# Patient Record
Sex: FEMALE | NOT HISPANIC OR LATINO
[De-identification: names, ages, dates, MRNs, and addresses within clinical notes are randomized per-mention and may not be internally consistent; named-entity substitution may affect disease eponyms.]

---

## 2018-02-12 PROBLEM — Z00.00 ENCOUNTER FOR PREVENTIVE HEALTH EXAMINATION: Status: ACTIVE | Noted: 2018-02-12

## 2018-02-13 ENCOUNTER — APPOINTMENT (OUTPATIENT)
Dept: MAMMOGRAPHY | Facility: CLINIC | Age: 53
End: 2018-02-13
Payer: COMMERCIAL

## 2018-02-13 ENCOUNTER — OUTPATIENT (OUTPATIENT)
Dept: OUTPATIENT SERVICES | Facility: HOSPITAL | Age: 53
LOS: 1 days | End: 2018-02-13
Payer: COMMERCIAL

## 2018-02-13 DIAGNOSIS — Z00.00 ENCOUNTER FOR GENERAL ADULT MEDICAL EXAMINATION WITHOUT ABNORMAL FINDINGS: ICD-10-CM

## 2018-02-13 PROCEDURE — 77063 BREAST TOMOSYNTHESIS BI: CPT

## 2018-02-13 PROCEDURE — 77067 SCR MAMMO BI INCL CAD: CPT

## 2018-02-13 PROCEDURE — 77067 SCR MAMMO BI INCL CAD: CPT | Mod: 26

## 2018-02-13 PROCEDURE — 77063 BREAST TOMOSYNTHESIS BI: CPT | Mod: 26

## 2018-03-08 ENCOUNTER — APPOINTMENT (OUTPATIENT)
Dept: ULTRASOUND IMAGING | Facility: CLINIC | Age: 53
End: 2018-03-08
Payer: COMMERCIAL

## 2018-03-08 ENCOUNTER — OUTPATIENT (OUTPATIENT)
Dept: OUTPATIENT SERVICES | Facility: HOSPITAL | Age: 53
LOS: 1 days | End: 2018-03-08
Payer: COMMERCIAL

## 2018-03-08 DIAGNOSIS — R92.8 OTHER ABNORMAL AND INCONCLUSIVE FINDINGS ON DIAGNOSTIC IMAGING OF BREAST: ICD-10-CM

## 2018-03-08 PROCEDURE — 76641 ULTRASOUND BREAST COMPLETE: CPT

## 2018-03-08 PROCEDURE — 76641 ULTRASOUND BREAST COMPLETE: CPT | Mod: 26,LT

## 2018-03-08 PROCEDURE — 76642 ULTRASOUND BREAST LIMITED: CPT | Mod: 26,RT

## 2018-03-08 PROCEDURE — 76642 ULTRASOUND BREAST LIMITED: CPT

## 2021-02-24 ENCOUNTER — OUTPATIENT (OUTPATIENT)
Dept: OUTPATIENT SERVICES | Facility: HOSPITAL | Age: 56
LOS: 1 days | End: 2021-02-24
Payer: COMMERCIAL

## 2021-02-24 ENCOUNTER — APPOINTMENT (OUTPATIENT)
Dept: MAMMOGRAPHY | Facility: CLINIC | Age: 56
End: 2021-02-24
Payer: COMMERCIAL

## 2021-02-24 DIAGNOSIS — Z12.39 ENCOUNTER FOR OTHER SCREENING FOR MALIGNANT NEOPLASM OF BREAST: ICD-10-CM

## 2021-02-24 PROCEDURE — 77067 SCR MAMMO BI INCL CAD: CPT

## 2021-02-24 PROCEDURE — 77063 BREAST TOMOSYNTHESIS BI: CPT | Mod: 26

## 2021-02-24 PROCEDURE — 77063 BREAST TOMOSYNTHESIS BI: CPT

## 2021-02-24 PROCEDURE — 77067 SCR MAMMO BI INCL CAD: CPT | Mod: 26

## 2024-05-02 ENCOUNTER — OFFICE (OUTPATIENT)
Dept: URBAN - METROPOLITAN AREA CLINIC 115 | Facility: CLINIC | Age: 59
Setting detail: OPHTHALMOLOGY
End: 2024-05-02
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
## 2024-05-02 PROCEDURE — NO SHOW FE NO SHOW FEE: Performed by: OPTOMETRIST

## 2024-05-03 ENCOUNTER — OFFICE (OUTPATIENT)
Dept: URBAN - METROPOLITAN AREA CLINIC 115 | Facility: CLINIC | Age: 59
Setting detail: OPHTHALMOLOGY
End: 2024-05-03

## 2024-05-03 DIAGNOSIS — H25.13: ICD-10-CM

## 2024-05-03 DIAGNOSIS — H53.433: ICD-10-CM

## 2024-05-03 DIAGNOSIS — H46.2: ICD-10-CM

## 2024-05-03 PROCEDURE — 92004 COMPRE OPH EXAM NEW PT 1/>: CPT | Performed by: OPHTHALMOLOGY

## 2024-05-03 PROCEDURE — 92133 CPTRZD OPH DX IMG PST SGM ON: CPT | Performed by: OPHTHALMOLOGY

## 2024-05-03 PROCEDURE — 92250 FUNDUS PHOTOGRAPHY W/I&R: CPT | Performed by: OPHTHALMOLOGY

## 2024-05-03 ASSESSMENT — CONFRONTATIONAL VISUAL FIELD TEST (CVF)
OD_FINDINGS: FULL
OS_FINDINGS: FULL

## 2024-10-02 ENCOUNTER — OFFICE (OUTPATIENT)
Dept: URBAN - METROPOLITAN AREA CLINIC 115 | Facility: CLINIC | Age: 59
Setting detail: OPHTHALMOLOGY
End: 2024-10-02
Payer: COMMERCIAL

## 2024-10-02 DIAGNOSIS — H25.13: ICD-10-CM

## 2024-10-02 DIAGNOSIS — H53.433: ICD-10-CM

## 2024-10-02 DIAGNOSIS — H46.2: ICD-10-CM

## 2024-10-02 DIAGNOSIS — D35.2: ICD-10-CM

## 2024-10-02 PROCEDURE — 92133 CPTRZD OPH DX IMG PST SGM ON: CPT | Performed by: OPHTHALMOLOGY

## 2024-10-02 PROCEDURE — 92014 COMPRE OPH EXAM EST PT 1/>: CPT | Performed by: OPHTHALMOLOGY

## 2024-10-02 PROCEDURE — 92083 EXTENDED VISUAL FIELD XM: CPT | Performed by: OPHTHALMOLOGY

## 2024-10-02 ASSESSMENT — CONFRONTATIONAL VISUAL FIELD TEST (CVF)
OS_FINDINGS: FULL
OD_FINDINGS: FULL

## 2024-10-02 ASSESSMENT — TONOMETRY
OD_IOP_MMHG: 17
OS_IOP_MMHG: 18

## 2024-10-16 ASSESSMENT — REFRACTION_AUTOREFRACTION
OS_SPHERE: +2.75
OD_CYLINDER: -1.50
OD_SPHERE: +2.50
OS_AXIS: 080
OS_CYLINDER: -1.75
OD_AXIS: 093

## 2024-10-16 ASSESSMENT — REFRACTION_CURRENTRX
OD_AXIS: 85
OS_VPRISM_DIRECTION: SV
OD_SPHERE: -+2.50
OD_OVR_VA: 20/
OS_OVR_VA: 20/
OS_SPHERE: +2.50
OD_VPRISM_DIRECTION: SV
OS_AXIS: 73
OS_CYLINDER: -0.25
OD_CYLINDER: -0.25

## 2024-10-16 ASSESSMENT — VISUAL ACUITY
OS_BCVA: 20/25-
OD_BCVA: 20/150

## 2024-10-18 ENCOUNTER — INPATIENT (INPATIENT)
Facility: HOSPITAL | Age: 59
LOS: 9 days | Discharge: HOME | DRG: 645 | End: 2024-10-28
Attending: NEUROLOGICAL SURGERY | Admitting: NEUROLOGICAL SURGERY
Payer: COMMERCIAL

## 2024-10-18 VITALS
RESPIRATION RATE: 18 BRPM | HEART RATE: 97 BPM | OXYGEN SATURATION: 97 % | WEIGHT: 149.91 LBS | SYSTOLIC BLOOD PRESSURE: 127 MMHG | TEMPERATURE: 99 F | DIASTOLIC BLOOD PRESSURE: 87 MMHG | HEIGHT: 65 IN

## 2024-10-18 DIAGNOSIS — D49.7 NEOPLASM OF UNSPECIFIED BEHAVIOR OF ENDOCRINE GLANDS AND OTHER PARTS OF NERVOUS SYSTEM: ICD-10-CM

## 2024-10-18 DIAGNOSIS — Z87.898 PERSONAL HISTORY OF OTHER SPECIFIED CONDITIONS: Chronic | ICD-10-CM

## 2024-10-18 DIAGNOSIS — Z98.891 HISTORY OF UTERINE SCAR FROM PREVIOUS SURGERY: Chronic | ICD-10-CM

## 2024-10-18 LAB
ALBUMIN SERPL ELPH-MCNC: 4.4 G/DL — SIGNIFICANT CHANGE UP (ref 3.3–5.2)
ALP SERPL-CCNC: 94 U/L — SIGNIFICANT CHANGE UP (ref 40–120)
ALT FLD-CCNC: 24 U/L — SIGNIFICANT CHANGE UP
ANION GAP SERPL CALC-SCNC: 12 MMOL/L — SIGNIFICANT CHANGE UP (ref 5–17)
APTT BLD: 29.9 SEC — SIGNIFICANT CHANGE UP (ref 24.5–35.6)
AST SERPL-CCNC: 29 U/L — SIGNIFICANT CHANGE UP
BASOPHILS # BLD AUTO: 0.05 K/UL — SIGNIFICANT CHANGE UP (ref 0–0.2)
BASOPHILS NFR BLD AUTO: 0.5 % — SIGNIFICANT CHANGE UP (ref 0–2)
BILIRUB SERPL-MCNC: 0.2 MG/DL — LOW (ref 0.4–2)
BLD GP AB SCN SERPL QL: SIGNIFICANT CHANGE UP
BUN SERPL-MCNC: 11.2 MG/DL — SIGNIFICANT CHANGE UP (ref 8–20)
CALCIUM SERPL-MCNC: 10.2 MG/DL — SIGNIFICANT CHANGE UP (ref 8.4–10.5)
CHLORIDE SERPL-SCNC: 103 MMOL/L — SIGNIFICANT CHANGE UP (ref 96–108)
CO2 SERPL-SCNC: 23 MMOL/L — SIGNIFICANT CHANGE UP (ref 22–29)
CREAT SERPL-MCNC: 0.88 MG/DL — SIGNIFICANT CHANGE UP (ref 0.5–1.3)
EGFR: 75 ML/MIN/1.73M2 — SIGNIFICANT CHANGE UP
EOSINOPHIL # BLD AUTO: 0.07 K/UL — SIGNIFICANT CHANGE UP (ref 0–0.5)
EOSINOPHIL NFR BLD AUTO: 0.6 % — SIGNIFICANT CHANGE UP (ref 0–6)
GLUCOSE SERPL-MCNC: 69 MG/DL — LOW (ref 70–99)
HCT VFR BLD CALC: 45.5 % — HIGH (ref 34.5–45)
HGB BLD-MCNC: 15.6 G/DL — HIGH (ref 11.5–15.5)
IMM GRANULOCYTES NFR BLD AUTO: 0.2 % — SIGNIFICANT CHANGE UP (ref 0–0.9)
INR BLD: 1.08 RATIO — SIGNIFICANT CHANGE UP (ref 0.85–1.16)
LYMPHOCYTES # BLD AUTO: 3.28 K/UL — SIGNIFICANT CHANGE UP (ref 1–3.3)
LYMPHOCYTES # BLD AUTO: 30.1 % — SIGNIFICANT CHANGE UP (ref 13–44)
MCHC RBC-ENTMCNC: 31 PG — SIGNIFICANT CHANGE UP (ref 27–34)
MCHC RBC-ENTMCNC: 34.3 GM/DL — SIGNIFICANT CHANGE UP (ref 32–36)
MCV RBC AUTO: 90.5 FL — SIGNIFICANT CHANGE UP (ref 80–100)
MONOCYTES # BLD AUTO: 0.87 K/UL — SIGNIFICANT CHANGE UP (ref 0–0.9)
MONOCYTES NFR BLD AUTO: 8 % — SIGNIFICANT CHANGE UP (ref 2–14)
NEUTROPHILS # BLD AUTO: 6.6 K/UL — SIGNIFICANT CHANGE UP (ref 1.8–7.4)
NEUTROPHILS NFR BLD AUTO: 60.6 % — SIGNIFICANT CHANGE UP (ref 43–77)
PLATELET # BLD AUTO: 284 K/UL — SIGNIFICANT CHANGE UP (ref 150–400)
POTASSIUM SERPL-MCNC: 4.3 MMOL/L — SIGNIFICANT CHANGE UP (ref 3.5–5.3)
POTASSIUM SERPL-SCNC: 4.3 MMOL/L — SIGNIFICANT CHANGE UP (ref 3.5–5.3)
PROT SERPL-MCNC: 8.3 G/DL — SIGNIFICANT CHANGE UP (ref 6.6–8.7)
PROTHROM AB SERPL-ACNC: 12.2 SEC — SIGNIFICANT CHANGE UP (ref 9.9–13.4)
RBC # BLD: 5.03 M/UL — SIGNIFICANT CHANGE UP (ref 3.8–5.2)
RBC # FLD: 13.4 % — SIGNIFICANT CHANGE UP (ref 10.3–14.5)
SODIUM SERPL-SCNC: 138 MMOL/L — SIGNIFICANT CHANGE UP (ref 135–145)
WBC # BLD: 10.89 K/UL — HIGH (ref 3.8–10.5)
WBC # FLD AUTO: 10.89 K/UL — HIGH (ref 3.8–10.5)

## 2024-10-18 PROCEDURE — 99222 1ST HOSP IP/OBS MODERATE 55: CPT

## 2024-10-18 PROCEDURE — 70553 MRI BRAIN STEM W/O & W/DYE: CPT | Mod: 26,76

## 2024-10-18 PROCEDURE — 99285 EMERGENCY DEPT VISIT HI MDM: CPT

## 2024-10-18 PROCEDURE — 71045 X-RAY EXAM CHEST 1 VIEW: CPT | Mod: 26

## 2024-10-18 RX ORDER — B-COMPLEX WITH VITAMIN C
1 VIAL (ML) INJECTION DAILY
Refills: 0 | Status: DISCONTINUED | OUTPATIENT
Start: 2024-10-18 | End: 2024-10-23

## 2024-10-18 RX ORDER — SODIUM CHLORIDE 9 MG/ML
1000 INJECTION, SOLUTION INTRAMUSCULAR; INTRAVENOUS; SUBCUTANEOUS
Refills: 0 | Status: DISCONTINUED | OUTPATIENT
Start: 2024-10-18 | End: 2024-10-23

## 2024-10-18 RX ORDER — ENOXAPARIN SODIUM 40MG/0.4ML
40 SYRINGE (ML) SUBCUTANEOUS EVERY 24 HOURS
Refills: 0 | Status: DISCONTINUED | OUTPATIENT
Start: 2024-10-19 | End: 2024-10-22

## 2024-10-18 RX ORDER — ONDANSETRON HYDROCHLORIDE 2 MG/ML
4 INJECTION, SOLUTION INTRAMUSCULAR; INTRAVENOUS EVERY 6 HOURS
Refills: 0 | Status: DISCONTINUED | OUTPATIENT
Start: 2024-10-18 | End: 2024-10-23

## 2024-10-18 RX ORDER — SENNA 187 MG
2 TABLET ORAL AT BEDTIME
Refills: 0 | Status: DISCONTINUED | OUTPATIENT
Start: 2024-10-18 | End: 2024-10-23

## 2024-10-18 RX ORDER — POLYETHYLENE GLYCOL 3350 17 G/17G
17 POWDER, FOR SOLUTION ORAL DAILY
Refills: 0 | Status: DISCONTINUED | OUTPATIENT
Start: 2024-10-18 | End: 2024-10-23

## 2024-10-18 RX ORDER — ACETAMINOPHEN 500 MG
650 TABLET ORAL EVERY 6 HOURS
Refills: 0 | Status: DISCONTINUED | OUTPATIENT
Start: 2024-10-18 | End: 2024-10-23

## 2024-10-18 NOTE — ED PROVIDER NOTE - PRINCIPAL DIAGNOSIS
Pituitary tumor Topical Retinoid counseling:  Patient advised to apply a pea-sized amount only at bedtime and wait 30 minutes after washing their face before applying.  If too drying, patient may add a non-comedogenic moisturizer. The patient verbalized understanding of the proper use and possible adverse effects of retinoids.  All of the patient's questions and concerns were addressed.

## 2024-10-18 NOTE — ED PROVIDER NOTE - PHYSICAL EXAMINATION
Gen: Alert, NAD  Head: NC, AT, PERRL, EOMI, normal lids/conjunctiva\  Neck: +supple, no tenderness/meningismus/JVD, +Trachea midline  Pulm: Bilateral BS, normal resp effort, no wheeze/stridor/retractions  CV: RRR, no M/R/G, 2+dist pulses  Abd: soft, NT/ND, +BS, no hepatosplenomegaly  Mskel: ROM intact x4 extremities.  no edema/erythema/cyanosis  Skin: no rash, warm, dry  Neuro: AAOx3, left eye visual field deficit.  otherwise intact

## 2024-10-18 NOTE — ED PROVIDER NOTE - OBJECTIVE STATEMENT
59 yoF; pituitary mass (s/p resection 2019 by Dr. Tovar at [facility]); now presenting with headache, blurry vision and left upper extremity tingling and numbness over the past 6 months.  Patient states progressively worsening blurry vision.  Patient recently had an MRI that showed recurrence of the tumor.  Patient now transferred here from [facility] for evaluation by neurosurgery.

## 2024-10-18 NOTE — ED PROVIDER NOTE - CLINICAL SUMMARY MEDICAL DECISION MAKING FREE TEXT BOX
59 yoF; pituitary mass (s/p resection 2019 by Dr. Tovar at [facility]); now presenting with headache, blurry vision and left upper extremity tingling and numbness over the past 6 months.  Patient states progressively worsening blurry vision.  Patient recently had an MRI that showed recurrence of the tumor.  Patient now transferred here from [facility] for evaluation by neurosurgery.  will admit to neuro stepdown

## 2024-10-18 NOTE — ED ADULT NURSE NOTE - OBJECTIVE STATEMENT
pt received in critical care. Transfer from [facility] for regrowth of "tumor in eye". No facial droop, symmetrical movements, NAD noted, respirations even and unlabored.  Safety precautions in place.  Plan of care explained, pt verbalized understanding.    Jose at bedside.

## 2024-10-18 NOTE — H&P ADULT - ASSESSMENT
60 yo with hx of TSP for pituitary resection by Dr. Guevara in 2019 presents for blurry vision. 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     Plan:  - admit to Dr. Guevara/Neuro surgery   - step down neuro checks q2  - MRI brain w&w/out  and sella with brain lab  - SDCs and lovenox for DVT ppx  - reg diet  - IVF   - pain control multimodal, prn, avoid over sedation  - bowel regimen senna miralax  - Endo consult  - endo labs   - Medicine to be consulted  - doppler, chest xray. EKG, TTE    - case and plan discussed with Dr. Guevara

## 2024-10-18 NOTE — ED ADULT NURSE NOTE - ED STAT RN HANDOFF DETAILS
Report given to Sam FRITZ RN in HR. Pt placed on telebox and transported by RN. NAD noted, respirations even and unlabored.

## 2024-10-18 NOTE — H&P ADULT - BIRTH SEX
Patient is calling the office to get a refill on in syringes through [employer]    1 CC, 8 mm, 31 gauge syringes.  90 day supply    Please call into [employer] at: [phone]       Female

## 2024-10-18 NOTE — ED ADULT TRIAGE NOTE - CHIEF COMPLAINT QUOTE
pt BIBA from [facility] with hx of pituitary tumor 5 years ago, was resected and now the past few days has had changes in vision, CT @ [facility] showed recurrence of pituitary mass. pt a&ox4, c/o of blurry vision. pt BIBA from [facility] with hx of pituitary tumor 5 years ago, was resected and now the past few days has had changes in vision, HA and L upper arm numbness and tingling. CT @ [facility] showed recurrence of pituitary mass. pt a&ox4, c/o of blurry vision.

## 2024-10-18 NOTE — ED ADULT NURSE NOTE - CHIEF COMPLAINT QUOTE
pt BIBA from [facility] with hx of pituitary tumor 5 years ago, was resected and now the past few days has had changes in vision, CT @ [facility] showed recurrence of pituitary mass. pt a&ox4, c/o of blurry vision.

## 2024-10-18 NOTE — H&P ADULT - NSHPPHYSICALEXAM_GEN_ALL_CORE
CONSTITUTIONAL: Well groomed, no apparent distress   EYES: PERRLA and symmetric, EOMI, No conjunctival or scleral injection, non-icteric    reports blurry vision in Left eye. Questionable Left superior temporal vision loss.   RESP: No respiratory distress   NEURO:   - Mental Status:  Alert, awake, oriented to person, place, and time; Speech is fluent. Language is normal. Follows commands well.  Insight and knowledge appear appropriate.    - Motor:  Strength is 5/5 in b/l UEs and LEs.  There is no pronator drift.    - Sensory:  Symmetric to light touch    PSYCH: Appropriate insight/judgment; A+O x 3, mood and affect appropriate

## 2024-10-18 NOTE — H&P ADULT - HISTORY OF PRESENT ILLNESS
60 yo F with hx of TSP for pituitary resection by Dr. Guevara in 2019 presents for blurry vision. Pt has been having blurry vision for a few weeks. [facility] went to [facility] for eye exam due to blurry vision. They sent her for MRI which was preformed at a [facility] which showed increased size of pituitary macroadenoma with mass effect on optic chiasm. Pt did not have Dr. Guevara's info so presented to [facility]. Pt transferred to [facility].   Pt denies HA, SOB, palpitations.

## 2024-10-18 NOTE — ED PROVIDER NOTE - NS ED ROS FT
Constitutional: (-) fever  (-)chills  (-)sweats  Eyes/ENT: +blurry vision  Cardiovascular: (-) chest pain, (-) palpitations (-) edema   Respiratory: (-) cough, (-) shortness of breath   Gastrointestinal: (-)nausea  (-)vomiting, (-) diarrhea  (-) abdominal pain   :  (-)dysuria, (-)frequency, (-)urgency, (-)hematuria  Musculoskeletal: (-) neck pain, (-) back pain, (-) joint pain  Integumentary: (-) rash, (-) edema  Neurological: (+) headache, (-) altered mental status  (-)LOC +weakness

## 2024-10-19 ENCOUNTER — RESULT REVIEW (OUTPATIENT)
Age: 59
End: 2024-10-19

## 2024-10-19 LAB
A1C WITH ESTIMATED AVERAGE GLUCOSE RESULT: 5.7 % — HIGH (ref 4–5.6)
ACTH SER-ACNC: 13.9 PG/ML — SIGNIFICANT CHANGE UP (ref 7.2–63.3)
ALBUMIN SERPL ELPH-MCNC: 3.8 G/DL — SIGNIFICANT CHANGE UP (ref 3.3–5.2)
ALP SERPL-CCNC: 83 U/L — SIGNIFICANT CHANGE UP (ref 40–120)
ALT FLD-CCNC: 20 U/L — SIGNIFICANT CHANGE UP
ANION GAP SERPL CALC-SCNC: 14 MMOL/L — SIGNIFICANT CHANGE UP (ref 5–17)
AST SERPL-CCNC: 23 U/L — SIGNIFICANT CHANGE UP
BILIRUB SERPL-MCNC: 0.3 MG/DL — LOW (ref 0.4–2)
BUN SERPL-MCNC: 11.6 MG/DL — SIGNIFICANT CHANGE UP (ref 8–20)
CALCIUM SERPL-MCNC: 9.1 MG/DL — SIGNIFICANT CHANGE UP (ref 8.4–10.5)
CHLORIDE SERPL-SCNC: 104 MMOL/L — SIGNIFICANT CHANGE UP (ref 96–108)
CO2 SERPL-SCNC: 23 MMOL/L — SIGNIFICANT CHANGE UP (ref 22–29)
CORTIS AM PEAK SERPL-MCNC: 15.6 UG/DL — SIGNIFICANT CHANGE UP (ref 6–18.4)
CREAT SERPL-MCNC: 0.76 MG/DL — SIGNIFICANT CHANGE UP (ref 0.5–1.3)
EGFR: 89 ML/MIN/1.73M2 — SIGNIFICANT CHANGE UP
ESTIMATED AVERAGE GLUCOSE: 117 MG/DL — HIGH (ref 68–114)
FSH SERPL-MCNC: 5.7 IU/L — SIGNIFICANT CHANGE UP
GH SERPL-MCNC: 1.18 NG/ML — SIGNIFICANT CHANGE UP (ref 0.12–9.88)
GLUCOSE BLDC GLUCOMTR-MCNC: 124 MG/DL — HIGH (ref 70–99)
GLUCOSE BLDC GLUCOMTR-MCNC: 141 MG/DL — HIGH (ref 70–99)
GLUCOSE SERPL-MCNC: 81 MG/DL — SIGNIFICANT CHANGE UP (ref 70–99)
HCT VFR BLD CALC: 42.1 % — SIGNIFICANT CHANGE UP (ref 34.5–45)
HGB BLD-MCNC: 14 G/DL — SIGNIFICANT CHANGE UP (ref 11.5–15.5)
LH SERPL-ACNC: 0.4 IU/L — SIGNIFICANT CHANGE UP
MAGNESIUM SERPL-MCNC: 2.1 MG/DL — SIGNIFICANT CHANGE UP (ref 1.6–2.6)
MCHC RBC-ENTMCNC: 30.2 PG — SIGNIFICANT CHANGE UP (ref 27–34)
MCHC RBC-ENTMCNC: 33.3 GM/DL — SIGNIFICANT CHANGE UP (ref 32–36)
MCV RBC AUTO: 90.7 FL — SIGNIFICANT CHANGE UP (ref 80–100)
PHOSPHATE SERPL-MCNC: 3.9 MG/DL — SIGNIFICANT CHANGE UP (ref 2.4–4.7)
PLATELET # BLD AUTO: 258 K/UL — SIGNIFICANT CHANGE UP (ref 150–400)
POTASSIUM SERPL-MCNC: 4.1 MMOL/L — SIGNIFICANT CHANGE UP (ref 3.5–5.3)
POTASSIUM SERPL-SCNC: 4.1 MMOL/L — SIGNIFICANT CHANGE UP (ref 3.5–5.3)
PROLACTIN SERPL-MCNC: 91 NG/ML — HIGH (ref 3.4–24.1)
PROT SERPL-MCNC: 7 G/DL — SIGNIFICANT CHANGE UP (ref 6.6–8.7)
RBC # BLD: 4.64 M/UL — SIGNIFICANT CHANGE UP (ref 3.8–5.2)
RBC # FLD: 13.4 % — SIGNIFICANT CHANGE UP (ref 10.3–14.5)
SODIUM SERPL-SCNC: 141 MMOL/L — SIGNIFICANT CHANGE UP (ref 135–145)
T3 SERPL-MCNC: 98 NG/DL — SIGNIFICANT CHANGE UP (ref 80–200)
T4 AB SER-ACNC: 6.9 UG/DL — SIGNIFICANT CHANGE UP (ref 4.5–12)
TSH SERPL-MCNC: 2.19 UIU/ML — SIGNIFICANT CHANGE UP (ref 0.27–4.2)
WBC # BLD: 10.23 K/UL — SIGNIFICANT CHANGE UP (ref 3.8–10.5)
WBC # FLD AUTO: 10.23 K/UL — SIGNIFICANT CHANGE UP (ref 3.8–10.5)

## 2024-10-19 PROCEDURE — 93306 TTE W/DOPPLER COMPLETE: CPT | Mod: 26

## 2024-10-19 PROCEDURE — 99231 SBSQ HOSP IP/OBS SF/LOW 25: CPT

## 2024-10-19 PROCEDURE — 99223 1ST HOSP IP/OBS HIGH 75: CPT

## 2024-10-19 PROCEDURE — 93010 ELECTROCARDIOGRAM REPORT: CPT

## 2024-10-19 RX ORDER — DEXAMETHASONE 1.5 MG 1.5 MG/1
4 TABLET ORAL EVERY 6 HOURS
Refills: 0 | Status: DISCONTINUED | OUTPATIENT
Start: 2024-10-19 | End: 2024-10-23

## 2024-10-19 RX ORDER — PANTOPRAZOLE SODIUM 40 MG/1
40 TABLET, DELAYED RELEASE ORAL DAILY
Refills: 0 | Status: DISCONTINUED | OUTPATIENT
Start: 2024-10-19 | End: 2024-10-23

## 2024-10-19 RX ORDER — INSULIN LISPRO 100/ML
VIAL (ML) SUBCUTANEOUS
Refills: 0 | Status: DISCONTINUED | OUTPATIENT
Start: 2024-10-19 | End: 2024-10-23

## 2024-10-19 RX ORDER — GLUCAGON INJECTION, SOLUTION 1 MG/.2ML
1 INJECTION, SOLUTION SUBCUTANEOUS ONCE
Refills: 0 | Status: DISCONTINUED | OUTPATIENT
Start: 2024-10-19 | End: 2024-10-23

## 2024-10-19 RX ORDER — INSULIN LISPRO 100/ML
VIAL (ML) SUBCUTANEOUS AT BEDTIME
Refills: 0 | Status: DISCONTINUED | OUTPATIENT
Start: 2024-10-19 | End: 2024-10-23

## 2024-10-19 RX ADMIN — POLYETHYLENE GLYCOL 3350 17 GRAM(S): 17 POWDER, FOR SOLUTION ORAL at 11:55

## 2024-10-19 RX ADMIN — Medication 2 TABLET(S): at 21:26

## 2024-10-19 RX ADMIN — DEXAMETHASONE 1.5 MG 4 MILLIGRAM(S): 1.5 TABLET ORAL at 17:07

## 2024-10-19 RX ADMIN — Medication 40 MILLIGRAM(S): at 11:54

## 2024-10-19 RX ADMIN — Medication 1 TABLET(S): at 11:54

## 2024-10-19 RX ADMIN — SODIUM CHLORIDE 60 MILLILITER(S): 9 INJECTION, SOLUTION INTRAMUSCULAR; INTRAVENOUS; SUBCUTANEOUS at 11:56

## 2024-10-19 NOTE — CONSULT NOTE ADULT - SUBJECTIVE AND OBJECTIVE BOX
60 yo F with hx of TSP for pituitary resection by Dr. Guevara in 2019 presents for blurry vision, she has been having blurry vision for a few weeks. Pt went to [facility] for eye exam due to blurry vision. They sent her for MRI which was preformed at a [facility] which showed increased size of pituitary macroadenoma with mass effect on optic chiasm., came in for admitted under Neurosurgery for further Management, imaging done here showed  Large mass in the sella and suprasellar cistern, with chiasmatic encroachment, over the course she has been complaining of intermittent chest pain, she has this chest pain for 3 weeks, no chest tenderness, no relation with exertion, EKG with no acute changes, trop 1st set done came negative too, TTE pending, medicine consulted for Medical Management.        Allergies:   	No Known Allergies:     Home Medications:   * Outpatient Medication Status not yet specified      PAST MEDICAL HISTORY:  History of pituitary surgery.     PAST SURGICAL HISTORY:  History of pituitary tumor     S/P  section.     Social History:  Not a smoker, drinker or using any drugs     Vital Signs Last 24 Hrs  T(C): 36.7 (19 Oct 2024 15:00), Max: 37.2 (18 Oct 2024 17:19)  T(F): 98 (19 Oct 2024 15:00), Max: 98.9 (18 Oct 2024 17:19)  HR: 76 (19 Oct 2024 15:00) (51 - 97)  BP: 126/81 (19 Oct 2024 15:00) (107/72 - 138/68)  BP(mean): 10 (18 Oct 2024 20:14) (10 - 10)  RR: 16 (19 Oct 2024 15:00) (16 - 18)  SpO2: 98% (19 Oct 2024 15:00) (97% - 100%)    Parameters below as of 19 Oct 2024 15:00  Patient On (Oxygen Delivery Method): room air        PHYSICAL EXAM:    GENERAL: Middle age female looking comfortable  NECK: soft, Supple, No JVD  CHEST/LUNG: Clear to auscultate bilaterally; No wheezing  HEART: S1S2+, Regular rate and rhythm; No murmurs  ABDOMEN: Soft, Nontender, Nondistended; Bowel sounds present  EXTREMITIES:  1+ Peripheral Pulses, No edema  SKIN: No rashes or lesions  NEURO: AAOX3  PSYCH: normal mood      LABS:                        14.0   10.23 )-----------( 258      ( 19 Oct 2024 04:15 )             42.1     10    141  |  104  |  11.6  ----------------------------<  81  4.1   |  23.0  |  0.76    Ca    9.1      19 Oct 2024 04:15  Phos  3.9     10-19  Mg     2.1     10-19    TPro  7.0  /  Alb  3.8  /  TBili  0.3[L]  /  DBili  x   /  AST  23  /  ALT  20  /  AlkPhos  83  10    PT/INR - ( 18 Oct 2024 17:47 )   PT: 12.2 sec;   INR: 1.08 ratio         PTT - ( 18 Oct 2024 17:47 )  PTT:29.9 sec      I&O's Summary    18 Oct 2024 07:01  -  19 Oct 2024 07:00  --------------------------------------------------------  IN: 480 mL / OUT: 0 mL / NET: 480 mL        MEDICATIONS  (STANDING):  dexAMETHasone  Injectable 4 milliGRAM(s) IV Push every 6 hours  dextrose 5%. 1000 milliLiter(s) (100 mL/Hr) IV Continuous <Continuous>  dextrose 5%. 1000 milliLiter(s) (50 mL/Hr) IV Continuous <Continuous>  dextrose 50% Injectable 25 Gram(s) IV Push once  dextrose 50% Injectable 12.5 Gram(s) IV Push once  dextrose 50% Injectable 25 Gram(s) IV Push once  enoxaparin Injectable 40 milliGRAM(s) SubCutaneous every 24 hours  glucagon  Injectable 1 milliGRAM(s) IntraMuscular once  insulin lispro (ADMELOG) corrective regimen sliding scale   SubCutaneous three times a day before meals  insulin lispro (ADMELOG) corrective regimen sliding scale   SubCutaneous at bedtime  multivitamin 1 Tablet(s) Oral daily  pantoprazole  Injectable 40 milliGRAM(s) IV Push daily  polyethylene glycol 3350 17 Gram(s) Oral daily  senna 2 Tablet(s) Oral at bedtime  sodium chloride 0.9%. 1000 milliLiter(s) (60 mL/Hr) IV Continuous <Continuous>    MEDICATIONS  (PRN):  acetaminophen     Tablet .. 650 milliGRAM(s) Oral every 6 hours PRN Mild Pain (1 - 3)  dextrose Oral Gel 15 Gram(s) Oral once PRN Blood Glucose LESS THAN 70 milliGRAM(s)/deciliter  ondansetron Injectable 4 milliGRAM(s) IV Push every 6 hours PRN Nausea and/or Vomiting

## 2024-10-19 NOTE — CONSULT NOTE ADULT - ASSESSMENT
58 yo F with hx of TSP for pituitary resection by Dr. Guevara in 2019 presents for blurry vision, she has been having blurry vision for a few weeks. Pt went to [facility] for eye exam due to blurry vision. They sent her for MRI which was preformed at a [facility] which showed increased size of pituitary macroadenoma with mass effect on optic chiasm., came in for admitted under Neurosurgery for further Management, imaging done here showed  Large mass in the sella and suprasellar cistern, with chiasmatic encroachment, over the course she has been complaining of intermittent chest pain, she has this chest pain for 3 weeks, no chest tenderness, no relation with exertion, EKG with no acute changes, trop 1st set done came negative too, TTE pending, medicine consulted for Medical Management.     Blurry Vission/  Large mass in the sella and suprasellar cistern, with chiasmatic encroachment:   Management as per Neurosurgery   Neuro checks  Plan for surgery during this admission  Hormone levels to monitor   Endocrine consult.   monitor prolactin level as it is elevated.       Intermittent chest pain:   EKG with no acute changes  trops negative  TTE pending   would recommend cardiology consult as patient might require stress test before procedure.  will continue to monitor    Prediabetes: Counselled for low carb diet.     Plan of care discussed with Neurosurgery team.  60 yo F with hx of TSP for pituitary resection by Dr. Guevara in 2019 presents for blurry vision, she has been having blurry vision for a few weeks. Pt went to [facility] for eye exam due to blurry vision. They sent her for MRI which was preformed at a [facility] which showed increased size of pituitary macroadenoma with mass effect on optic chiasm., came in for admitted under Neurosurgery for further Management, imaging done here showed  Large mass in the sella and suprasellar cistern, with chiasmatic encroachment, over the course she has been complaining of intermittent chest pain, she has this chest pain for 3 weeks, no chest tenderness, no relation with exertion, EKG with no acute changes, trop 1st set done came negative too, TTE pending, medicine consulted for Medical Management.     Blurry Vission/  Large mass in the sella and suprasellar cistern, with chiasmatic encroachment:   Management as per Neurosurgery   Neuro checks  Plan for surgery during this admission  Hormone levels to monitor   Endocrine consult.   monitor prolactin level as it is elevated.       Intermittent chest pain:   EKG with no acute changes  trops negative  TTE pending   would recommend cardiology consult as patient might require stress test before procedure.  will continue to monitor  obtain lipid panel in am   Trop in am  PRN pain meds   could not give aspirin given upcoming surgery    Prediabetes: Counselled for low carb diet.     Plan of care discussed with Neurosurgery team.  60 yo F with hx of TSP for pituitary resection by Dr. Guevara in 2019 presents for blurry vision, she has been having blurry vision for a few weeks. Pt went to [facility] for eye exam due to blurry vision. They sent her for MRI which was preformed at a [facility] which showed increased size of pituitary macroadenoma with mass effect on optic chiasm., came in for admitted under Neurosurgery for further Management, imaging done here showed  Large mass in the sella and suprasellar cistern, with chiasmatic encroachment, over the course she has been complaining of intermittent chest pain, she has this chest pain for 3 weeks, no chest tenderness, no relation with exertion, EKG with no acute changes, trop 1st set done came negative too, TTE pending, medicine consulted for Medical Management.     Plan:     Blurry Vission/  Large mass in the sella and suprasellar cistern, with chiasmatic encroachment:   Management as per Neurosurgery   Neuro checks  Plan for surgery during this admission  Hormone levels to monitor   Endocrine consult.   monitor prolactin level as it is elevated.       Intermittent chest pain:   EKG with no acute changes  trops negative  TTE pending   would recommend cardiology consult as patient might require stress test before procedure.  will continue to monitor  obtain lipid panel in am   Trop in am  PRN pain meds   could not give aspirin given upcoming surgery    Prediabetes: Counselled for low carb diet.     Plan of care discussed with Neurosurgery team.

## 2024-10-19 NOTE — PROGRESS NOTE ADULT - SUBJECTIVE AND OBJECTIVE BOX
NEUROSUGERY PA PROGRESS NOTE  HPI:  58 yo F with hx of TSP for pituitary resection by Dr. Guevara in 2019 presents for blurry vision. Pt has been having blurry vision for a few weeks. Pt went to [facility] for eye exam due to blurry vision. 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 Pt did not have Dr. Guevara's info so presented to [facility]. Pt transferred to [facility].   Pt denies HA, SOB, palpitations.  (18 Oct 2024 19:16)    INTERVAL HPI/OVERNIGHT EVENTS:  59y Female s/p TSP for tumor resection in 2019 presented yesterday with blurry vision and out pt MRI with recurrent pituitary mass.          Vital Signs Last 24 Hrs  T(C): 36.4 (19 Oct 2024 07:09), Max: 37.2 (18 Oct 2024 17:19)  T(F): 97.5 (19 Oct 2024 07:09), Max: 98.9 (18 Oct 2024 17:19)  HR: 65 (19 Oct 2024 07:09) (51 - 97)  BP: 111/69 (19 Oct 2024 07:09) (107/72 - 138/68)  BP(mean): 10 (18 Oct 2024 20:14) (10 - 10)  RR: 18 (19 Oct 2024 07:09) (16 - 18)  SpO2: 97% (19 Oct 2024 07:09) (97% - 99%)  Parameters below as of 19 Oct 2024 07:09  Patient On (Oxygen Delivery Method): room air    PHYSICAL EXAM:      LABS:                        14.0   10.23 )-----------( 258      ( 19 Oct 2024 04:15 )             42.1     10-    141  |  104  |  11.6  ----------------------------<  81  4.1   |  23.0  |  0.76    Ca    9.1      19 Oct 2024 04:15  Phos  3.9     10-  Mg     2.1     10-    TPro  7.0  /  Alb  3.8  /  TBili  0.3[L]  /  DBili  x   /  AST  23  /  ALT  20  /  AlkPhos  83  10-  PT/INR - ( 18 Oct 2024 17:47 )   PT: 12.2 sec;   INR: 1.08 ratio    PTT - ( 18 Oct 2024 17:47 )  PTT:29.9 sec  Urinalysis Basic - ( 19 Oct 2024 04:15 )  Color: x / Appearance: x / SG: x / pH: x  Gluc: 81 mg/dL / Ketone: x  / Bili: x / Urobili: x   Blood: x / Protein: x / Nitrite: x   Leuk Esterase: x / RBC: x / WBC x   Sq Epi: x / Non Sq Epi: x / Bacteria: x  10-18 @ 07:01  -  10-19 @ 07:00  --------------------------------------------------------  IN: 480 mL / OUT: 0 mL / NET: 480 mL    RADIOLOGY & ADDITIONAL TESTS:  < from: MR Brain Stereotactic w/wo IV Cont (10.18.24 @ 23:33) >  INTERPRETATION:  EXAMINATION: MR BRAIN FOR SRS WITHOUT ANDWITH IV   CONTRAST, MR SELLA WITHOUT AND WITH IV CONTRAST  CLINICAL INDICATION: hx of pituitary adenoma w/ increase in growth  TECHNIQUE: Multiplanar MRI images of the brain and the sella were   obtained before and after IV injection of gadolinium,7 cc administered.  COMPARISON: None available.  FINDINGS:  There is a large mass in the sella and suprasellar cistern measuring   approximately 2.2 x 3.0 x 3.0 cm. There is associated chiasmatic   compression. There is invasion of the left cavernous sinus, with tumor   encasing the C4 segment of the left ICA. There is associated mass effect   on the frontal lobes, which are displaced anteriorly and lateral.  The appearance is consistent with the stated clinical diagnosis of a   pituitary adenoma. Comparison with prior outside examinations recommended   to assess for interval growth. Neurosurgical consultation is recommended.  The suprasellar cistern, optic chiasm, carotids, and cavernous sinuses   are otherwise negative. The hypothalamus and clivus are within normal   limits.  Cerebral volume is within normal limits. No premature white matter   disease.  No acute intracranial hemorrhage. No midline shift or herniation. No   acute ischemia. Intracranial flow voids are patent. The cerebellar   tonsils are normally positioned. The dural venous sinuses are patent,   although this examination was not optimized to evaluate the vasculature.  Limited views of the sinuses and mastoids show mild mucosal thickening   without air-fluid levels, likely chronic.  Limited views of the orbits and visualized soft tissues of the neck,   face, scalp, skull base, and calvarium are otherwise unremarkable.  IMPRESSION:  1.  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  Patient Name: FERNANDO DOMINGUEZ  MRN: [id number], : 61  --- End of Report ---  < end of copied text >   NEUROSUGERY PA PROGRESS NOTE  HPI:  60 yo F with hx of TSP for pituitary resection by Dr. Guevara in 2019 presents for blurry vision. Pt has been having blurry vision for a few weeks. Pt went to [facility] for eye exam due to blurry vision. They sent her for MRI which was preformed at a [facility] which showed increased size of pituitary macroadenoma with mass effect on optic chiasm. Pt did not have Dr. Guevara's info so presented to [facility]. Pt transferred to [facility].   Pt denies HA, SOB, palpitations.  (18 Oct 2024 19:16)    INTERVAL HPI/OVERNIGHT EVENTS:  59y Female s/p TSP for tumor resection in 2019 presented yesterday with blurry vision and out pt MRI with recurrent pituitary mass.   No HA, N/V. Blurry vision in left eye stable. Tolerating diet.    Vital Signs Last 24 Hrs  T(C): 36.4 (19 Oct 2024 07:09), Max: 37.2 (18 Oct 2024 17:19)  T(F): 97.5 (19 Oct 2024 07:09), Max: 98.9 (18 Oct 2024 17:19)  HR: 65 (19 Oct 2024 07:09) (51 - 97)  BP: 111/69 (19 Oct 2024 07:09) (107/72 - 138/68)  BP(mean): 10 (18 Oct 2024 20:14) (10 - 10)  RR: 18 (19 Oct 2024 07:09) (16 - 18)  SpO2: 97% (19 Oct 2024 07:09) (97% - 99%)  Parameters below as of 19 Oct 2024 07:09  Patient On (Oxygen Delivery Method): room air    PHYSICAL EXAM:  CONSTITUTIONAL: Well groomed, no apparent distress   EYES: PERRLA and symmetric, EOMI, No conjunctival or scleral injection, non-icteric    reports blurry vision in Left eye. Questionable Left superior temporal vision loss.   RESP: No respiratory distress   NEURO:   - Mental Status:  Alert, awake, oriented to person, place, and time; Speech is fluent. Language is normal. Follows commands well.  Insight and knowledge appear appropriate.    - Motor:  Strength is 5/5 in b/l UEs and LEs.  There is no pronator drift.    - Sensory:  Symmetric to light touch    PSYCH: Appropriate insight/judgment; A+O x 3, mood and affect appropriate    LABS:                        14.0   10 )-----------( 258      ( 19 Oct 2024 04:15 )             42.1     10-19    141  |  104  |  11.6  ----------------------------<  81  4.1   |  23.0  |  0.76    Ca    9.1      19 Oct 2024 04:15  Phos  3.9     10-19  Mg     2.1     10-19    TPro  7.0  /  Alb  3.8  /  TBili  0.3[L]  /  DBili  x   /  AST  23  /  ALT  20  /  AlkPhos  83  10-19  PT/INR - ( 18 Oct 2024 17:47 )   PT: 12.2 sec;   INR: 1.08 ratio    PTT - ( 18 Oct 2024 17:47 )  PTT:29.9 sec  Urinalysis Basic - ( 19 Oct 2024 04:15 )  Color: x / Appearance: x / SG: x / pH: x  Gluc: 81 mg/dL / Ketone: x  / Bili: x / Urobili: x   Blood: x / Protein: x / Nitrite: x   Leuk Esterase: x / RBC: x / WBC x   Sq Epi: x / Non Sq Epi: x / Bacteria: x  10-18 @ 07:01  -  10-19 @ 07:00  --------------------------------------------------------  IN: 480 mL / OUT: 0 mL / NET: 480 mL    RADIOLOGY & ADDITIONAL TESTS:  < from: MR Brain Stereotactic w/wo IV Cont (10.18.24 @ 23:33) >  INTERPRETATION:  EXAMINATION: MR BRAIN FOR SRS WITHOUT ANDWITH IV   CONTRAST, MR SELLA WITHOUT AND WITH IV CONTRAST  CLINICAL INDICATION: hx of pituitary adenoma w/ increase in growth  TECHNIQUE: Multiplanar MRI images of the brain and the sella were   obtained before and after IV injection of gadolinium,7 cc administered.  COMPARISON: None available.  FINDINGS:  There is a large mass in the sella and suprasellar cistern measuring   approximately 2.2 x 3.0 x 3.0 cm. There is associated chiasmatic   compression. There is invasion of the left cavernous sinus, with tumor   encasing the C4 segment of the left ICA. There is associated mass effect   on the frontal lobes, which are displaced anteriorly and lateral.  The appearance is consistent with the stated clinical diagnosis of a   pituitary adenoma. Comparison with prior outside examinations recommended   to assess for interval growth. Neurosurgical consultation is recommended.  The suprasellar cistern, optic chiasm, carotids, and cavernous sinuses   are otherwise negative. The hypothalamus and clivus are within normal   limits.  Cerebral volume is within normal limits. No premature white matter   disease.  No acute intracranial hemorrhage. No midline shift or herniation. No   acute ischemia. Intracranial flow voids are patent. The cerebellar   tonsils are normally positioned. The dural venous sinuses are patent,   although this examination was not optimized to evaluate the vasculature.  Limited views of the sinuses and mastoids show mild mucosal thickening   without air-fluid levels, likely chronic.  Limited views of the orbits and visualized soft tissues of the neck,   face, scalp, skull base, and calvarium are otherwise unremarkable.  IMPRESSION:  1.  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  Patient Name: FERNANDO DOMINGUEZ  MRN: [id number], : 61  --- End of Report ---  < end of copied text >

## 2024-10-19 NOTE — PROGRESS NOTE ADULT - ASSESSMENT
60 yo with hx of TSP for pituitary resection by Dr. Guevara in 2019 presents for blurry vision. 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     Plan:  - admit to Dr. Guevara/Neuro surgery   - step down neuro checks q2  - MRI brain w&w/out  and sella with brain lab  - SDCs and lovenox for DVT ppx  - reg diet  - IVF   - pain control multimodal, prn, avoid over sedation  - bowel regimen senna miralax  - Endo consult  - endo labs   - Medicine to be consulted  - doppler, chest xray. EKG, TTE    - case and plan discussed with Dr. Guevara 60 yo with hx of TSP for pituitary resection by Dr. Guevara in 2019 presents for blurry vision. 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 Guevara/Neuro surgery   - step down neuro checks q2  - MRI brain w&w/out  and sella with brain lab- done reviewed with surgeon  - SDCs and lovenox for DVT ppx  - reg diet  - IVF   - pain control multimodal, prn, avoid over sedation  - bowel regimen senna miralax  - Endo consult- pending  - once endo labs drawn, Decadron 4mg q 6h, PPI, ISS  - Medicine to be consulted  - doppler, chest xray. EKG, TTE    - case and plan discussed with Dr. Guevara, seen with Dr. Guevara on rounds.

## 2024-10-20 DIAGNOSIS — Z01.810 ENCOUNTER FOR PREPROCEDURAL CARDIOVASCULAR EXAMINATION: ICD-10-CM

## 2024-10-20 LAB
ANION GAP SERPL CALC-SCNC: 15 MMOL/L — SIGNIFICANT CHANGE UP (ref 5–17)
BUN SERPL-MCNC: 13.7 MG/DL — SIGNIFICANT CHANGE UP (ref 8–20)
CALCIUM SERPL-MCNC: 9.4 MG/DL — SIGNIFICANT CHANGE UP (ref 8.4–10.5)
CHLORIDE SERPL-SCNC: 103 MMOL/L — SIGNIFICANT CHANGE UP (ref 96–108)
CHOLEST SERPL-MCNC: 221 MG/DL — HIGH
CO2 SERPL-SCNC: 20 MMOL/L — LOW (ref 22–29)
CREAT SERPL-MCNC: 0.76 MG/DL — SIGNIFICANT CHANGE UP (ref 0.5–1.3)
EGFR: 89 ML/MIN/1.73M2 — SIGNIFICANT CHANGE UP
GLUCOSE BLDC GLUCOMTR-MCNC: 118 MG/DL — HIGH (ref 70–99)
GLUCOSE BLDC GLUCOMTR-MCNC: 129 MG/DL — HIGH (ref 70–99)
GLUCOSE BLDC GLUCOMTR-MCNC: 142 MG/DL — HIGH (ref 70–99)
GLUCOSE BLDC GLUCOMTR-MCNC: 142 MG/DL — HIGH (ref 70–99)
GLUCOSE SERPL-MCNC: 126 MG/DL — HIGH (ref 70–99)
HCT VFR BLD CALC: 43.9 % — SIGNIFICANT CHANGE UP (ref 34.5–45)
HDLC SERPL-MCNC: 43 MG/DL — LOW
HGB BLD-MCNC: 14.4 G/DL — SIGNIFICANT CHANGE UP (ref 11.5–15.5)
LIPID PNL WITH DIRECT LDL SERPL: 159 MG/DL — HIGH
MAGNESIUM SERPL-MCNC: 2.5 MG/DL — SIGNIFICANT CHANGE UP (ref 1.6–2.6)
MCHC RBC-ENTMCNC: 29.8 PG — SIGNIFICANT CHANGE UP (ref 27–34)
MCHC RBC-ENTMCNC: 32.8 GM/DL — SIGNIFICANT CHANGE UP (ref 32–36)
MCV RBC AUTO: 90.7 FL — SIGNIFICANT CHANGE UP (ref 80–100)
NON HDL CHOLESTEROL: 178 MG/DL — HIGH
PHOSPHATE SERPL-MCNC: 2.4 MG/DL — SIGNIFICANT CHANGE UP (ref 2.4–4.7)
PLATELET # BLD AUTO: 261 K/UL — SIGNIFICANT CHANGE UP (ref 150–400)
POTASSIUM SERPL-MCNC: 4.8 MMOL/L — SIGNIFICANT CHANGE UP (ref 3.5–5.3)
POTASSIUM SERPL-SCNC: 4.8 MMOL/L — SIGNIFICANT CHANGE UP (ref 3.5–5.3)
RBC # BLD: 4.84 M/UL — SIGNIFICANT CHANGE UP (ref 3.8–5.2)
RBC # FLD: 13.2 % — SIGNIFICANT CHANGE UP (ref 10.3–14.5)
SODIUM SERPL-SCNC: 138 MMOL/L — SIGNIFICANT CHANGE UP (ref 135–145)
TRIGL SERPL-MCNC: 95 MG/DL — SIGNIFICANT CHANGE UP
TROPONIN T, HIGH SENSITIVITY RESULT: <6 NG/L — SIGNIFICANT CHANGE UP (ref 0–51)
WBC # BLD: 10.15 K/UL — SIGNIFICANT CHANGE UP (ref 3.8–10.5)
WBC # FLD AUTO: 10.15 K/UL — SIGNIFICANT CHANGE UP (ref 3.8–10.5)

## 2024-10-20 PROCEDURE — 93970 EXTREMITY STUDY: CPT | Mod: 26

## 2024-10-20 PROCEDURE — 99222 1ST HOSP IP/OBS MODERATE 55: CPT | Mod: 25

## 2024-10-20 PROCEDURE — 99232 SBSQ HOSP IP/OBS MODERATE 35: CPT

## 2024-10-20 PROCEDURE — 99221 1ST HOSP IP/OBS SF/LOW 40: CPT

## 2024-10-20 RX ADMIN — Medication 1 TABLET(S): at 10:41

## 2024-10-20 RX ADMIN — SODIUM CHLORIDE 60 MILLILITER(S): 9 INJECTION, SOLUTION INTRAMUSCULAR; INTRAVENOUS; SUBCUTANEOUS at 00:14

## 2024-10-20 RX ADMIN — Medication 2 TABLET(S): at 22:11

## 2024-10-20 RX ADMIN — Medication 40 MILLIGRAM(S): at 10:42

## 2024-10-20 RX ADMIN — DEXAMETHASONE 1.5 MG 4 MILLIGRAM(S): 1.5 TABLET ORAL at 05:35

## 2024-10-20 RX ADMIN — PANTOPRAZOLE SODIUM 40 MILLIGRAM(S): 40 TABLET, DELAYED RELEASE ORAL at 10:41

## 2024-10-20 RX ADMIN — DEXAMETHASONE 1.5 MG 4 MILLIGRAM(S): 1.5 TABLET ORAL at 17:32

## 2024-10-20 RX ADMIN — DEXAMETHASONE 1.5 MG 4 MILLIGRAM(S): 1.5 TABLET ORAL at 00:13

## 2024-10-20 RX ADMIN — DEXAMETHASONE 1.5 MG 4 MILLIGRAM(S): 1.5 TABLET ORAL at 10:41

## 2024-10-20 RX ADMIN — POLYETHYLENE GLYCOL 3350 17 GRAM(S): 17 POWDER, FOR SOLUTION ORAL at 10:42

## 2024-10-20 NOTE — CONSULT NOTE ADULT - ASSESSMENT
60 yo F with hx of TSP for pituitary resection by Dr. Guevara in 2019 presents for blurry vision. Pt has been having blurry vision for a few weeks. Pt went to [facility] for eye exam due to blurry vision. They sent her for MRI which was preformed at a [facility] which showed increased size of pituitary macroadenoma with mass effect on optic chiasm. Pt did not have Dr. Guevara's info so presented to [facility]. Pt transferred to [facility].   Pt denies HA, SOB, palpitations.

## 2024-10-20 NOTE — PROGRESS NOTE ADULT - SUBJECTIVE AND OBJECTIVE BOX
HPI: 58 yo F with hx of TSP for pituitary resection by Dr. Guevara in 2019 presents for blurry vision. Pt has been having blurry vision for a few weeks. Pt went to [facility] for eye exam due to blurry vision. They sent her for MRI which was preformed at a [facility] which showed increased size of pituitary macroadenoma with mass effect on optic chiasm. Pt did not have Dr. Guevara's info so presented to [facility]. Pt transferred to [facility].  Pt denies HA, SOB, palpitations.  (18 Oct 2024 19:16)    INTERVAL HPI/OVERNIGHT EVENTS:  59y Female s/p __ seen lying comfortably in bed. Tolerating diet. Passing gas/BM. Voiding. [place] in with __ clear urine. Denies headache, weakness, numbness, n/v/d, fevers, chills, chest pain, SOB.     Vital Signs Last 24 Hrs  T(C): 36.5 (20 Oct 2024 06:00), Max: 37.2 (19 Oct 2024 16:55)  T(F): 97.7 (20 Oct 2024 06:00), Max: 99 (19 Oct 2024 16:55)  HR: 64 (20 Oct 2024 06:00) (59 - 90)  BP: 123/75 (20 Oct 2024 06:00) (105/69 - 133/79)  BP(mean): 91 (20 Oct 2024 06:00) (81 - 91)  RR: 16 (20 Oct 2024 06:00) (16 - 18)  SpO2: 97% (20 Oct 2024 06:00) (96% - 100%)    Parameters below as of 20 Oct 2024 06:00  Patient On (Oxygen Delivery Method): room air      PHYSICAL EXAM:  GENERAL: NAD, well-groomed  HEAD:  Atraumatic, normocephalic  JOSE COMA SCORE: E- V- M- =       E: 4= opens eyes spontaneously 3= to voice 2= to noxious 1= no opening       V: 5= oriented 4= confused 3= inappropriate words 2= incomprehensible sounds 1= nonverbal 1T= intubated       M: 6= follows commands 5= localizes 4= withdraws 3= flexor posturing 2= extensor posturing 1= no movement  MENTAL STATUS: AAO x3; Awake/Comatose; Opens eyes spontaneously/to voice/to light touch/to noxious stimuli; Appropriately conversant without aphasia/Nonverbal; following simple commands/mimicking/not following commands  CRANIAL NERVES: Visual acuity normal for age, visual fields full to confrontation, PERRL. EOMI without nystagmus. Facial sensation intact V1-3 distribution b/l. Face symmetric w/ normal eye closure and smile, tongue midline. Hearing grossly intact. Speech clear. Head turning and shoulder shrug intact.   REFLEXES: PERRL. Corneals intact b/l. Gag intact. Cough intact. Oculocephalic reflex intact (Doll's eye). Negative Knott's b/l. Negative clonus b/l  MOTOR: strength 5/5 b/l upper and lower extremities  Uppers     Delt (C5/6)     Bicep (C5/6)     Wrist Extend (C6)     Tricep (C7)     HG (C8/T1)  R                     5/5                 5/5                         5/5                           5/5                   5/5  L                      5/5                 5/5                         5/5                           5/5                   5/5  Lowers      HF(L1/L2)     KE (L3)     DF (L4)     EHL (L5)     PF (S1)      R                     5/5              5/5           5/5           5/5            5/5  L                     5/5               5/5          5/5            5/5            5/5  SENSATION: grossly intact to light touch all extremities  COORDINATION: Gait intact; rapid alternating movements intact; heel to shin intact; no upper extremity dysmetria      LABS:                        14.4   10.15 )-----------( 261      ( 20 Oct 2024 04:06 )             43.9     10-    141  |  104  |  11.6  ----------------------------<  81  4.1   |  23.0  |  0.76    Ca    9.1      19 Oct 2024 04:15  Phos  3.9     10-  Mg     2.1     10-19    TPro  7.0  /  Alb  3.8  /  TBili  0.3[L]  /  DBili  x   /  AST  23  /  ALT  20  /  AlkPhos  83  10-    PT/INR - ( 18 Oct 2024 17:47 )   PT: 12.2 sec;   INR: 1.08 ratio         PTT - ( 18 Oct 2024 17:47 )  PTT:29.9 sec      RADIOLOGY & ADDITIONAL TESTS:    < from: MR Brain Stereotactic w/wo IV Cont (10.18.24 @ 23:33) >    ACC: [id number] EXAM:  MR SELLA ONLY WAW IC   ORDERED BY: BHAVYA MENJIVAR     ACC: [id number] EXAM:  MR BRAIN WAW IC FOR SRS   ORDERED BY: BHAVYA MENJIVAR     PROCEDURE DATE:  10/18/2024      INTERPRETATION:  EXAMINATION: MR BRAIN FOR SRS WITHOUT ANDWITH IV   CONTRAST, MR SELLA WITHOUT AND WITH IV CONTRAST    CLINICAL INDICATION: hx of pituitary adenoma w/ increase in growth  TECHNIQUE: Multiplanar MRI images of the brain and the sella were   obtained before and after IV injection of gadolinium,7 cc administered.  COMPARISON: None available.    FINDINGS:    There is a large mass in the sella and suprasellar cistern measuring   approximately 2.2 x 3.0 x 3.0 cm. There is associated chiasmatic   compression. There is invasion of the left cavernous sinus, with tumor   encasing the C4 segment of the left ICA. There is associated mass effect   on the frontal lobes, which are displaced anteriorly and lateral.    The appearance is consistent with the stated clinical diagnosis of a   pituitary adenoma. Comparison with prior outside examinations recommended   to assess for interval growth. Neurosurgical consultation is recommended.    The suprasellar cistern, optic chiasm, carotids, and cavernous sinuses   are otherwise negative. The hypothalamus and clivus are within normal   limits.    Cerebral volume is within normal limits. No premature white matter   disease.    No acute intracranial hemorrhage. No midline shift or herniation. No   acute ischemia. Intracranial flow voids are patent. The cerebellar   tonsils are normally positioned. The dural venous sinuses are patent,   although this examination was not optimized to evaluate the vasculature.    Limited views of the sinuses and mastoids show mild mucosal thickening   without air-fluid levels, likely chronic.    Limited views of the orbits and visualized soft tissues of the neck,   face, scalp, skull base, and calvarium are otherwise unremarkable.    IMPRESSION:    1.  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    Patient Name: FERNANDO DOMINGUEZ  MRN: [id number], : 61      --- End of Report ---    HAILEY CHANG MD; Attending Radiologist  This document has been electronically signed. Oct 19 2024 12:41AM    < end of copied text >      < from: US Duplex Venous Lower Ext Complete, Bilateral (10.20.24 @ 03:56) >  ACC: [id number] EXAM:  US DPLX LWR EXT VEINS COMPL BI   ORDERED BY: BHAVYA MENJIVAR     PROCEDURE DATE:  10/20/2024      INTERPRETATION:  CLINICAL INFORMATION: Preoperative screening    COMPARISON: None available.    TECHNIQUE: Duplex sonographyof the BILATERAL LOWER extremity veins with   color and spectral Doppler, with and without compression.    FINDINGS:    RIGHT:  Normal compressibility of the RIGHT common femoral, femoral and popliteal   veins.  Doppler examination shows normal spontaneous and phasic flow.  No RIGHT calf vein thrombosis is detected.    LEFT:  Normal compressibility of the LEFT common femoral, femoral and popliteal   veins.  Doppler examination shows normal spontaneous and phasic flow.  No LEFT calf vein thrombosis is detected.    IMPRESSION:  No evidence of deep venous thrombosis in either lower extremity.    --- End of Report ---    DAVID RANDLE MD; Attending Radiologist  This document has been electronically signed. Oct 20 2024  4:07AM    < end of copied text >      < from: Xray Chest 1 View- PORTABLE-Urgent (Xray Chest 1 View- PORTABLE-Urgent .) (10.18.24 @ 20:00) >    ACC: [id number] EXAM:  XR CHEST PORTABLE URGENT 1V   ORDERED BY: YOSELYN SWENSON     PROCEDURE DATE:  10/18/2024      INTERPRETATION:  AP erect chest on 2024 at 7:30 PM. Patient   has chest pain.    COMPARISON: None available.    Heart magnified by technique. Lungs are clear.    IMPRESSION: Clear lungs.    --- End of Report ---      BRODIE ISAAC MD; Attending Radiologist  This document has been electronically signed. Oct 19 2024 11:11AM    < end of copied text > HPI: 58 yo F with hx of TSP for pituitary resection by Dr. Guevara in 2019 presents for blurry vision. Pt has been having blurry vision for a few weeks. Pt went to [facility] for [facility] exam due to blurry vision. They sent her for MRI which was preformed at a [facility] which showed increased size of pituitary macroadenoma with mass effect on optic chiasm. Pt did not have Dr. Guevara's info so presented to [facility]. Pt transferred to [facility].  Pt denies HA, SOB, palpitations.  (18 Oct 2024 19:16)    INTERVAL HPI/OVERNIGHT EVENTS:  59y Female s/p TSP for tumor resection in 2019 seen with stable L blurry vision. Tolerating regular diet. Passing gas/BM. Voiding.  Denies headache, weakness, numbness, n/v/d, fevers, chills, chest pain, SOB.     Vital Signs Last 24 Hrs  T(C): 36.5 (20 Oct 2024 06:00), Max: 37.2 (19 Oct 2024 16:55)  T(F): 97.7 (20 Oct 2024 06:00), Max: 99 (19 Oct 2024 16:55)  HR: 64 (20 Oct 2024 06:00) (59 - 90)  BP: 123/75 (20 Oct 2024 06:00) (105/69 - 133/79)  BP(mean): 91 (20 Oct 2024 06:00) (81 - 91)  RR: 16 (20 Oct 2024 06:00) (16 - 18)  SpO2: 97% (20 Oct 2024 06:00) (96% - 100%)    Parameters below as of 20 Oct 2024 06:00  Patient On (Oxygen Delivery Method): room air    PHYSICAL EXAM:  GENERAL: NAD  HEAD:  Atraumatic, normocephalic  JOSE COMA SCORE: 15  MENTAL STATUS: AAO x3; Awake; Opens eyes spontaneously; Appropriately conversant without aphasia; following simple commands, possible L superior temporal vision loss with reported blurry vision loss in L eye  CRANIAL NERVES: PERRL. EOMI without nystagmus. Facial sensation intact V1-3 distribution b/l. Face symmetric w/ normal eye closure and smile, tongue midline. Hearing grossly intact. Speech clear. Head turning and shoulder shrug intact.   MOTOR: strength 5/5 b/l upper and lower extremities  Uppers     Delt (C5/6)     Bicep (C5/6)     Wrist Extend (C6)     Tricep (C7)     HG (C8/T1)  R                     5/5                 5/5                         5/5                           5/5                   5/5  L                      5/5                 5/5                         5/5                           5/5                   5/5  Lowers      HF(L1/L2)     KE (L3)     DF (L4)     EHL (L5)     PF (S1)      R                     5/5              5/5           5/5           5/5            5/5  L                     5/5               5/5          5/5            5/5            5/5  SENSATION: grossly intact to light touch all extremities  COORDINATION: No drift present      LABS:                        14.4   10.15 )-----------( 261      ( 20 Oct 2024 04:06 )             43.9     10-    141  |  104  |  11.6  ----------------------------<  81  4.1   |  23.0  |  0.76    Ca    9.1      19 Oct 2024 04:15  Phos  3.9     10-  Mg     2.1     10-19    TPro  7.0  /  Alb  3.8  /  TBili  0.3[L]  /  DBili  x   /  AST  23  /  ALT  20  /  AlkPhos  83  10-    PT/INR - ( 18 Oct 2024 17:47 )   PT: 12.2 sec;   INR: 1.08 ratio         PTT - ( 18 Oct 2024 17:47 )  PTT:29.9 sec      RADIOLOGY & ADDITIONAL TESTS:    < from: MR Brain Stereotactic w/wo IV Cont (10.18.24 @ 23:33) >    ACC: [id number] EXAM:  MR SELLA ONLY WAW IC   ORDERED BY: BHAVYA MENJIVAR     ACC: [id number] EXAM:  MR BRAIN WAW IC FOR SRS   ORDERED BY: BHAVYA MENJIVAR     PROCEDURE DATE:  10/18/2024      INTERPRETATION:  EXAMINATION: MR BRAIN FOR SRS WITHOUT ANDWITH IV   CONTRAST, MR SELLA WITHOUT AND WITH IV CONTRAST    CLINICAL INDICATION: hx of pituitary adenoma w/ increase in growth  TECHNIQUE: Multiplanar MRI images of the brain and the sella were   obtained before and after IV injection of gadolinium,7 cc administered.  COMPARISON: None available.    FINDINGS:    There is a large mass in the sella and suprasellar cistern measuring   approximately 2.2 x 3.0 x 3.0 cm. There is associated chiasmatic   compression. There is invasion of the left cavernous sinus, with tumor   encasing the C4 segment of the left ICA. There is associated mass effect   on the frontal lobes, which are displaced anteriorly and lateral.    The appearance is consistent with the stated clinical diagnosis of a   pituitary adenoma. Comparison with prior outside examinations recommended   to assess for interval growth. Neurosurgical consultation is recommended.    The suprasellar cistern, optic chiasm, carotids, and cavernous sinuses   are otherwise negative. The hypothalamus and clivus are within normal   limits.    Cerebral volume is within normal limits. No premature white matter   disease.    No acute intracranial hemorrhage. No midline shift or herniation. No   acute ischemia. Intracranial flow voids are patent. The cerebellar   tonsils are normally positioned. The dural venous sinuses are patent,   although this examination was not optimized to evaluate the vasculature.    Limited views of the sinuses and mastoids show mild mucosal thickening   without air-fluid levels, likely chronic.    Limited views of the orbits and visualized soft tissues of the neck,   face, scalp, skull base, and calvarium are otherwise unremarkable.    IMPRESSION:    1.  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    Patient Name: FERNANDO DOMINGUEZ  MRN: [id number], : 61      --- End of Report ---    HAILEY CHANG MD; Attending Radiologist  This document has been electronically signed. Oct 19 2024 12:41AM    < end of copied text >      < from: US Duplex Venous Lower Ext Complete, Bilateral (10.20.24 @ 03:56) >  ACC: [id number] EXAM:  US DPLX LWR EXT VEINS COMPL BI   ORDERED BY: BHAVYA MENJIVAR     PROCEDURE DATE:  10/20/2024      INTERPRETATION:  CLINICAL INFORMATION: Preoperative screening    COMPARISON: None available.    TECHNIQUE: Duplex sonographyof the BILATERAL LOWER extremity veins with   color and spectral Doppler, with and without compression.    FINDINGS:    RIGHT:  Normal compressibility of the RIGHT common femoral, femoral and popliteal   veins.  Doppler examination shows normal spontaneous and phasic flow.  No RIGHT calf vein thrombosis is detected.    LEFT:  Normal compressibility of the LEFT common femoral, femoral and popliteal   veins.  Doppler examination shows normal spontaneous and phasic flow.  No LEFT calf vein thrombosis is detected.    IMPRESSION:  No evidence of deep venous thrombosis in either lower extremity.    --- End of Report ---    DAVID RANDLE MD; Attending Radiologist  This document has been electronically signed. Oct 20 2024  4:07AM    < end of copied text >      < from: Xray Chest 1 View- PORTABLE-Urgent (Xray Chest 1 View- PORTABLE-Urgent .) (10.18.24 @ 20:00) >    ACC: [id number] EXAM:  XR CHEST PORTABLE URGENT 1V   ORDERED BY: YOSELYN SWENSON     PROCEDURE DATE:  10/18/2024      INTERPRETATION:  AP erect chest on 2024 at 7:30 PM. Patient   has chest pain.    COMPARISON: None available.    Heart magnified by technique. Lungs are clear.    IMPRESSION: Clear lungs.    --- End of Report ---      BRODIE ISAAC MD; Attending Radiologist  This document has been electronically signed. Oct 19 2024 11:11AM    < end of copied text > HPI: 58 yo F with hx of TSP for pituitary resection by Dr. Guevara in 2019 presents for blurry vision. Pt has been having blurry vision for a few weeks. Pt went to [facility] for [facility] exam due to blurry vision. They sent her for MRI which was preformed at a [facility] which showed increased size of pituitary macroadenoma with mass effect on optic chiasm. Pt did not have Dr. Guevara's info so presented to [facility]. Pt transferred to [facility].  Pt denies HA, SOB, palpitations.  (18 Oct 2024 19:16)    INTERVAL HPI/OVERNIGHT EVENTS:  59y Female s/p TSP for tumor resection in 2019 seen with stable L blurry vision especially with reading. Tolerating regular diet. Passing gas/BM and reports LBM 10/20.  Voiding.  Denies headache, pain, numbness, or weakness in upper and lower extremities.     Vital Signs Last 24 Hrs  T(C): 36.5 (20 Oct 2024 06:00), Max: 37.2 (19 Oct 2024 16:55)  T(F): 97.7 (20 Oct 2024 06:00), Max: 99 (19 Oct 2024 16:55)  HR: 64 (20 Oct 2024 06:00) (59 - 90)  BP: 123/75 (20 Oct 2024 06:00) (105/69 - 133/79)  BP(mean): 91 (20 Oct 2024 06:00) (81 - 91)  RR: 16 (20 Oct 2024 06:00) (16 - 18)  SpO2: 97% (20 Oct 2024 06:00) (96% - 100%)    Parameters below as of 20 Oct 2024 06:00  Patient On (Oxygen Delivery Method): room air    PHYSICAL EXAM:  GENERAL: NAD  HEAD:  Atraumatic, normocephalic  JOSE COMA SCORE: 15  MENTAL STATUS: AAO x3; Awake; Opens eyes spontaneously; Appropriately conversant without aphasia; following simple commands, possible L superior temporal vision loss  CRANIAL NERVES: PERRL. EOMI without nystagmus. Facial sensation intact V1-3 distribution b/l. Face symmetric w/ normal eye closure and smile, tongue midline. Hearing grossly intact. Speech clear. Head turning and shoulder shrug intact.   MOTOR: strength 5/5 b/l upper and lower extremities  SENSATION: grossly intact to light touch all extremities  COORDINATION: No drift present      LABS:                        14.4   10.15 )-----------( 261      ( 20 Oct 2024 04:06 )             43.9     10-19    141  |  104  |  11.6  ----------------------------<  81  4.1   |  23.0  |  0.76    Ca    9.1      19 Oct 2024 04:15  Phos  3.9     10-  Mg     2.1     10-19    TPro  7.0  /  Alb  3.8  /  TBili  0.3[L]  /  DBili  x   /  AST  23  /  ALT  20  /  AlkPhos  83  10    PT/INR - ( 18 Oct 2024 17:47 )   PT: 12.2 sec;   INR: 1.08 ratio         PTT - ( 18 Oct 2024 17:47 )  PTT:29.9 sec      RADIOLOGY & ADDITIONAL TESTS:    < from: MR Brain Stereotactic w/wo IV Cont (10.18.24 @ 23:33) >    ACC: [id number] EXAM:  MR SELLA ONLY WAW IC   ORDERED BY: BHAVYA MENJIVAR     ACC: [id number] EXAM:  MR BRAIN WAW IC FOR SRS   ORDERED BY: BHAVYA MENJIVAR     PROCEDURE DATE:  10/18/2024      INTERPRETATION:  EXAMINATION: MR BRAIN FOR SRS WITHOUT ANDWITH IV   CONTRAST, MR SELLA WITHOUT AND WITH IV CONTRAST    CLINICAL INDICATION: hx of pituitary adenoma w/ increase in growth  TECHNIQUE: Multiplanar MRI images of the brain and the sella were   obtained before and after IV injection of gadolinium,7 cc administered.  COMPARISON: None available.    FINDINGS:    There is a large mass in the sella and suprasellar cistern measuring   approximately 2.2 x 3.0 x 3.0 cm. There is associated chiasmatic   compression. There is invasion of the left cavernous sinus, with tumor   encasing the C4 segment of the left ICA. There is associated mass effect   on the frontal lobes, which are displaced anteriorly and lateral.    The appearance is consistent with the stated clinical diagnosis of a   pituitary adenoma. Comparison with prior outside examinations recommended   to assess for interval growth. Neurosurgical consultation is recommended.    The suprasellar cistern, optic chiasm, carotids, and cavernous sinuses   are otherwise negative. The hypothalamus and clivus are within normal   limits.    Cerebral volume is within normal limits. No premature white matter   disease.    No acute intracranial hemorrhage. No midline shift or herniation. No   acute ischemia. Intracranial flow voids are patent. The cerebellar   tonsils are normally positioned. The dural venous sinuses are patent,   although this examination was not optimized to evaluate the vasculature.    Limited views of the sinuses and mastoids show mild mucosal thickening   without air-fluid levels, likely chronic.    Limited views of the orbits and visualized soft tissues of the neck,   face, scalp, skull base, and calvarium are otherwise unremarkable.    IMPRESSION:    1.  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    Patient Name: FERNANDO DOMINGUEZ  MRN: [id number], : 61      --- End of Report ---    HAILEY CHANG MD; Attending Radiologist  This document has been electronically signed. Oct 19 2024 12:41AM    < end of copied text >      < from: US Duplex Venous Lower Ext Complete, Bilateral (10.20.24 @ 03:56) >  ACC: [id number] EXAM:  US DPLX LWR EXT VEINS COMPL BI   ORDERED BY: BHAVYA MENJIVAR     PROCEDURE DATE:  10/20/2024      INTERPRETATION:  CLINICAL INFORMATION: Preoperative screening    COMPARISON: None available.    TECHNIQUE: Duplex sonographyof the BILATERAL LOWER extremity veins with   color and spectral Doppler, with and without compression.    FINDINGS:    RIGHT:  Normal compressibility of the RIGHT common femoral, femoral and popliteal   veins.  Doppler examination shows normal spontaneous and phasic flow.  No RIGHT calf vein thrombosis is detected.    LEFT:  Normal compressibility of the LEFT common femoral, femoral and popliteal   veins.  Doppler examination shows normal spontaneous and phasic flow.  No LEFT calf vein thrombosis is detected.    IMPRESSION:  No evidence of deep venous thrombosis in either lower extremity.    --- End of Report ---    DAVID RANDLE MD; Attending Radiologist  This document has been electronically signed. Oct 20 2024  4:07AM    < end of copied text >      < from: Xray Chest 1 View- PORTABLE-Urgent (Xray Chest 1 View- PORTABLE-Urgent .) (10.18.24 @ 20:00) >    ACC: [id number] EXAM:  XR CHEST PORTABLE URGENT 1V   ORDERED BY: YOSELYN SWENSON     PROCEDURE DATE:  10/18/2024      INTERPRETATION:  AP erect chest on 2024 at 7:30 PM. Patient   has chest pain.    COMPARISON: None available.    Heart magnified by technique. Lungs are clear.    IMPRESSION: Clear lungs.    --- End of Report ---      BRODIE ISAAC MD; Attending Radiologist  This document has been electronically signed. Oct 19 2024 11:11AM    < end of copied text > HPI: 60 yo F with hx of TSP for pituitary resection by Dr. Guevara in 2019 presents for blurry vision. Pt has been having blurry vision for a few weeks. Pt went to [facility] for [facility] exam due to blurry vision. They sent her for MRI which was preformed at a [facility] which showed increased size of pituitary macroadenoma with mass effect on optic chiasm. Pt did not have Dr. Guevara's info so presented to [facility]. Pt transferred to [facility].  Pt denies HA, SOB, palpitations.  (18 Oct 2024 19:16)    INTERVAL HPI/OVERNIGHT EVENTS:  59y Female s/p TSP for tumor resection in 2019 seen with stable L blurry vision especially with reading. Tolerating regular diet. Passing gas/BM and reports LBM 10/20.  Voiding.  Denies headache, pain, numbness, or weakness in upper and lower extremities.     Vital Signs Last 24 Hrs  T(C): 36.5 (20 Oct 2024 06:00), Max: 37.2 (19 Oct 2024 16:55)  T(F): 97.7 (20 Oct 2024 06:00), Max: 99 (19 Oct 2024 16:55)  HR: 64 (20 Oct 2024 06:00) (59 - 90)  BP: 123/75 (20 Oct 2024 06:00) (105/69 - 133/79)  BP(mean): 91 (20 Oct 2024 06:00) (81 - 91)  RR: 16 (20 Oct 2024 06:00) (16 - 18)  SpO2: 97% (20 Oct 2024 06:00) (96% - 100%)  Parameters below as of 20 Oct 2024 06:00  Patient On (Oxygen Delivery Method): room air    PHYSICAL EXAM:  GENERAL: NAD  HEAD: Atraumatic, normocephalic  JOSE COMA SCORE: 15  MENTAL STATUS: AAO x3; Awake; Opens eyes spontaneously; Appropriately conversant without aphasia; following simple commands, possible L superior temporal vision loss  CRANIAL NERVES: PERRL. EOMI without nystagmus. Facial sensation intact V1-3 distribution b/l. Face symmetric w/ normal eye closure and smile, tongue midline. Hearing grossly intact. Speech clear. Head turning and shoulder shrug intact.   MOTOR: strength 5/5 b/l upper and lower extremities  SENSATION: grossly intact to light touch all extremities  COORDINATION: No drift present    LABS:                        14.4   10.15 )-----------( 261      ( 20 Oct 2024 04:06 )             43.9     10-19    141  |  104  |  11.6  ----------------------------<  81  4.1   |  23.0  |  0.76    Ca    9.1      19 Oct 2024 04:15  Phos  3.9     10-19  Mg     2.1     10-19    TPro  7.0  /  Alb  3.8  /  TBili  0.3[L]  /  DBili  x   /  AST  23  /  ALT  20  /  AlkPhos  83  10-19    PT/INR - ( 18 Oct 2024 17:47 )   PT: 12.2 sec;   INR: 1.08 ratio    PTT - ( 18 Oct 2024 17:47 )  PTT:29.9 sec    RADIOLOGY & ADDITIONAL TESTS:    MR Brain Stereotactic w/wo IV Cont (10.18.24 @ 23:33)  IMPRESSION:  1.  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    US Duplex Venous Lower Ext Complete, Bilateral (10.20.24 @ 03:56)  IMPRESSION:  No evidence of deep venous thrombosis in either lower extremity.    Xray Chest 1 View- PORTABLE-Urgent (Xray Chest 1 View- PORTABLE-Urgent .) (10.18.24 @ 20:00) >  IMPRESSION: Clear lungs.

## 2024-10-20 NOTE — PROGRESS NOTE ADULT - SUBJECTIVE AND OBJECTIVE BOX
FERNANDO DOMINGUEZ    [id number]    59y      Female    Patient is a 59y old  Female who presents with a chief complaint of Pituitary Mass (20 Oct 2024 07:09)      INTERVAL HPI/OVERNIGHT EVENTS:    Patient is doing ok, denies any further chest pain, has no sob, dizziness, nausea, vomiting, no worsening of blurry vision      Vital Signs Last 24 Hrs  T(C): 37.3 (20 Oct 2024 12:10), Max: 37.3 (20 Oct 2024 12:10)  T(F): 99.1 (20 Oct 2024 12:10), Max: 99.1 (20 Oct 2024 12:10)  HR: 114 (20 Oct 2024 12:10) (60 - 114)  BP: 150/97 (20 Oct 2024 12:10) (105/69 - 150/97)  BP(mean): 91 (20 Oct 2024 06:00) (81 - 91)  RR: 18 (20 Oct 2024 12:10) (16 - 18)  SpO2: 96% (20 Oct 2024 12:10) (96% - 99%)    Parameters below as of 20 Oct 2024 12:10  Patient On (Oxygen Delivery Method): room air        PHYSICAL EXAM:    GENERAL: Middle age female looking comfortable  NECK: soft, Supple, No JVD  CHEST/LUNG: Clear to auscultate bilaterally; No wheezing  HEART: S1S2+, Regular rate and rhythm; No murmurs  ABDOMEN: Soft, Nontender, Nondistended; Bowel sounds present  EXTREMITIES:  1+ Peripheral Pulses, No edema  SKIN: No rashes or lesions  NEURO: AAOX3  PSYCH: normal mood    LABS:                        14.4   10.15 )-----------( 261      ( 20 Oct 2024 04:06 )             43.9     10-20    138  |  103  |  13.7  ----------------------------<  126[H]  4.8   |  20.0[L]  |  0.76    Ca    9.4      20 Oct 2024 04:06  Phos  2.4     10-20  Mg     2.5     10-20    TPro  7.0  /  Alb  3.8  /  TBili  0.3[L]  /  DBili  x   /  AST  23  /  ALT  20  /  AlkPhos  83  10-19    PT/INR - ( 18 Oct 2024 17:47 )   PT: 12.2 sec;   INR: 1.08 ratio         PTT - ( 18 Oct 2024 17:47 )  PTT:29.9 sec      I&O's Summary      MEDICATIONS  (STANDING):  dexAMETHasone  Injectable 4 milliGRAM(s) IV Push every 6 hours  dextrose 5%. 1000 milliLiter(s) (100 mL/Hr) IV Continuous <Continuous>  dextrose 5%. 1000 milliLiter(s) (50 mL/Hr) IV Continuous <Continuous>  dextrose 50% Injectable 25 Gram(s) IV Push once  dextrose 50% Injectable 12.5 Gram(s) IV Push once  dextrose 50% Injectable 25 Gram(s) IV Push once  enoxaparin Injectable 40 milliGRAM(s) SubCutaneous every 24 hours  glucagon  Injectable 1 milliGRAM(s) IntraMuscular once  insulin lispro (ADMELOG) corrective regimen sliding scale   SubCutaneous three times a day before meals  insulin lispro (ADMELOG) corrective regimen sliding scale   SubCutaneous at bedtime  multivitamin 1 Tablet(s) Oral daily  pantoprazole  Injectable 40 milliGRAM(s) IV Push daily  polyethylene glycol 3350 17 Gram(s) Oral daily  senna 2 Tablet(s) Oral at bedtime  sodium chloride 0.9%. 1000 milliLiter(s) (60 mL/Hr) IV Continuous <Continuous>    MEDICATIONS  (PRN):  acetaminophen     Tablet .. 650 milliGRAM(s) Oral every 6 hours PRN Mild Pain (1 - 3)  dextrose Oral Gel 15 Gram(s) Oral once PRN Blood Glucose LESS THAN 70 milliGRAM(s)/deciliter  ondansetron Injectable 4 milliGRAM(s) IV Push every 6 hours PRN Nausea and/or Vomiting

## 2024-10-20 NOTE — CONSULT NOTE ADULT - SUBJECTIVE AND OBJECTIVE BOX
HPI:  59 y.o.  Female from [place] who has Hx of transphenoidal pituitary resection by Dr. Guevara in 2019 presented with c/o visual disturbances. Patient was recently evaluated by [facility] and pituitary MRI on 10/10/24 at  showed 3.3 x 2.3 x 1.9 cm (previously 2.7 x 1.8 x 2.0 cm) pituitary tumor extending into the suprasellar cistern and left cavernous sinus. Mass effect is extended on the optic chiasm L>R. Patient reports intermittent visual disturbances in left eye but denies headaches or other complaints such as low BP or nipple discharge.       REVIEW OF SYSTEMS:    CONSTITUTIONAL: No fever, weight loss, or fatigue  EYES: + visual disturbances,   ENMT:  No difficulty hearing, tinnitus, vertigo; No sinus or throat pain  NECK: No pain or stiffness  RESPIRATORY: No cough, wheezing, chills or hemoptysis; No shortness of breath  CARDIOVASCULAR: No chest pain, palpitations, dizziness, or leg swelling  GASTROINTESTINAL: No abdominal or epigastric pain. No nausea, vomiting, or hematemesis; No diarrhea or constipation. No melena or hematochezia.  NEUROLOGICAL: No headaches, memory loss, loss of strength, numbness, or tremors  SKIN: No itching, burning, rashes, or lesions   MUSCULOSKELETAL: No joint pain or swelling; No muscle, back, or extremity pain  PSYCHIATRIC: No depression, anxiety, mood swings, or difficulty sleeping    No Known Allergies    MEDICATIONS  (STANDING):  dexAMETHasone  Injectable 4 milliGRAM(s) IV Push every 6 hours  dextrose 5%. 1000 milliLiter(s) (100 mL/Hr) IV Continuous <Continuous>  dextrose 5%. 1000 milliLiter(s) (50 mL/Hr) IV Continuous <Continuous>  dextrose 50% Injectable 25 Gram(s) IV Push once  dextrose 50% Injectable 12.5 Gram(s) IV Push once  dextrose 50% Injectable 25 Gram(s) IV Push once  enoxaparin Injectable 40 milliGRAM(s) SubCutaneous every 24 hours  glucagon  Injectable 1 milliGRAM(s) IntraMuscular once  insulin lispro (ADMELOG) corrective regimen sliding scale   SubCutaneous three times a day before meals  insulin lispro (ADMELOG) corrective regimen sliding scale   SubCutaneous at bedtime  multivitamin 1 Tablet(s) Oral daily  pantoprazole  Injectable 40 milliGRAM(s) IV Push daily  polyethylene glycol 3350 17 Gram(s) Oral daily  senna 2 Tablet(s) Oral at bedtime  sodium chloride 0.9%. 1000 milliLiter(s) (60 mL/Hr) IV Continuous <Continuous>    MEDICATIONS  (PRN):  acetaminophen     Tablet .. 650 milliGRAM(s) Oral every 6 hours PRN Mild Pain (1 - 3)  dextrose Oral Gel 15 Gram(s) Oral once PRN Blood Glucose LESS THAN 70 milliGRAM(s)/deciliter  ondansetron Injectable 4 milliGRAM(s) IV Push every 6 hours PRN Nausea and/or Vomiting      Vital Signs Last 24 Hrs  T(C): 36.9 (20 Oct 2024 15:52), Max: 37.3 (20 Oct 2024 12:10)  T(F): 98.5 (20 Oct 2024 15:52), Max: 99.1 (20 Oct 2024 12:10)  HR: 106 (20 Oct 2024 15:52) (60 - 114)  BP: 139/83 (20 Oct 2024 15:52) (105/69 - 150/97)  BP(mean): 91 (20 Oct 2024 06:00) (81 - 91)  RR: 18 (20 Oct 2024 15:52) (16 - 18)  SpO2: 98% (20 Oct 2024 15:52) (96% - 99%)    Parameters below as of 20 Oct 2024 15:52  Patient On (Oxygen Delivery Method): room air      Weight (kg): 68 (10-18-24 @ 17:19)    Physical Exam:    Constitutional: NAD,  HEENT: EOMI, no exophalmos  Neck: trachea midline, no thyroid enlargement  Respiratory: CTAB, normal respirations  Cardiovascular: S1 and S2, RRR  Gastrointestinal: BS+, soft, ntnd  Extremities: No peripheral edema  Neurological: AOx3, no focal deficits  Psychiatric: Normal mood and normal affect  Skin: warm and dry    LABS  10-20    138  |  103  |  13.7  ----------------------------<  126[H]  4.8   |  20.0[L]  |  0.76    Ca    9.4      20 Oct 2024 04:06  Phos  2.4     10-20  Mg     2.5     10-20    TPro  7.0  /  Alb  3.8  /  TBili  0.3[L]  /  DBili  x   /  AST  23  /  ALT  20  /  AlkPhos  83  10-19                          14.4   10.15 )-----------( 261      ( 20 Oct 2024 04:06 )             43.9     Cholesterol: 221 mg/dL (10-20-24 @ 04:06)  HDL Cholesterol: 43 mg/dL (10-20-24 @ 04:06)  Triglycerides, Serum: 95 mg/dL (10-20-24 @ 04:06)    Thyroid Stimulating Hormone, Serum: 2.19 uIU/mL (10-19-24 @ 04:15)  A1C with Estimated Average Glucose Result: 5.7 % (10-19-24 @ 04:15)  T4, Serum: 6.9 ug/dL (10-19-24 @ 04:15)    Alkaline Phosphatase: 83 U/L (10-19-24 @ 04:15)  Albumin: 3.8 g/dL (10-19-24 @ 04:15)  Aspartate Aminotransferase (AST/SGOT): 23 U/L (10-19-24 @ 04:15)  Alanine Aminotransferase (ALT/SGPT): 20 U/L (10-19-24 @ 04:15)  Aspartate Aminotransferase (AST/SGOT): 29 U/L (10-18-24 @ 17:47)  Alkaline Phosphatase: 94 U/L (10-18-24 @ 17:47)  Alanine Aminotransferase (ALT/SGPT): 24 U/L (10-18-24 @ 17:47)  Albumin: 4.4 g/dL (10-18-24 @ 17:47)    CAPILLARY BLOOD GLUCOSE    POCT Blood Glucose.: 142 mg/dL (20 Oct 2024 11:57)  POCT Blood Glucose.: 129 mg/dL (20 Oct 2024 08:46)  POCT Blood Glucose.: 124 mg/dL (19 Oct 2024 21:24)  POCT Blood Glucose.: 141 mg/dL (19 Oct 2024 19:09)

## 2024-10-20 NOTE — CONSULT NOTE ADULT - ASSESSMENT
59 y.o.  Female from [facility] [place] who has Hx of transphenoidal pituitary resection by Dr. Guevara in 2019 presented with c/o visual disturbances. Recently evaluated by [facility]. 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 Never required steroids  Started now on Decadron preoperatively  Continue current regimen  Neurochecks as per protocol  Plan for surgery next week  Endocrinology will follow after surgery.

## 2024-10-20 NOTE — CONSULT NOTE ADULT - NS ATTEND AMEND GEN_ALL_CORE FT
Patient seen and examined by me.  Chaperone: Ivett Ayers NP    T(C): 36.9 (10-20-24 @ 15:52), Max: 37.3 (10-20-24 @ 12:10)  HR: 106 (10-20-24 @ 15:52) (60 - 114)  BP: 139/83 (10-20-24 @ 15:52) (105/69 - 150/97)  RR: 18 (10-20-24 @ 15:52) (16 - 18)  SpO2: 98% (10-20-24 @ 15:52) (96% - 99%)  Patient alert and awake.  Chest- Bilateral Clear BS  Cardiac- S1 and S2  Abdomen- Soft    Assessment/Plan:    I have discussed my recommendation with the PA which are outlined above.  Will follow. Patient seen and examined by me.  Chaperone: Trevor manning, SNPCA    T(C): 36.9 (10-20-24 @ 15:52), Max: 37.3 (10-20-24 @ 12:10)  HR: 106 (10-20-24 @ 15:52) (60 - 114)  BP: 139/83 (10-20-24 @ 15:52) (105/69 - 150/97)  RR: 18 (10-20-24 @ 15:52) (16 - 18)  SpO2: 98% (10-20-24 @ 15:52) (96% - 99%)  Patient alert and awake.  Chest- Bilateral Clear BS  Cardiac- S1 and S2  Abdomen- Soft    Assessment/Plan:    I have discussed my recommendation with the PA which are outlined above.  Will follow.

## 2024-10-20 NOTE — CONSULT NOTE ADULT - PROBLEM SELECTOR RECOMMENDATION 9
- Patient presenting with pituitary macroadenoma w/ mass effect on optic chiasm   - plan for surgery   - cardiology consulted for risk stratification  - remote history of intermittent angina  - will need echo and nuclear stress test for clearance  - screen lipid profile and hgbA1c   - will provide risk stratification tomorrow after testing in a chart note.

## 2024-10-20 NOTE — PROGRESS NOTE ADULT - ASSESSMENT
58 yo F with hx of TSP for pituitary resection by Dr. Guevara in 2019 presents for blurry vision, she has been having blurry vision for a few weeks. Pt went to [facility] for eye exam due to blurry vision. They sent her for MRI which was preformed at a [facility] which showed increased size of pituitary macroadenoma with mass effect on optic chiasm., came in for admitted under Neurosurgery for further Management, imaging done here showed  Large mass in the sella and suprasellar cistern, with chiasmatic encroachment, over the course she has been complaining of intermittent chest pain, she has this chest pain for 3 weeks, no chest tenderness, no relation with exertion, EKG with no acute changes, trop 1st set done came negative too, TTE pending, medicine consulted for Medical Management.     Plan:     Blurry Vission/  Large mass in the sella and suprasellar cistern, with chiasmatic encroachment:   Management as per Neurosurgery   Neuro checks  Plan for surgery during this admission  Hormone levels to monitor   Endocrine consult   monitor prolactin level as it is elevated.       Intermittent chest pain:   EKG with no acute changes  trops negative  TTE done and is unremarkable  will continue to monitor  obtain lipid panel in am   Trop in am  PRN pain meds   could not give aspirin given upcoming surgery  would recommend cardiology consult as patient might require stress test before procedure.    Prediabetes: Counselled for low carb diet.     HLD: her LDL is 159, would start atorvastatin 20mg daily.     Patient denies Hx of exertional dysnea, no personal or family hx of easy bleeding, Patient's METS Score is >4  and RCRI is 0, patient labs, EKG and Echo reviewed, patient is at intermediate risk for perioperative cardiovascular complication and is optimized from the medicine point of view for planned procedure, given Hx of intermittent chest pain, would require Cardiology consult for possible stress test before proceeding for procedure

## 2024-10-20 NOTE — CONSULT NOTE ADULT - SUBJECTIVE AND OBJECTIVE BOX
[facility]                                              CARDIOLOGY AT [place] [address]                                             Telephone: [phone]. Fax:[phone]                                             NON-INVASIVE CARDIOLOGY CONSULTATION NOTE                                                                                             History obtained by: Patient and medical record   Community Cardiologist: None    obtained: Yes [  ] No [  ]  Reason for Consultation: pre-operative risk stratification   Available out pt records reviewed: Yes [  ] No [  ]    Chief complaint:    Patient is a 59y old  Female who presents with a chief complaint of Pituitary Mass (20 Oct 2024 15:52)      HPI:  60 yo F with hx of TSP for pituitary resection by Dr. Guevara in 2019 presents for blurry vision. Pt has been having blurry vision for a few weeks. Pt went to [facility] for eye exam due to blurry vision. They sent her for MRI which was preformed at a [facility] which showed increased size of pituitary macroadenoma with mass effect on optic chiasm. Pt did not have Dr. Guevara's info so presented to [facility]. Pt transferred to [facility].   Pt denies HA, SOB, palpitations.  (18 Oct 2024 19:16)    PAST MEDICAL HISTORY  History of pituitary surgery    PAST SURGICAL HISTORY  S/P  section    History of pituitary tumor    SUBSTANCE USE HISTORY  Denies current and previous substance use [  ]   CIGARETTES -   ALCOHOL -   DRUGS -     FAMILY HISTORY:    CARDIAC SPECIFIC FAMILY HX   No KNOWN family history of Cardiovascular disease, CAD, or sudden death in first degree relatives unless specified below  Family History of Cardiovascular Disease:  [  ]   Coronary Artery Disease in first degree relative:  [  ]   Sudden Cardiac Death in First degree relative: [  ]    HOME MEDICATIONS:      CURRENT CARDIAC MEDICATIONS:      CURRENT OTHER MEDICATIONS:  acetaminophen     Tablet .. 650 milliGRAM(s) Oral every 6 hours PRN Mild Pain (1 - 3)  ondansetron Injectable 4 milliGRAM(s) IV Push every 6 hours PRN Nausea and/or Vomiting  pantoprazole  Injectable 40 milliGRAM(s) IV Push daily  polyethylene glycol 3350 17 Gram(s) Oral daily  senna 2 Tablet(s) Oral at bedtime  dexAMETHasone  Injectable 4 milliGRAM(s) IV Push every 6 hours  dextrose 5%. 1000 milliLiter(s) (50 mL/Hr) IV Continuous <Continuous>  dextrose 5%. 1000 milliLiter(s) (100 mL/Hr) IV Continuous <Continuous>  dextrose 50% Injectable 25 Gram(s) IV Push once, Stop order after: 1 Doses  dextrose 50% Injectable 12.5 Gram(s) IV Push once, Stop order after: 1 Doses  dextrose 50% Injectable 25 Gram(s) IV Push once, Stop order after: 1 Doses  dextrose Oral Gel 15 Gram(s) Oral once, Stop order after: 1 Doses PRN Blood Glucose LESS THAN 70 milliGRAM(s)/deciliter  enoxaparin Injectable 40 milliGRAM(s) SubCutaneous every 24 hours  glucagon  Injectable 1 milliGRAM(s) IntraMuscular once, Stop order after: 1 Doses  insulin lispro (ADMELOG) corrective regimen sliding scale   SubCutaneous three times a day before meals  insulin lispro (ADMELOG) corrective regimen sliding scale   SubCutaneous at bedtime  multivitamin 1 Tablet(s) Oral daily  sodium chloride 0.9%. 1000 milliLiter(s) (60 mL/Hr) IV Continuous <Continuous>      ALLERGIES:   No Known Allergies      VITAL SIGNS:  T(C): 36.9 (10-20-24 @ 15:52), Max: 37.3 (10-20-24 @ 12:10)  T(F): 98.5 (10-20-24 @ 15:52), Max: 99.1 (10-20-24 @ 12:10)  HR: 106 (10-20-24 @ 15:52) (60 - 114)  BP: 139/83 (10-20-24 @ 15:52) (105/69 - 150/97)  RR: 18 (10-20-24 @ 15:52) (16 - 18)  SpO2: 98% (10-20-24 @ 15:52) (96% - 99%)    INTAKE AND OUTPUT:       LABS:                            14.4   10.15 )-----------( 261      ( 20 Oct 2024 04:06 )             43.9     10-20    138  |  103  |  13.7  ----------------------------<  126[H]  4.8   |  20.0[L]  |  0.76    Ca    9.4      20 Oct 2024 04:06  Phos  2.4     10-20  Mg     2.5     10-20    TPro  7.0  /  Alb  3.8  /  TBili  0.3[L]  /  DBili  x   /  AST  23  /  ALT  20  /  AlkPhos  83  10-19    PT/INR - ( 18 Oct 2024 17:47 )   PT: 12.2 sec;   INR: 1.08 ratio         PTT - ( 18 Oct 2024 17:47 )  PTT:29.9 sec  Urinalysis Basic - ( 20 Oct 2024 04:06 )    Color: x / Appearance: x / SG: x / pH: x  Gluc: 126 mg/dL / Ketone: x  / Bili: x / Urobili: x   Blood: x / Protein: x / Nitrite: x   Leuk Esterase: x / RBC: x / WBC x   Sq Epi: x / Non Sq Epi: x / Bacteria: x      10-20-24 @ 04:06  CHolesterol: 221,  HDL: 43,  LDL: 159, Triglycerides: 95     RADIOLOGY IMAGING:

## 2024-10-21 ENCOUNTER — RESULT REVIEW (OUTPATIENT)
Age: 59
End: 2024-10-21

## 2024-10-21 LAB
ANION GAP SERPL CALC-SCNC: 10 MMOL/L — SIGNIFICANT CHANGE UP (ref 5–17)
BUN SERPL-MCNC: 17.4 MG/DL — SIGNIFICANT CHANGE UP (ref 8–20)
CALCIUM SERPL-MCNC: 9.3 MG/DL — SIGNIFICANT CHANGE UP (ref 8.4–10.5)
CHLORIDE SERPL-SCNC: 108 MMOL/L — SIGNIFICANT CHANGE UP (ref 96–108)
CO2 SERPL-SCNC: 22 MMOL/L — SIGNIFICANT CHANGE UP (ref 22–29)
CREAT SERPL-MCNC: 0.93 MG/DL — SIGNIFICANT CHANGE UP (ref 0.5–1.3)
EGFR: 70 ML/MIN/1.73M2 — SIGNIFICANT CHANGE UP
GLUCOSE BLDC GLUCOMTR-MCNC: 106 MG/DL — HIGH (ref 70–99)
GLUCOSE BLDC GLUCOMTR-MCNC: 127 MG/DL — HIGH (ref 70–99)
GLUCOSE BLDC GLUCOMTR-MCNC: 95 MG/DL — SIGNIFICANT CHANGE UP (ref 70–99)
GLUCOSE SERPL-MCNC: 142 MG/DL — HIGH (ref 70–99)
HCT VFR BLD CALC: 40.5 % — SIGNIFICANT CHANGE UP (ref 34.5–45)
HGB BLD-MCNC: 13.5 G/DL — SIGNIFICANT CHANGE UP (ref 11.5–15.5)
MAGNESIUM SERPL-MCNC: 2.1 MG/DL — SIGNIFICANT CHANGE UP (ref 1.6–2.6)
MCHC RBC-ENTMCNC: 30.1 PG — SIGNIFICANT CHANGE UP (ref 27–34)
MCHC RBC-ENTMCNC: 33.3 GM/DL — SIGNIFICANT CHANGE UP (ref 32–36)
MCV RBC AUTO: 90.4 FL — SIGNIFICANT CHANGE UP (ref 80–100)
PHOSPHATE SERPL-MCNC: 2.5 MG/DL — SIGNIFICANT CHANGE UP (ref 2.4–4.7)
PLATELET # BLD AUTO: 294 K/UL — SIGNIFICANT CHANGE UP (ref 150–400)
POTASSIUM SERPL-MCNC: 4.5 MMOL/L — SIGNIFICANT CHANGE UP (ref 3.5–5.3)
POTASSIUM SERPL-SCNC: 4.5 MMOL/L — SIGNIFICANT CHANGE UP (ref 3.5–5.3)
RBC # BLD: 4.48 M/UL — SIGNIFICANT CHANGE UP (ref 3.8–5.2)
RBC # FLD: 13.4 % — SIGNIFICANT CHANGE UP (ref 10.3–14.5)
SODIUM SERPL-SCNC: 140 MMOL/L — SIGNIFICANT CHANGE UP (ref 135–145)
WBC # BLD: 21.38 K/UL — HIGH (ref 3.8–10.5)
WBC # FLD AUTO: 21.38 K/UL — HIGH (ref 3.8–10.5)

## 2024-10-21 PROCEDURE — 99232 SBSQ HOSP IP/OBS MODERATE 35: CPT

## 2024-10-21 PROCEDURE — 93018 CV STRESS TEST I&R ONLY: CPT

## 2024-10-21 PROCEDURE — 99232 SBSQ HOSP IP/OBS MODERATE 35: CPT | Mod: 25

## 2024-10-21 PROCEDURE — 78452 HT MUSCLE IMAGE SPECT MULT: CPT | Mod: 26

## 2024-10-21 PROCEDURE — 93016 CV STRESS TEST SUPVJ ONLY: CPT

## 2024-10-21 RX ORDER — ROSUVASTATIN CALCIUM 10 MG
5 TABLET ORAL AT BEDTIME
Refills: 0 | Status: DISCONTINUED | OUTPATIENT
Start: 2024-10-21 | End: 2024-10-23

## 2024-10-21 RX ADMIN — DEXAMETHASONE 1.5 MG 4 MILLIGRAM(S): 1.5 TABLET ORAL at 23:27

## 2024-10-21 RX ADMIN — SODIUM CHLORIDE 60 MILLILITER(S): 9 INJECTION, SOLUTION INTRAMUSCULAR; INTRAVENOUS; SUBCUTANEOUS at 00:30

## 2024-10-21 RX ADMIN — SODIUM CHLORIDE 60 MILLILITER(S): 9 INJECTION, SOLUTION INTRAMUSCULAR; INTRAVENOUS; SUBCUTANEOUS at 16:58

## 2024-10-21 RX ADMIN — Medication 5 MILLIGRAM(S): at 21:45

## 2024-10-21 RX ADMIN — PANTOPRAZOLE SODIUM 40 MILLIGRAM(S): 40 TABLET, DELAYED RELEASE ORAL at 11:44

## 2024-10-21 RX ADMIN — DEXAMETHASONE 1.5 MG 4 MILLIGRAM(S): 1.5 TABLET ORAL at 11:45

## 2024-10-21 RX ADMIN — DEXAMETHASONE 1.5 MG 4 MILLIGRAM(S): 1.5 TABLET ORAL at 00:29

## 2024-10-21 RX ADMIN — POLYETHYLENE GLYCOL 3350 17 GRAM(S): 17 POWDER, FOR SOLUTION ORAL at 11:43

## 2024-10-21 RX ADMIN — SODIUM CHLORIDE 60 MILLILITER(S): 9 INJECTION, SOLUTION INTRAMUSCULAR; INTRAVENOUS; SUBCUTANEOUS at 23:27

## 2024-10-21 RX ADMIN — Medication 40 MILLIGRAM(S): at 11:44

## 2024-10-21 RX ADMIN — DEXAMETHASONE 1.5 MG 4 MILLIGRAM(S): 1.5 TABLET ORAL at 05:45

## 2024-10-21 RX ADMIN — Medication 2 TABLET(S): at 21:45

## 2024-10-21 RX ADMIN — Medication 1 TABLET(S): at 11:44

## 2024-10-21 RX ADMIN — DEXAMETHASONE 1.5 MG 4 MILLIGRAM(S): 1.5 TABLET ORAL at 16:58

## 2024-10-21 RX ADMIN — SODIUM CHLORIDE 60 MILLILITER(S): 9 INJECTION, SOLUTION INTRAMUSCULAR; INTRAVENOUS; SUBCUTANEOUS at 21:45

## 2024-10-21 NOTE — PROGRESS NOTE ADULT - SUBJECTIVE AND OBJECTIVE BOX
INTERVAL EVENTS:  Follow up pituitary adenoma.  #[id number]    MEDICATIONS  (STANDING):  dexAMETHasone  Injectable 4 milliGRAM(s) IV Push every 6 hours  dextrose 5%. 1000 milliLiter(s) (100 mL/Hr) IV Continuous <Continuous>  dextrose 5%. 1000 milliLiter(s) (50 mL/Hr) IV Continuous <Continuous>  dextrose 50% Injectable 12.5 Gram(s) IV Push once  dextrose 50% Injectable 25 Gram(s) IV Push once  dextrose 50% Injectable 25 Gram(s) IV Push once  enoxaparin Injectable 40 milliGRAM(s) SubCutaneous every 24 hours  glucagon  Injectable 1 milliGRAM(s) IntraMuscular once  insulin lispro (ADMELOG) corrective regimen sliding scale   SubCutaneous three times a day before meals  insulin lispro (ADMELOG) corrective regimen sliding scale   SubCutaneous at bedtime  multivitamin 1 Tablet(s) Oral daily  pantoprazole  Injectable 40 milliGRAM(s) IV Push daily  polyethylene glycol 3350 17 Gram(s) Oral daily  rosuvastatin 5 milliGRAM(s) Oral at bedtime  senna 2 Tablet(s) Oral at bedtime  sodium chloride 0.9%. 1000 milliLiter(s) (60 mL/Hr) IV Continuous <Continuous>    MEDICATIONS  (PRN):  acetaminophen     Tablet .. 650 milliGRAM(s) Oral every 6 hours PRN Mild Pain (1 - 3)  dextrose Oral Gel 15 Gram(s) Oral once PRN Blood Glucose LESS THAN 70 milliGRAM(s)/deciliter  ondansetron Injectable 4 milliGRAM(s) IV Push every 6 hours PRN Nausea and/or Vomiting    Allergies  No Known Allergies    Vital Signs Last 24 Hrs  T(C): 36.8 (21 Oct 2024 12:18), Max: 37.1 (20 Oct 2024 18:00)  T(F): 98.3 (21 Oct 2024 12:18), Max: 98.7 (20 Oct 2024 18:00)  HR: 100 (21 Oct 2024 12:18) (70 - 107)  BP: 150/93 (21 Oct 2024 12:18) (114/70 - 150/93)  BP(mean): 86 (20 Oct 2024 23:00) (86 - 86)  RR: 18 (21 Oct 2024 12:18) (17 - 20)  SpO2: 99% (21 Oct 2024 12:18) (95% - 99%)    Parameters below as of 21 Oct 2024 12:18  Patient On (Oxygen Delivery Method): room air    PHYSICAL EXAM:  General: No apparent distress  Respiratory: Lungs clear bilaterally  Cardiac: +S1, S2, no m/r/g  GI: +BS, soft, non tender, non distended  Extremities: No peripheral edema  Neuro: A+O X3    LABS:                        13.5   21.38 )-----------( 294      ( 21 Oct 2024 05:00 )             40.5     10-21    140  |  108  |  17.4  ----------------------------<  142[H]  4.5   |  22.0  |  0.93    Ca    9.3      21 Oct 2024 05:00  Phos  2.5     10-21  Mg     2.1     10-21      Urinalysis Basic - ( 21 Oct 2024 05:00 )    Color: x / Appearance: x / SG: x / pH: x  Gluc: 142 mg/dL / Ketone: x  / Bili: x / Urobili: x   Blood: x / Protein: x / Nitrite: x   Leuk Esterase: x / RBC: x / WBC x   Sq Epi: x / Non Sq Epi: x / Bacteria: x    POCT Blood Glucose.: 127 mg/dL (10-21-24 @ 11:48)  POCT Blood Glucose.: 142 mg/dL (10-20-24 @ 20:49)  POCT Blood Glucose.: 118 mg/dL (10-20-24 @ 17:05)    Thyroid Stimulating Hormone, Serum: 2.19 uIU/mL (10-19-24 @ 04:15)  Triiodothyronine, Total (T3 Total): 98 ng/dL (10-19-24 @ 04:15)

## 2024-10-21 NOTE — PROGRESS NOTE ADULT - ASSESSMENT
60 yo F with hx of TSP for pituitary resection by Dr. Guevara in 2019 presents for blurry vision, she has been having blurry vision for a few weeks. Pt went to [facility] for eye exam due to blurry vision. They sent her for MRI which was preformed at a [facility] which showed increased size of pituitary macroadenoma with mass effect on optic chiasm., came in for admitted under Neurosurgery for further Management, imaging done here showed  Large mass in the sella and suprasellar cistern, with chiasmatic encroachment, over the course she has been complaining of intermittent chest pain, she has this chest pain for 3 weeks, no chest tenderness, no relation with exertion, EKG with no acute changes, trop 1st set done came negative too, TTE pending, medicine consulted for Medical Management.     Plan:     # Blurry Vission/  Large mass in the sella and suprasellar cistern, with chiasmatic encroachment: Likely  # Pituitary Macroadenoma  # Prolactinoma  Management as per Neurosurgery   Neuro checks  Plan for surgery during this admission  Hormone levels to monitor   Endocrine consult   monitor prolactin level as it is elevated.       # Intermittent chest pain:   EKG with no acute changes  trops negative  TTE done and is unremarkable  will continue to monitor  obtain lipid panel in am   Trop in am  PRN pain meds   could not give aspirin given upcoming surgery  would recommend cardiology consult as patient might require stress test before procedure.    # Prediabetes: Counselled for low carb diet.     # HLD: her LDL is 159, would start atorvastatin 20mg daily.     # Leukocytosis, Due to steroids  - monitor    Patient denies Hx of exertional dysnea, no personal or family hx of easy bleeding, Patient's METS Score is >4  and RCRI is 0, patient labs, EKG and Echo reviewed, patient is at intermediate risk for perioperative cardiovascular complication and is optimized from the medicine point of view for planned procedure,

## 2024-10-21 NOTE — PROGRESS NOTE ADULT - SUBJECTIVE AND OBJECTIVE BOX
HOSPITALIST PROGRESS NOTE    FERNANDO DOMINGUEZ  [id number]  59yFemale    Patient is a 59y old  Female who presents with a chief complaint of Pituitary Mass (21 Oct 2024 10:39)      SUBJECTIVE:   Chart reviewed since admission.  Patient seen and examined at bedside for pituitary mass.  Complains  of visual losses more on left side.      OBJECTIVE:  Vital Signs Last 24 Hrs  T(C): 36.8 (21 Oct 2024 12:18), Max: 37.1 (20 Oct 2024 18:00)  T(F): 98.3 (21 Oct 2024 12:18), Max: 98.7 (20 Oct 2024 18:00)  HR: 100 (21 Oct 2024 12:18) (70 - 107)  BP: 150/93 (21 Oct 2024 12:18) (114/70 - 150/93)  BP(mean): 86 (20 Oct 2024 23:00) (86 - 86)  RR: 18 (21 Oct 2024 12:18) (17 - 20)  SpO2: 99% (21 Oct 2024 12:18) (95% - 99%)    Parameters below as of 21 Oct 2024 12:18  Patient On (Oxygen Delivery Method): room air        PHYSICAL EXAMINATION  General: Middle aged female sitting in bed  HEENT:  extraocular movements intact pupils equal, responsive, reactive to light and accomodation   NECK:  Supple  CVS: regular rate and rhythm S1 S2  RESP:  Clear to auscultation bilaterally  GI:  Soft nondistended nontender BS+  : No suprapubic tenderness  MSK:  Full range of movement  CNS:  Awake, alert, oriented. Visual field loss bilateral peripheries. Strength 5/5 bilateral upper and lower extremities, intact sensation  INTEG:  warm dry skin  PSYCH:  Fair mood    MONITOR:  CAPILLARY BLOOD GLUCOSE      POCT Blood Glucose.: 127 mg/dL (21 Oct 2024 11:48)  POCT Blood Glucose.: 142 mg/dL (20 Oct 2024 20:49)  POCT Blood Glucose.: 118 mg/dL (20 Oct 2024 17:05)        I&O's Summary                          13.5   21.38 )-----------( 294      ( 21 Oct 2024 05:00 )             40.5       10-21    140  |  108  |  17.4  ----------------------------<  142[H]  4.5   |  22.0  |  0.93    Ca    9.3      21 Oct 2024 05:00  Phos  2.5     10-21  Mg     2.1     10-21          Urinalysis Basic - ( 21 Oct 2024 05:00 )    Color: x / Appearance: x / SG: x / pH: x  Gluc: 142 mg/dL / Ketone: x  / Bili: x / Urobili: x   Blood: x / Protein: x / Nitrite: x   Leuk Esterase: x / RBC: x / WBC x   Sq Epi: x / Non Sq Epi: x / Bacteria: x            TTE:    RADIOLOGY        MEDICATIONS  (STANDING):  dexAMETHasone  Injectable 4 milliGRAM(s) IV Push every 6 hours  dextrose 5%. 1000 milliLiter(s) (50 mL/Hr) IV Continuous <Continuous>  dextrose 5%. 1000 milliLiter(s) (100 mL/Hr) IV Continuous <Continuous>  dextrose 50% Injectable 12.5 Gram(s) IV Push once  dextrose 50% Injectable 25 Gram(s) IV Push once  dextrose 50% Injectable 25 Gram(s) IV Push once  enoxaparin Injectable 40 milliGRAM(s) SubCutaneous every 24 hours  glucagon  Injectable 1 milliGRAM(s) IntraMuscular once  insulin lispro (ADMELOG) corrective regimen sliding scale   SubCutaneous three times a day before meals  insulin lispro (ADMELOG) corrective regimen sliding scale   SubCutaneous at bedtime  multivitamin 1 Tablet(s) Oral daily  pantoprazole  Injectable 40 milliGRAM(s) IV Push daily  polyethylene glycol 3350 17 Gram(s) Oral daily  rosuvastatin 5 milliGRAM(s) Oral at bedtime  senna 2 Tablet(s) Oral at bedtime  sodium chloride 0.9%. 1000 milliLiter(s) (60 mL/Hr) IV Continuous <Continuous>      MEDICATIONS  (PRN):  acetaminophen     Tablet .. 650 milliGRAM(s) Oral every 6 hours PRN Mild Pain (1 - 3)  dextrose Oral Gel 15 Gram(s) Oral once PRN Blood Glucose LESS THAN 70 milliGRAM(s)/deciliter  ondansetron Injectable 4 milliGRAM(s) IV Push every 6 hours PRN Nausea and/or Vomiting

## 2024-10-21 NOTE — PROGRESS NOTE ADULT - SUBJECTIVE AND OBJECTIVE BOX
HPI: 60 yo F with hx of TSP for pituitary resection by Dr. Guevara in 2019 presents for blurry vision. Pt has been having blurry vision for a few weeks. Pt went to [facility] for [facility] exam due to blurry vision. 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 Pt did not have Dr. Guevara's info so presented to [facility]. Pt transferred to [facility].   Pt denies HA, SOB, palpitations.  (18 Oct 2024 19:16)      INTERVAL HPI/OVERNIGHT EVENTS:  59y Female seen lying comfortably in bed. s/p nuclear stress test & TTE for c/o chest pressure    Vital Signs Last 24 Hrs  T(C): 36.7 (21 Oct 2024 10:27), Max: 37.3 (20 Oct 2024 12:10)  T(F): 98 (21 Oct 2024 10:27), Max: 99.1 (20 Oct 2024 12:10)  HR: 78 (21 Oct 2024 10:27) (70 - 114)  BP: 147/82 (21 Oct 2024 10:27) (114/70 - 150/97)  BP(mean): 86 (20 Oct 2024 23:00) (86 - 86)  RR: 17 (21 Oct 2024 10:27) (17 - 20)  SpO2: 99% (21 Oct 2024 10:27) (95% - 99%)    Parameters below as of 21 Oct 2024 10:27  Patient On (Oxygen Delivery Method): room air        PHYSICAL EXAM:  GENERAL: NAD, well-groomed  HEAD:  Atraumatic, normocephalic  MENTAL STATUS: AAO x3; Awake. Opens eyes spontaneously. Appropriately conversant without aphasia, following simple commands.  CRANIAL NERVES: PERRL. L eye blurry vision, EOMI without nystagmus. Facial sensation intact V1-3 distribution b/l. Face symmetric w/ normal eye closure and smile, tongue midline. Hearing grossly intact. Speech clear.   MOTOR: strength 5/5 b/l upper and lower extremities  SENSATION: grossly intact to light touch all extremities      LABS:                        13.5   21.38 )-----------( 294      ( 21 Oct 2024 05:00 )             40.5     10-21    140  |  108  |  17.4  ----------------------------<  142[H]  4.5   |  22.0  |  0.93    Ca    9.3      21 Oct 2024 05:00  Phos  2.5     10-21  Mg     2.1     10-21        Urinalysis Basic - ( 21 Oct 2024 05:00 )    Color: x / Appearance: x / SG: x / pH: x  Gluc: 142 mg/dL / Ketone: x  / Bili: x / Urobili: x   Blood: x / Protein: x / Nitrite: x   Leuk Esterase: x / RBC: x / WBC x   Sq Epi: x / Non Sq Epi: x / Bacteria: x          RADIOLOGY & ADDITIONAL TESTS:  < from: US Duplex Venous Lower Ext Complete, Bilateral (10.20.24 @ 03:56) >  IMPRESSION:  No evidence of deep venous thrombosis in either lower extremity.      < end of copied text >    < from: MR Brain Stereotactic w/wo IV Cont (10.18.24 @ 23:33) >  IMPRESSION:    1.  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      < end of copied text >        CAPRINI SCORE [CLOT]:  Patient has an estimated Caprini score of greater than 5.  However, the patient's unique clinical situation will be addressed in an individual manner to determine appropriate anticoagulation treatment, if any. HPI: 60 yo F with hx of TSP for pituitary resection by Dr. Guevara in 2019 presents for blurry vision. Pt has been having blurry vision for a few weeks. Pt went to [facility] for eye exam due to blurry vision. 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 Pt did not have Dr. Guevara's info so presented to [facility]. Pt transferred to [facility].   Pt denies HA, SOB, palpitations.  (18 Oct 2024 19:16)      INTERVAL HPI/OVERNIGHT EVENTS:  59y Female seen lying comfortably in bed. s/p nuclear stress test & TTE for c/o chest pressure.      Vital Signs Last 24 Hrs  T(C): 36.7 (21 Oct 2024 10:27), Max: 37.3 (20 Oct 2024 12:10)  T(F): 98 (21 Oct 2024 10:27), Max: 99.1 (20 Oct 2024 12:10)  HR: 78 (21 Oct 2024 10:27) (70 - 114)  BP: 147/82 (21 Oct 2024 10:27) (114/70 - 150/97)  BP(mean): 86 (20 Oct 2024 23:00) (86 - 86)  RR: 17 (21 Oct 2024 10:27) (17 - 20)  SpO2: 99% (21 Oct 2024 10:27) (95% - 99%)    Parameters below as of 21 Oct 2024 10:27  Patient On (Oxygen Delivery Method): room air        PHYSICAL EXAM:  GENERAL: NAD, well-groomed  HEAD:  Atraumatic, normocephalic  MENTAL STATUS: AAO x3; Awake. Opens eyes spontaneously. Appropriately conversant without aphasia, following simple commands.  CRANIAL NERVES: PERRL. L eye blurry vision, EOMI without nystagmus. Facial sensation intact V1-3 distribution b/l. Face symmetric w/ normal eye closure and smile, tongue midline. Hearing grossly intact. Speech clear.   MOTOR: strength 5/5 b/l upper and lower extremities  SENSATION: grossly intact to light touch all extremities      LABS:                        13.5   21.38 )-----------( 294      ( 21 Oct 2024 05:00 )             40.5     10-21    140  |  108  |  17.4  ----------------------------<  142[H]  4.5   |  22.0  |  0.93    Ca    9.3      21 Oct 2024 05:00  Phos  2.5     10-21  Mg     2.1     10-21        Urinalysis Basic - ( 21 Oct 2024 05:00 )    Color: x / Appearance: x / SG: x / pH: x  Gluc: 142 mg/dL / Ketone: x  / Bili: x / Urobili: x   Blood: x / Protein: x / Nitrite: x   Leuk Esterase: x / RBC: x / WBC x   Sq Epi: x / Non Sq Epi: x / Bacteria: x          RADIOLOGY & ADDITIONAL TESTS:  < from: US Duplex Venous Lower Ext Complete, Bilateral (10.20.24 @ 03:56) >  IMPRESSION:  No evidence of deep venous thrombosis in either lower extremity.      < end of copied text >    < from: MR Brain Stereotactic w/wo IV Cont (10.18.24 @ 23:33) >  IMPRESSION:    1.  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      < end of copied text >        CAPRINI SCORE [CLOT]:  Patient has an estimated Caprini score of greater than 5.  However, the patient's unique clinical situation will be addressed in an individual manner to determine appropriate anticoagulation treatment, if any. HPI: 58 yo F with hx of TSP for pituitary resection by Dr. Guevara in 2019 presents for blurry vision. Pt has been having blurry vision for a few weeks. Pt went to [facility] for [facility] exam due to blurry vision. 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 Pt did not have Dr. Guevara's info so presented to [facility]. Pt transferred to [facility].   Pt denies HA, SOB, palpitations.  (18 Oct 2024 19:16)      INTERVAL HPI/OVERNIGHT EVENTS:  59y Female seen lying comfortably in bed. s/p nuclear stress test & TTE for c/o chest pressure.      Vital Signs Last 24 Hrs  T(C): 36.7 (21 Oct 2024 10:27), Max: 37.3 (20 Oct 2024 12:10)  T(F): 98 (21 Oct 2024 10:27), Max: 99.1 (20 Oct 2024 12:10)  HR: 78 (21 Oct 2024 10:27) (70 - 114)  BP: 147/82 (21 Oct 2024 10:27) (114/70 - 150/97)  BP(mean): 86 (20 Oct 2024 23:00) (86 - 86)  RR: 17 (21 Oct 2024 10:27) (17 - 20)  SpO2: 99% (21 Oct 2024 10:27) (95% - 99%)    Parameters below as of 21 Oct 2024 10:27  Patient On (Oxygen Delivery Method): room air        PHYSICAL EXAM:  GENERAL: NAD, well-groomed  HEAD:  Atraumatic, normocephalic  MENTAL STATUS: AAO x3; Awake. Opens eyes spontaneously. Appropriately conversant without aphasia, following simple commands.  CRANIAL NERVES: PERRL. L eye blurry vision/L medial field cut., EOMI without nystagmus. Facial sensation intact V1-3 distribution b/l. Face symmetric w/ normal eye closure and smile, tongue midline. Hearing grossly intact. Speech clear.   MOTOR: strength 5/5 b/l upper and lower extremities  SENSATION: grossly intact to light touch all extremities      LABS:                        13.5   21.38 )-----------( 294      ( 21 Oct 2024 05:00 )             40.5     10-21    140  |  108  |  17.4  ----------------------------<  142[H]  4.5   |  22.0  |  0.93    Ca    9.3      21 Oct 2024 05:00  Phos  2.5     10-21  Mg     2.1     10-21        Urinalysis Basic - ( 21 Oct 2024 05:00 )    Color: x / Appearance: x / SG: x / pH: x  Gluc: 142 mg/dL / Ketone: x  / Bili: x / Urobili: x   Blood: x / Protein: x / Nitrite: x   Leuk Esterase: x / RBC: x / WBC x   Sq Epi: x / Non Sq Epi: x / Bacteria: x          RADIOLOGY & ADDITIONAL TESTS:  < from: US Duplex Venous Lower Ext Complete, Bilateral (10.20.24 @ 03:56) >  IMPRESSION:  No evidence of deep venous thrombosis in either lower extremity.      < end of copied text >    < from: MR Brain Stereotactic w/wo IV Cont (10.18.24 @ 23:33) >  IMPRESSION:    1.  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      < end of copied text >        CAPRINI SCORE [CLOT]:  Patient has an estimated Caprini score of greater than 5.  However, the patient's unique clinical situation will be addressed in an individual manner to determine appropriate anticoagulation treatment, if any.

## 2024-10-21 NOTE — PROGRESS NOTE ADULT - ASSESSMENT
60 yo F with hx of TSP for pituitary resection by Dr. Guevara in 2019 presents for blurry vision. Pt has been having blurry vision for a few weeks. Pt went to [facility] for [facility] exam due to blurry vision. 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 Pt did not have Dr. Guevara's info so presented to [facility]. Pt transferred to [facility].   Pt denies HA, SOB, palpitations.

## 2024-10-21 NOTE — PROGRESS NOTE ADULT - ASSESSMENT
59F with PMHx transphenoidal pituitary resection by Dr. Guevara in 2019 presented with c/o visual disturbances. 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     1. 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 Guevara in 2019 presented with c/o visual disturbances. 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     1. 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

## 2024-10-21 NOTE — PROGRESS NOTE ADULT - NSPROGADDITIONALINFOA_GEN_ALL_CORE
< from: Nuclear Stress Test-Exercise.. (10.21.24 @ 08:10) >    Conclusions:   1. NORMAL STUDY   2. Patient achieved 7.0 METS, which is consistent with average exercise capacity. 103% MPHR.   3. No cardiac symptoms. No ischemicECG changes.   4. High treadmill score = 5 (Low risk)   5. Normal myocardial perfusion scan, with no evidence of infarction or inducible ischemia.   6. The left ventricle is normal in function. The post stress left ventricular EF is 97 %.    < end of copied text >    no further cardiac work up at this time  Thank you for allowing me to participate in care of your patient.   Please call as needed.

## 2024-10-21 NOTE — PROGRESS NOTE ADULT - ASSESSMENT
ASSESSMENT  59F PMH pituitary adenoma s/p resection 2019 by Dr. Guevara presented with new MRI finding of increasing size of known lesion causing visual deficit.      PLAN  - case d/w team  - plan for OR this week for transphenoidal pituitary resection, pending date/time  - con't neuro checks q2  - con't decadron 4q6, PPI, ISS  - pain control as needed, avoid over sedation  - cardiology following, NST/TTE results pending  - normotension  - bowel reg, LBM 10/18  - scds, lovenox for dvt ppx, will need to be held 24 hours prior to surgery  - oob as tolerated

## 2024-10-21 NOTE — PROGRESS NOTE ADULT - SUBJECTIVE AND OBJECTIVE BOX
[facility]                                                         CARDIOLOGY AT [facility]                                                                  [address], [place] [address]                                                         Telephone: [phone]. Fax:[phone]                                                                             PROGRESS NOTE      Reason for follow up: Pre op  Last 24h Telemetry: SR  Overall Plan: NST/TTE today       Review of symptoms:   Cardiac:  No chest pain. No dyspnea. No palpitations.  Respiratory: no cough. No dyspnea  Gastrointestinal: No diarrhea. No abdominal pain. No bleeding.   Neuro: No focal neuro complaints.      Vitals:  T(C): 36.7 (10-21-24 @ 06:55), Max: 37.3 (10-20-24 @ 12:10)  HR: 75 (10-21-24 @ 06:55) (70 - 114)  BP: 126/76 (10-21-24 @ 06:55) (114/70 - 150/97)  RR: 18 (10-21-24 @ 06:55) (18 - 20)  SpO2: 97% (10-21-24 @ 06:55) (95% - 99%)      Weight (kg): 68 (10-18 @ 17:19)      PHYSICAL EXAM:  Appearance: Comfortable. No acute distress  HEENT:  Atraumatic. Normocephalic.  Normal oral mucosa  Neurologic: A & O x 3, no gross focal deficits.  Cardiovascular: RRR S1 S2, No murmur, no rubs/gallops. No JVD  Respiratory: Lungs clear to auscultation, unlabored   Gastrointestinal:  Soft, Non-tender, + BS  Lower Extremities: No edema  Psychiatry: Patient is calm. No agitation.   Skin: warm and dry.      CURRENT CARDIAC MEDICATIONS:        CURRENT OTHER MEDICATIONS:  acetaminophen     Tablet .. 650 milliGRAM(s) Oral every 6 hours PRN Mild Pain (1 - 3)  ondansetron Injectable 4 milliGRAM(s) IV Push every 6 hours PRN Nausea and/or Vomiting  pantoprazole  Injectable 40 milliGRAM(s) IV Push daily  polyethylene glycol 3350 17 Gram(s) Oral daily  senna 2 Tablet(s) Oral at bedtime  dexAMETHasone  Injectable 4 milliGRAM(s) IV Push every 6 hours  enoxaparin Injectable 40 milliGRAM(s) SubCutaneous every 24 hours  multivitamin 1 Tablet(s) Oral daily  sodium chloride 0.9%. 1000 milliLiter(s) (60 mL/Hr) IV Continuous <Continuous>        LABS:	 	                            13.5   21.38 )-----------( 294      ( 21 Oct 2024 05:00 )             40.5     10-21    140  |  108  |  17.4  ----------------------------<  142[H]  4.5   |  22.0  |  0.93    Ca    9.3      21 Oct 2024 05:00  Phos  2.5     10-21  Mg     2.1     10-21      PT/INR/PTT ( 18 Oct 2024 17:47 )                       :                       :      12.2         :       29.9                  .        .                   .              .           .       1.08        .                                       Lipid Profile: Date: 10-20 @ 04:06  Total cholesterol 221; Direct LDL: --; HDL: 43; Triglycerides:95    HgA1c:   TSH: Thyroid Stimulating Hormone, Serum: 2.19 uIU/mL        TELEMETRY: SR      DIAGNOSTIC TESTING:  [ ] Echocardiogram:   < from: TTE W or WO Ultrasound Enhancing Agent (10.19.24 @ 09:38) >  _______________________________________________________________________________________     CONCLUSIONS:      1. Technically difficult image quality.   2. Left ventricular cavity is normal in size. Left ventricular wall thickness is normal. Left ventricular systolic function is normal with an ejection fraction visually estimated at 65 to 70 %. There is poor visualization of the endocardial borders to determine the presence of wall motion abnormalities.   3. Normal left ventricular diastolic function, with normal left ventricular filling pressure.   4. Probably normal right ventricular cavity size and probably normal right ventricular systolic function.   5. Normal left and right atrial size.   6. Trileaflet aortic valve with normal systolic excursion. Fibrocalcific aortic valve sclerosis without stenosis.   7. No pericardial effusion seen.   8. No prior echocardiogram is available for comparison.    ________________________________________________________________________________________    < end of copied text >

## 2024-10-22 LAB
GLUCOSE BLDC GLUCOMTR-MCNC: 121 MG/DL — HIGH (ref 70–99)
GLUCOSE BLDC GLUCOMTR-MCNC: 173 MG/DL — HIGH (ref 70–99)
GLUCOSE BLDC GLUCOMTR-MCNC: 177 MG/DL — HIGH (ref 70–99)
GLUCOSE BLDC GLUCOMTR-MCNC: 89 MG/DL — SIGNIFICANT CHANGE UP (ref 70–99)
INSULIN-LIKE GROWTH FACTOR 1 INTERPRETATION: SIGNIFICANT CHANGE UP
INSULIN-LIKE GROWTH FACTOR 1: 90 NG/ML — SIGNIFICANT CHANGE UP (ref 41–243)
PROLACTIN SERPL-MCNC: 45.4 NG/ML — HIGH (ref 3.4–24.1)
PROLACTIN SERPL-MCNC: 47.1 NG/ML — HIGH (ref 3.4–24.1)
T4 FREE SERPL-MCNC: 0.9 NG/DL — SIGNIFICANT CHANGE UP (ref 0.9–1.8)

## 2024-10-22 PROCEDURE — 99232 SBSQ HOSP IP/OBS MODERATE 35: CPT

## 2024-10-22 RX ORDER — POVIDONE-IODINE 0.07 MG/ML
1 SOLUTION TOPICAL ONCE
Refills: 0 | Status: COMPLETED | OUTPATIENT
Start: 2024-10-23 | End: 2024-10-23

## 2024-10-22 RX ADMIN — DEXAMETHASONE 1.5 MG 4 MILLIGRAM(S): 1.5 TABLET ORAL at 05:49

## 2024-10-22 RX ADMIN — Medication 2: at 17:27

## 2024-10-22 RX ADMIN — DEXAMETHASONE 1.5 MG 4 MILLIGRAM(S): 1.5 TABLET ORAL at 11:22

## 2024-10-22 RX ADMIN — SODIUM CHLORIDE 60 MILLILITER(S): 9 INJECTION, SOLUTION INTRAMUSCULAR; INTRAVENOUS; SUBCUTANEOUS at 17:27

## 2024-10-22 RX ADMIN — Medication 2 TABLET(S): at 20:58

## 2024-10-22 RX ADMIN — DEXAMETHASONE 1.5 MG 4 MILLIGRAM(S): 1.5 TABLET ORAL at 17:27

## 2024-10-22 RX ADMIN — Medication 5 MILLIGRAM(S): at 20:58

## 2024-10-22 RX ADMIN — PANTOPRAZOLE SODIUM 40 MILLIGRAM(S): 40 TABLET, DELAYED RELEASE ORAL at 11:22

## 2024-10-22 RX ADMIN — POLYETHYLENE GLYCOL 3350 17 GRAM(S): 17 POWDER, FOR SOLUTION ORAL at 11:22

## 2024-10-22 RX ADMIN — DEXAMETHASONE 1.5 MG 4 MILLIGRAM(S): 1.5 TABLET ORAL at 23:28

## 2024-10-22 RX ADMIN — Medication 1 TABLET(S): at 11:22

## 2024-10-22 NOTE — PROGRESS NOTE ADULT - ASSESSMENT
60 yo F with hx of TSP for pituitary resection by Dr. Guevara in 2019 presents for blurry vision, she has been having blurry vision for a few weeks. Pt went to [facility] for eye exam due to blurry vision. 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       # Blurry Vission/  Large mass in the sella and suprasellar cistern, with chiasmatic encroachment: Likely  # Pituitary Macroadenoma  Management as per Neurosurgery   Neuro checks  Plan for surgery during this admission  Hormone levels to monitor   Endocrine appreciated  monitor prolactin level      # Intermittent chest pain:   EKG with no acute changes  trops negative  TTE done and is unremarkable  will continue to monitor  obtain lipid panel in am   Trop in am  PRN pain meds   could not give aspirin given upcoming surgery  would recommend cardiology consult as patient might require stress test before procedure.    # Prediabetes: Counselled for low carb diet.     # HLD: her LDL is 159, would start atorvastatin 20mg daily.     # Leukocytosis, Due to steroids  - monitor    Patient denies Hx of exertional dyspnea no personal or family hx of easy bleeding, Patient's METS Score is >4  and RCRI is 0, patient labs, EKG and Echo reviewed, patient is at intermediate risk for perioperative cardiovascular complication and is optimized from the medicine point of view for planned procedure,

## 2024-10-22 NOTE — PROGRESS NOTE ADULT - SUBJECTIVE AND OBJECTIVE BOX
INTERVAL EVENTS:  Follow up pituitary adenoma. Reports no acute pain, vision remains unchanged.     MEDICATIONS  (STANDING):  dexAMETHasone  Injectable 4 milliGRAM(s) IV Push every 6 hours  dextrose 5%. 1000 milliLiter(s) (100 mL/Hr) IV Continuous <Continuous>  dextrose 5%. 1000 milliLiter(s) (50 mL/Hr) IV Continuous <Continuous>  dextrose 50% Injectable 25 Gram(s) IV Push once  dextrose 50% Injectable 12.5 Gram(s) IV Push once  dextrose 50% Injectable 25 Gram(s) IV Push once  glucagon  Injectable 1 milliGRAM(s) IntraMuscular once  insulin lispro (ADMELOG) corrective regimen sliding scale   SubCutaneous three times a day before meals  insulin lispro (ADMELOG) corrective regimen sliding scale   SubCutaneous at bedtime  multivitamin 1 Tablet(s) Oral daily  pantoprazole  Injectable 40 milliGRAM(s) IV Push daily  polyethylene glycol 3350 17 Gram(s) Oral daily  rosuvastatin 5 milliGRAM(s) Oral at bedtime  senna 2 Tablet(s) Oral at bedtime  sodium chloride 0.9%. 1000 milliLiter(s) (60 mL/Hr) IV Continuous <Continuous>    MEDICATIONS  (PRN):  acetaminophen     Tablet .. 650 milliGRAM(s) Oral every 6 hours PRN Mild Pain (1 - 3)  dextrose Oral Gel 15 Gram(s) Oral once PRN Blood Glucose LESS THAN 70 milliGRAM(s)/deciliter  ondansetron Injectable 4 milliGRAM(s) IV Push every 6 hours PRN Nausea and/or Vomiting    Allergies  No Known Allergies    Vital Signs Last 24 Hrs  T(C): 37.2 (22 Oct 2024 12:00), Max: 37.2 (22 Oct 2024 12:00)  T(F): 98.9 (22 Oct 2024 12:00), Max: 98.9 (22 Oct 2024 12:00)  HR: 82 (22 Oct 2024 12:00) (63 - 96)  BP: 158/94 (22 Oct 2024 12:00) (105/69 - 160/94)  BP(mean): 113 (22 Oct 2024 12:00) (82 - 115)  RR: 19 (22 Oct 2024 12:00) (15 - 19)  SpO2: 100% (22 Oct 2024 12:00) (97% - 100%)    Parameters below as of 22 Oct 2024 12:00  Patient On (Oxygen Delivery Method): room air    PHYSICAL EXAM:  General: No apparent distress  Respiratory: Lungs clear bilaterally  Cardiac: +S1, S2, no m/r/g  GI: +BS, soft, non tender, non distended  Extremities: No peripheral edema  Neuro: A+O X3    LABS:                        13.5   21.38 )-----------( 294      ( 21 Oct 2024 05:00 )             40.5     10-21    140  |  108  |  17.4  ----------------------------<  142[H]  4.5   |  22.0  |  0.93    Ca    9.3      21 Oct 2024 05:00  Phos  2.5     10-21  Mg     2.1     10-21      Urinalysis Basic - ( 21 Oct 2024 05:00 )    Color: x / Appearance: x / SG: x / pH: x  Gluc: 142 mg/dL / Ketone: x  / Bili: x / Urobili: x   Blood: x / Protein: x / Nitrite: x   Leuk Esterase: x / RBC: x / WBC x   Sq Epi: x / Non Sq Epi: x / Bacteria: x    POCT Blood Glucose.: 89 mg/dL (10-22-24 @ 13:27)  POCT Blood Glucose.: 121 mg/dL (10-22-24 @ 09:25)  POCT Blood Glucose.: 106 mg/dL (10-21-24 @ 21:54)  POCT Blood Glucose.: 95 mg/dL (10-21-24 @ 16:56)    Free Thyroxine, Serum: 0.9 ng/dL (10-22-24 @ 04:52)  Thyroid Stimulating Hormone, Serum: 2.19 uIU/mL (10-19-24 @ 04:15)  Triiodothyronine, Total (T3 Total): 98 ng/dL (10-19-24 @ 04:15)

## 2024-10-22 NOTE — PROGRESS NOTE ADULT - ASSESSMENT
59F with PMHx transphenoidal pituitary resection by Dr. Guevara in 2019 presented with c/o visual disturbances. 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     1. 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 Will trend TFTs  - On decadron preoperatively  - Surgical plan as per neurosurgery

## 2024-10-22 NOTE — PROGRESS NOTE ADULT - SUBJECTIVE AND OBJECTIVE BOX
HPI:  60 yo F with hx of TSP for pituitary resection by Dr. Guevara in 2019 presents for blurry vision. Pt has been having blurry vision for a few weeks. Pt went to sight [facility] for eye exam due to blurry vision. They sent her for MRI which was preformed at a [facility] which showed increased size of pituitary macroadenoma with mass effect on optic chiasm. Pt did not have Dr. Guevara's info so presented to [facility]. Pt transferred to [facility].   Pt denies HA, SOB, palpitations.  (18 Oct 2024 19:16)    INTERVAL HPI/OVERNIGHT EVENTS:  59y Female seen on rounds w/Dr. Guevara this AM. No acute complaints. Pt informed of surgery time for tomorrow.     Vital Signs Last 24 Hrs  T(C): 36.6 (22 Oct 2024 08:00), Max: 36.8 (21 Oct 2024 12:18)  T(F): 97.8 (22 Oct 2024 08:00), Max: 98.3 (21 Oct 2024 12:18)  HR: 93 (22 Oct 2024 10:00) (63 - 100)  BP: 136/104 (22 Oct 2024 10:00) (105/69 - 160/94)  BP(mean): 115 (22 Oct 2024 10:00) (82 - 115)  RR: 19 (22 Oct 2024 10:00) (15 - 19)  SpO2: 100% (22 Oct 2024 10:00) (97% - 100%)    Parameters below as of 22 Oct 2024 10:00  Patient On (Oxygen Delivery Method): room air    PHYSICAL EXAM:  GENERAL: NAD  HEAD: Atraumatic, normocephalic  JOSE COMA SCORE: 15  MENTAL STATUS: AAO x3; Awake; Opens eyes spontaneously; Appropriately conversant without aphasia; following simple commands, possible L superior temporal vision loss  CRANIAL NERVES: PERRL. EOMI without nystagmus. Facial sensation intact V1-3 distribution b/l. Face symmetric w/ normal eye closure and smile, tongue midline. Hearing grossly intact. Speech clear. Head turning and shoulder shrug intact.   MOTOR: strength 5/5 b/l upper and lower extremities  SENSATION: grossly intact to light touch all extremities  COORDINATION: No drift present    LABS:                        13.5   21.38 )-----------( 294      ( 21 Oct 2024 05:00 )             40.5     10    140  |  108  |  17.4  ----------------------------<  142[H]  4.5   |  22.0  |  0.93    Ca    9.3      21 Oct 2024 05:00  Phos  2.5     10-21  Mg     2.1     10-21      Urinalysis Basic - ( 21 Oct 2024 05:00 )    Color: x / Appearance: x / SG: x / pH: x  Gluc: 142 mg/dL / Ketone: x  / Bili: x / Urobili: x   Blood: x / Protein: x / Nitrite: x   Leuk Esterase: x / RBC: x / WBC x   Sq Epi: x / Non Sq Epi: x / Bacteria: x      10-21 @ 07:01  -  10-22 @ 07:00  --------------------------------------------------------  IN: 640 mL / OUT: 0 mL / NET: 640 mL        RADIOLOGY & ADDITIONAL TESTS:  < from: MR Brain Stereotactic w/wo IV Cont (10.18.24 @ 23:33) >  IMPRESSION:    1.  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      Patient Name: FERNANDO DOMINGUEZ  MRN: [id number], : 61    --- End of Report ---    HAILEY CHANG MD; Attending Radiologist  This document has been electronically signed. Oct 19 2024 12:41AM    < end of copied text >

## 2024-10-22 NOTE — PROGRESS NOTE ADULT - SUBJECTIVE AND OBJECTIVE BOX
HOSPITALIST PROGRESS NOTE    FERNANDO DOMINGUEZ  [id number]  59yFemale    Patient is a 59y old  Female who presents with a chief complaint of Pituitary Mass (22 Oct 2024 10:10)      SUBJECTIVE:   Chart reviewed since last visit.   Patient seen and examined at bedside for pituitary mass  Headache, mild and blurriness,   No weakness or numbness      OBJECTIVE:  Vital Signs Last 24 Hrs  T(C): 36.6 (22 Oct 2024 08:00), Max: 36.8 (21 Oct 2024 12:18)  T(F): 97.8 (22 Oct 2024 08:00), Max: 98.3 (21 Oct 2024 12:18)  HR: 93 (22 Oct 2024 10:00) (63 - 100)  BP: 136/104 (22 Oct 2024 10:00) (105/69 - 160/94)  BP(mean): 115 (22 Oct 2024 10:00) (82 - 115)  RR: 19 (22 Oct 2024 10:00) (15 - 19)  SpO2: 100% (22 Oct 2024 10:00) (97% - 100%)    Parameters below as of 22 Oct 2024 10:00  Patient On (Oxygen Delivery Method): room air        PHYSICAL EXAMINATION  General: Middle aged female sitting in bed  HEENT:  extraocular movements intact pupils equal, responsive, reactive to light and accomodation   NECK:  Supple  CVS: regular rate and rhythm S1 S2  RESP:  Clear to auscultation bilaterally  GI:  Soft nondistended nontender BS+  : No suprapubic tenderness  MSK:  Full range of movement  CNS:  Awake, alert, oriented. Visual field loss bilateral peripheries. Strength 5/5 bilateral upper and lower extremities, intact sensation  INTEG:  warm dry skin  PSYCH:  Fair mood      MONITOR:  CAPILLARY BLOOD GLUCOSE      POCT Blood Glucose.: 121 mg/dL (22 Oct 2024 09:25)  POCT Blood Glucose.: 106 mg/dL (21 Oct 2024 21:54)  POCT Blood Glucose.: 95 mg/dL (21 Oct 2024 16:56)        I&O's Summary    21 Oct 2024 07:01  -  22 Oct 2024 07:00  --------------------------------------------------------  IN: 640 mL / OUT: 0 mL / NET: 640 mL                            13.5   21.38 )-----------( 294      ( 21 Oct 2024 05:00 )             40.5       10-21    140  |  108  |  17.4  ----------------------------<  142[H]  4.5   |  22.0  |  0.93    Ca    9.3      21 Oct 2024 05:00  Phos  2.5     10-21  Mg     2.1     10-21          Urinalysis Basic - ( 21 Oct 2024 05:00 )    Color: x / Appearance: x / SG: x / pH: x  Gluc: 142 mg/dL / Ketone: x  / Bili: x / Urobili: x   Blood: x / Protein: x / Nitrite: x   Leuk Esterase: x / RBC: x / WBC x   Sq Epi: x / Non Sq Epi: x / Bacteria: x            TTE:    RADIOLOGY        MEDICATIONS  (STANDING):  dexAMETHasone  Injectable 4 milliGRAM(s) IV Push every 6 hours  dextrose 5%. 1000 milliLiter(s) (50 mL/Hr) IV Continuous <Continuous>  dextrose 5%. 1000 milliLiter(s) (100 mL/Hr) IV Continuous <Continuous>  dextrose 50% Injectable 25 Gram(s) IV Push once  dextrose 50% Injectable 12.5 Gram(s) IV Push once  dextrose 50% Injectable 25 Gram(s) IV Push once  glucagon  Injectable 1 milliGRAM(s) IntraMuscular once  insulin lispro (ADMELOG) corrective regimen sliding scale   SubCutaneous three times a day before meals  insulin lispro (ADMELOG) corrective regimen sliding scale   SubCutaneous at bedtime  multivitamin 1 Tablet(s) Oral daily  pantoprazole  Injectable 40 milliGRAM(s) IV Push daily  polyethylene glycol 3350 17 Gram(s) Oral daily  rosuvastatin 5 milliGRAM(s) Oral at bedtime  senna 2 Tablet(s) Oral at bedtime  sodium chloride 0.9%. 1000 milliLiter(s) (60 mL/Hr) IV Continuous <Continuous>      MEDICATIONS  (PRN):  acetaminophen     Tablet .. 650 milliGRAM(s) Oral every 6 hours PRN Mild Pain (1 - 3)  dextrose Oral Gel 15 Gram(s) Oral once PRN Blood Glucose LESS THAN 70 milliGRAM(s)/deciliter  ondansetron Injectable 4 milliGRAM(s) IV Push every 6 hours PRN Nausea and/or Vomiting

## 2024-10-22 NOTE — PROGRESS NOTE ADULT - ASSESSMENT
59F [facility] pituitary adenoma s/p resection 2019 by Dr. Guevara presented with new MRI finding of increasing size of known lesion causing visual deficit.    PLAN  - Case d/w Dr. Guevara  - plan for OR this week for transphenoidal pituitary resection, tomorrow 10/22 at 12:30  - con't neuro checks q2  - con't decadron 4q6, PPI, ISS  - pain control as needed, avoid over sedation  - cardiology following, patient optimized for OR  - medically cleared for OR as well  - normotension  - bowel reg, LBM 10/18  - scds, lovenox for dvt ppx, will need to be held 24 hours prior to surgery  - oob as tolerated  - will repeat labs in AM, obtain T&S for OR  - NPO after midnight

## 2024-10-23 LAB
ANION GAP SERPL CALC-SCNC: 13 MMOL/L — SIGNIFICANT CHANGE UP (ref 5–17)
ANION GAP SERPL CALC-SCNC: 13 MMOL/L — SIGNIFICANT CHANGE UP (ref 5–17)
APPEARANCE UR: CLEAR — SIGNIFICANT CHANGE UP
BACTERIA # UR AUTO: NEGATIVE /HPF — SIGNIFICANT CHANGE UP
BILIRUB UR-MCNC: NEGATIVE — SIGNIFICANT CHANGE UP
BLD GP AB SCN SERPL QL: SIGNIFICANT CHANGE UP
BUN SERPL-MCNC: 14.4 MG/DL — SIGNIFICANT CHANGE UP (ref 8–20)
BUN SERPL-MCNC: 18.6 MG/DL — SIGNIFICANT CHANGE UP (ref 8–20)
CALCIUM SERPL-MCNC: 8.5 MG/DL — SIGNIFICANT CHANGE UP (ref 8.4–10.5)
CALCIUM SERPL-MCNC: 8.8 MG/DL — SIGNIFICANT CHANGE UP (ref 8.4–10.5)
CAST: 1 /LPF — SIGNIFICANT CHANGE UP (ref 0–4)
CHLORIDE SERPL-SCNC: 103 MMOL/L — SIGNIFICANT CHANGE UP (ref 96–108)
CHLORIDE SERPL-SCNC: 103 MMOL/L — SIGNIFICANT CHANGE UP (ref 96–108)
CO2 SERPL-SCNC: 20 MMOL/L — LOW (ref 22–29)
CO2 SERPL-SCNC: 21 MMOL/L — LOW (ref 22–29)
COLOR SPEC: YELLOW — SIGNIFICANT CHANGE UP
CREAT SERPL-MCNC: 0.71 MG/DL — SIGNIFICANT CHANGE UP (ref 0.5–1.3)
CREAT SERPL-MCNC: 0.9 MG/DL — SIGNIFICANT CHANGE UP (ref 0.5–1.3)
DIFF PNL FLD: ABNORMAL
EGFR: 73 ML/MIN/1.73M2 — SIGNIFICANT CHANGE UP
EGFR: 97 ML/MIN/1.73M2 — SIGNIFICANT CHANGE UP
GLUCOSE BLDC GLUCOMTR-MCNC: 111 MG/DL — HIGH (ref 70–99)
GLUCOSE BLDC GLUCOMTR-MCNC: 114 MG/DL — HIGH (ref 70–99)
GLUCOSE BLDC GLUCOMTR-MCNC: 130 MG/DL — HIGH (ref 70–99)
GLUCOSE BLDC GLUCOMTR-MCNC: 140 MG/DL — HIGH (ref 70–99)
GLUCOSE BLDC GLUCOMTR-MCNC: 150 MG/DL — HIGH (ref 70–99)
GLUCOSE SERPL-MCNC: 119 MG/DL — HIGH (ref 70–99)
GLUCOSE SERPL-MCNC: 153 MG/DL — HIGH (ref 70–99)
GLUCOSE UR QL: NEGATIVE MG/DL — SIGNIFICANT CHANGE UP
HCT VFR BLD CALC: 41.4 % — SIGNIFICANT CHANGE UP (ref 34.5–45)
HCT VFR BLD CALC: 42.4 % — SIGNIFICANT CHANGE UP (ref 34.5–45)
HGB BLD-MCNC: 13.9 G/DL — SIGNIFICANT CHANGE UP (ref 11.5–15.5)
HGB BLD-MCNC: 14.2 G/DL — SIGNIFICANT CHANGE UP (ref 11.5–15.5)
KETONES UR-MCNC: NEGATIVE MG/DL — SIGNIFICANT CHANGE UP
LEUKOCYTE ESTERASE UR-ACNC: NEGATIVE — SIGNIFICANT CHANGE UP
MAGNESIUM SERPL-MCNC: 2.1 MG/DL — SIGNIFICANT CHANGE UP (ref 1.6–2.6)
MCHC RBC-ENTMCNC: 29.8 PG — SIGNIFICANT CHANGE UP (ref 27–34)
MCHC RBC-ENTMCNC: 29.9 PG — SIGNIFICANT CHANGE UP (ref 27–34)
MCHC RBC-ENTMCNC: 32.8 GM/DL — SIGNIFICANT CHANGE UP (ref 32–36)
MCHC RBC-ENTMCNC: 34.3 GM/DL — SIGNIFICANT CHANGE UP (ref 32–36)
MCV RBC AUTO: 87.2 FL — SIGNIFICANT CHANGE UP (ref 80–100)
MCV RBC AUTO: 91 FL — SIGNIFICANT CHANGE UP (ref 80–100)
NITRITE UR-MCNC: NEGATIVE — SIGNIFICANT CHANGE UP
PH UR: 6.5 — SIGNIFICANT CHANGE UP (ref 5–8)
PHOSPHATE SERPL-MCNC: 3.4 MG/DL — SIGNIFICANT CHANGE UP (ref 2.4–4.7)
PLATELET # BLD AUTO: 206 K/UL — SIGNIFICANT CHANGE UP (ref 150–400)
PLATELET # BLD AUTO: 211 K/UL — SIGNIFICANT CHANGE UP (ref 150–400)
POTASSIUM SERPL-MCNC: 3.8 MMOL/L — SIGNIFICANT CHANGE UP (ref 3.5–5.3)
POTASSIUM SERPL-MCNC: 4.5 MMOL/L — SIGNIFICANT CHANGE UP (ref 3.5–5.3)
POTASSIUM SERPL-SCNC: 3.8 MMOL/L — SIGNIFICANT CHANGE UP (ref 3.5–5.3)
POTASSIUM SERPL-SCNC: 4.5 MMOL/L — SIGNIFICANT CHANGE UP (ref 3.5–5.3)
PROT UR-MCNC: NEGATIVE MG/DL — SIGNIFICANT CHANGE UP
RBC # BLD: 4.66 M/UL — SIGNIFICANT CHANGE UP (ref 3.8–5.2)
RBC # BLD: 4.75 M/UL — SIGNIFICANT CHANGE UP (ref 3.8–5.2)
RBC # FLD: 13.5 % — SIGNIFICANT CHANGE UP (ref 10.3–14.5)
RBC # FLD: 13.7 % — SIGNIFICANT CHANGE UP (ref 10.3–14.5)
RBC CASTS # UR COMP ASSIST: 5 /HPF — HIGH (ref 0–4)
SODIUM SERPL-SCNC: 136 MMOL/L — SIGNIFICANT CHANGE UP (ref 135–145)
SODIUM SERPL-SCNC: 137 MMOL/L — SIGNIFICANT CHANGE UP (ref 135–145)
SP GR SPEC: 1.01 — SIGNIFICANT CHANGE UP (ref 1–1.03)
SQUAMOUS # UR AUTO: 0 /HPF — SIGNIFICANT CHANGE UP (ref 0–5)
UROBILINOGEN FLD QL: 0.2 MG/DL — SIGNIFICANT CHANGE UP (ref 0.2–1)
WBC # BLD: 16.33 K/UL — HIGH (ref 3.8–10.5)
WBC # BLD: 17.45 K/UL — HIGH (ref 3.8–10.5)
WBC # FLD AUTO: 16.33 K/UL — HIGH (ref 3.8–10.5)
WBC # FLD AUTO: 17.45 K/UL — HIGH (ref 3.8–10.5)
WBC UR QL: 1 /HPF — SIGNIFICANT CHANGE UP (ref 0–5)

## 2024-10-23 PROCEDURE — 61781 SCAN PROC CRANIAL INTRA: CPT | Mod: AS

## 2024-10-23 PROCEDURE — 88331 PATH CONSLTJ SURG 1 BLK 1SPC: CPT | Mod: 26

## 2024-10-23 PROCEDURE — 88341 IMHCHEM/IMCYTCHM EA ADD ANTB: CPT | Mod: 26

## 2024-10-23 PROCEDURE — 61548 REMOVAL OF PITUITARY GLAND: CPT | Mod: AS

## 2024-10-23 PROCEDURE — 88342 IMHCHEM/IMCYTCHM 1ST ANTB: CPT | Mod: 26

## 2024-10-23 PROCEDURE — 88305 TISSUE EXAM BY PATHOLOGIST: CPT | Mod: 26

## 2024-10-23 PROCEDURE — 99291 CRITICAL CARE FIRST HOUR: CPT

## 2024-10-23 PROCEDURE — 99232 SBSQ HOSP IP/OBS MODERATE 35: CPT

## 2024-10-23 DEVICE — DURASEAL SEALANT 5ML: Type: IMPLANTABLE DEVICE | Status: FUNCTIONAL

## 2024-10-23 DEVICE — SURGIFLO MATRIX WITH THROMBIN KIT: Type: IMPLANTABLE DEVICE | Status: FUNCTIONAL

## 2024-10-23 DEVICE — KIT A-LINE 1LUM 20G X 12CM SAFE KIT: Type: IMPLANTABLE DEVICE | Status: FUNCTIONAL

## 2024-10-23 DEVICE — SURGIFOAM PAD 8CM X 12.5CM X 10MM (100): Type: IMPLANTABLE DEVICE | Status: FUNCTIONAL

## 2024-10-23 RX ORDER — OXYCODONE HYDROCHLORIDE 30 MG/1
10 TABLET ORAL EVERY 4 HOURS
Refills: 0 | Status: DISCONTINUED | OUTPATIENT
Start: 2024-10-23 | End: 2024-10-28

## 2024-10-23 RX ORDER — OXYCODONE HYDROCHLORIDE 30 MG/1
5 TABLET ORAL EVERY 4 HOURS
Refills: 0 | Status: DISCONTINUED | OUTPATIENT
Start: 2024-10-23 | End: 2024-10-28

## 2024-10-23 RX ORDER — CEFAZOLIN SODIUM 1 G
2000 VIAL (EA) INJECTION EVERY 8 HOURS
Refills: 0 | Status: COMPLETED | OUTPATIENT
Start: 2024-10-23 | End: 2024-10-24

## 2024-10-23 RX ORDER — HYDRALAZINE HYDROCHLORIDE 50 MG/1
10 TABLET, FILM COATED ORAL
Refills: 0 | Status: DISCONTINUED | OUTPATIENT
Start: 2024-10-23 | End: 2024-10-28

## 2024-10-23 RX ORDER — HYDROCORTISONE 10 MG/1
25 TABLET ORAL EVERY 12 HOURS
Refills: 0 | Status: DISCONTINUED | OUTPATIENT
Start: 2024-10-24 | End: 2024-10-24

## 2024-10-23 RX ORDER — HYDROMORPHONE HCL/0.9% NACL/PF 6 MG/30 ML
0.5 PATIENT CONTROLLED ANALGESIA SYRINGE INTRAVENOUS EVERY 6 HOURS
Refills: 0 | Status: DISCONTINUED | OUTPATIENT
Start: 2024-10-23 | End: 2024-10-28

## 2024-10-23 RX ORDER — SODIUM CHLORIDE 9 MG/ML
1000 INJECTION, SOLUTION INTRAMUSCULAR; INTRAVENOUS; SUBCUTANEOUS
Refills: 0 | Status: DISCONTINUED | OUTPATIENT
Start: 2024-10-23 | End: 2024-10-27

## 2024-10-23 RX ORDER — SENNA 187 MG
2 TABLET ORAL AT BEDTIME
Refills: 0 | Status: DISCONTINUED | OUTPATIENT
Start: 2024-10-23 | End: 2024-10-28

## 2024-10-23 RX ORDER — METOPROLOL TARTRATE 50 MG
25 TABLET ORAL
Refills: 0 | Status: DISCONTINUED | OUTPATIENT
Start: 2024-10-23 | End: 2024-10-23

## 2024-10-23 RX ORDER — ACETAMINOPHEN 500 MG
1000 TABLET ORAL ONCE
Refills: 0 | Status: COMPLETED | OUTPATIENT
Start: 2024-10-23 | End: 2024-10-23

## 2024-10-23 RX ORDER — FENTANYL CITRAT/DEXTROSE 5%/PF 1250MCG/50
50 PATIENT CONTROLLED ANALGESIA SYRINGE INTRAVENOUS
Refills: 0 | Status: DISCONTINUED | OUTPATIENT
Start: 2024-10-23 | End: 2024-10-23

## 2024-10-23 RX ORDER — HYDROCORTISONE 10 MG/1
50 TABLET ORAL EVERY 12 HOURS
Refills: 0 | Status: COMPLETED | OUTPATIENT
Start: 2024-10-23 | End: 2024-10-24

## 2024-10-23 RX ORDER — PANTOPRAZOLE SODIUM 40 MG/1
40 TABLET, DELAYED RELEASE ORAL
Refills: 0 | Status: DISCONTINUED | OUTPATIENT
Start: 2024-10-23 | End: 2024-10-28

## 2024-10-23 RX ORDER — LABETALOL HCL 200 MG
10 TABLET ORAL
Refills: 0 | Status: DISCONTINUED | OUTPATIENT
Start: 2024-10-23 | End: 2024-10-28

## 2024-10-23 RX ORDER — POLYETHYLENE GLYCOL 3350 17 G/17G
17 POWDER, FOR SOLUTION ORAL DAILY
Refills: 0 | Status: DISCONTINUED | OUTPATIENT
Start: 2024-10-23 | End: 2024-10-28

## 2024-10-23 RX ORDER — ONDANSETRON HYDROCHLORIDE 2 MG/ML
4 INJECTION, SOLUTION INTRAMUSCULAR; INTRAVENOUS EVERY 6 HOURS
Refills: 0 | Status: DISCONTINUED | OUTPATIENT
Start: 2024-10-23 | End: 2024-10-28

## 2024-10-23 RX ORDER — METOPROLOL TARTRATE 50 MG
5 TABLET ORAL ONCE
Refills: 0 | Status: DISCONTINUED | OUTPATIENT
Start: 2024-10-23 | End: 2024-10-23

## 2024-10-23 RX ORDER — HYDRALAZINE HYDROCHLORIDE 50 MG/1
10 TABLET, FILM COATED ORAL
Refills: 0 | Status: DISCONTINUED | OUTPATIENT
Start: 2024-10-23 | End: 2024-10-23

## 2024-10-23 RX ORDER — CEFAZOLIN SODIUM 1 G
2000 VIAL (EA) INJECTION EVERY 8 HOURS
Refills: 0 | Status: DISCONTINUED | OUTPATIENT
Start: 2024-10-23 | End: 2024-10-23

## 2024-10-23 RX ORDER — ONDANSETRON HYDROCHLORIDE 2 MG/ML
4 INJECTION, SOLUTION INTRAMUSCULAR; INTRAVENOUS ONCE
Refills: 0 | Status: DISCONTINUED | OUTPATIENT
Start: 2024-10-23 | End: 2024-10-28

## 2024-10-23 RX ORDER — ACETAMINOPHEN 500 MG
650 TABLET ORAL EVERY 6 HOURS
Refills: 0 | Status: DISCONTINUED | OUTPATIENT
Start: 2024-10-23 | End: 2024-10-28

## 2024-10-23 RX ORDER — SODIUM CHLORIDE 9 MG/ML
500 INJECTION, SOLUTION INTRAMUSCULAR; INTRAVENOUS; SUBCUTANEOUS ONCE
Refills: 0 | Status: COMPLETED | OUTPATIENT
Start: 2024-10-23 | End: 2024-10-23

## 2024-10-23 RX ORDER — HYDROMORPHONE HCL/0.9% NACL/PF 6 MG/30 ML
0.5 PATIENT CONTROLLED ANALGESIA SYRINGE INTRAVENOUS
Refills: 0 | Status: DISCONTINUED | OUTPATIENT
Start: 2024-10-23 | End: 2024-10-23

## 2024-10-23 RX ADMIN — Medication 0.5 MILLIGRAM(S): at 17:40

## 2024-10-23 RX ADMIN — Medication 5 MILLIGRAM(S): at 11:15

## 2024-10-23 RX ADMIN — SODIUM CHLORIDE 70 MILLILITER(S): 9 INJECTION, SOLUTION INTRAMUSCULAR; INTRAVENOUS; SUBCUTANEOUS at 22:35

## 2024-10-23 RX ADMIN — HYDRALAZINE HYDROCHLORIDE 10 MILLIGRAM(S): 50 TABLET, FILM COATED ORAL at 08:20

## 2024-10-23 RX ADMIN — DEXAMETHASONE 1.5 MG 4 MILLIGRAM(S): 1.5 TABLET ORAL at 05:55

## 2024-10-23 RX ADMIN — Medication 0.5 MILLIGRAM(S): at 18:06

## 2024-10-23 RX ADMIN — Medication 2000 MILLIGRAM(S): at 22:35

## 2024-10-23 RX ADMIN — Medication 1000 MILLIGRAM(S): at 18:55

## 2024-10-23 RX ADMIN — POVIDONE-IODINE 1 APPLICATION(S): 0.07 SOLUTION TOPICAL at 06:41

## 2024-10-23 RX ADMIN — HYDRALAZINE HYDROCHLORIDE 10 MILLIGRAM(S): 50 TABLET, FILM COATED ORAL at 18:22

## 2024-10-23 RX ADMIN — DEXAMETHASONE 1.5 MG 4 MILLIGRAM(S): 1.5 TABLET ORAL at 11:14

## 2024-10-23 RX ADMIN — PANTOPRAZOLE SODIUM 40 MILLIGRAM(S): 40 TABLET, DELAYED RELEASE ORAL at 11:15

## 2024-10-23 RX ADMIN — Medication 0.5 MILLIGRAM(S): at 18:15

## 2024-10-23 RX ADMIN — SODIUM CHLORIDE 60 MILLILITER(S): 9 INJECTION, SOLUTION INTRAMUSCULAR; INTRAVENOUS; SUBCUTANEOUS at 09:06

## 2024-10-23 RX ADMIN — Medication 50 MICROGRAM(S): at 17:40

## 2024-10-23 RX ADMIN — SODIUM CHLORIDE 500 MILLILITER(S): 9 INJECTION, SOLUTION INTRAMUSCULAR; INTRAVENOUS; SUBCUTANEOUS at 09:07

## 2024-10-23 RX ADMIN — Medication 400 MILLIGRAM(S): at 18:35

## 2024-10-23 RX ADMIN — Medication 0.5 MILLIGRAM(S): at 22:36

## 2024-10-23 RX ADMIN — Medication 0.5 MILLIGRAM(S): at 23:49

## 2024-10-23 RX ADMIN — Medication 50 MICROGRAM(S): at 17:20

## 2024-10-23 NOTE — DIETITIAN INITIAL EVALUATION ADULT - NS FNS DIET ORDER
Diet, NPO after Midnight:      NPO Start Date: 22-Oct-2024,   NPO Start Time: 23:59 (10-22-24 @ 10:10)  Diet, Regular (10-18-24 @ 19:35)

## 2024-10-23 NOTE — BRIEF OPERATIVE NOTE - OPERATION/FINDINGS
Transphenoidal resection of pituitary tumor. Neuromonitoring used throughout procedure. No complications. [place] in. No CSF leak detected. Nasal packing in place.

## 2024-10-23 NOTE — DIETITIAN INITIAL EVALUATION ADULT - BODY MASS INDEX
Future Appointments   Date Time Provider Department Center   10/29/2018 10:00 AM Joyce Armando MD [facility] [place]     Discharge rounds on patient. Discussed followup appointments, blue discharge folder, discharge nurse will go over home medications and reasons for medications and final discharge instructions. All patient/caregiver questions answered. Patient verbalized understanding.           10/23/18 1103   Final Note   Assessment Type Final Discharge Note   Anticipated Discharge Disposition Home   Hospital Follow Up  Appt(s) scheduled? Yes   Discharge plans and expectations educations in teach back method with documentation complete? Yes   Right Care Referral Info   Post Acute Recommendation No Care     Cheri Veras, RN, CCM, CMSRN  RN Transition Navigator  [phone]       24.9

## 2024-10-23 NOTE — DIETITIAN INITIAL EVALUATION ADULT - ORAL INTAKE PTA/DIET HISTORY
spoke with pt this AM. NPO today for procedure. fair po intake in house and PTA. however decreased appetite started 2 months ago. Pt suspects possible unintentional weight loss but unsure. UBW:150#. Last BM documented 10/22. A1c noted 5.7%. Pt with good understanding of nutrition/food choices for health management. NKFA. Will add glucerna BID for additional protein/calories. RD to remain available.

## 2024-10-23 NOTE — PROGRESS NOTE ADULT - SUBJECTIVE AND OBJECTIVE BOX
POST-OPERATIVE NOTE    Procedure: TSP for pituitary resection    Diagnosis/Indication: 2.2 x 3.0 x 3.0 cm sellar mass    Surgeon: Dr. Guevara    INTERVAL HPI/ACUTE EVENTS:  59F known to [facility] service, had transphenoidal resection 5 years ago, lost to follow up. Now presenting with recurrence, left visual loss progressive over several months now s/p transphenoidal resection of pituitary mass with Dr. Guevara on 10/23/24, POD#0. Patient admitted to NSICU postoperatively. Patient seen lying in bed, pain managed with medications. Denies any acute complaints.     VITALS:  T(C): 36.7 (10-23-24 @ 11:43), Max: 36.8 (10-23-24 @ 07:38)  HR: 97 (10-23-24 @ 11:43) (54 - 130)  BP: 131/84 (10-23-24 @ 11:43) (131/84 - 170/94)  RR: 16 (10-23-24 @ 11:43) (16 - 18)  SpO2: 100% (10-23-24 @ 11:43) (98% - 100%)  Wt(kg): --    PHYSICAL EXAM:  GENERAL: NAD  HEAD: Atraumatic, normocephalic  WOUND: Nasal packing clean dry intact  MENTAL STATUS: AAOx3; awake; opens eyes spontaneously; appropriately conversant without aphasia; following simple commands  CRANIAL NERVES: + L eye outer peripheral field cut. PERRL. EOMI without nystagmus. Facial sensation intact V1-3 distribution b/l. Face symmetric w/ normal eye closure and smile, tongue midline. Hearing grossly intact. Speech clear. Head turning and shoulder shrug intact.   MOTOR: Strength 5/5 b/l upper and lower extremities  SENSATION: Grossly intact to light touch all extremities  CHEST/LUNG: Non-labored breathing on room air  SKIN: Warm, dry    LABS:                        13.9   16.33 )-----------( 206      ( 23 Oct 2024 06:18 )             42.4     10-23    136  |  103  |  18.6  ----------------------------<  119[H]  4.5   |  20.0[L]  |  0.90    Ca    8.8      23 Oct 2024 06:18    RADIOLOGY/OTHER:  MR Brain Stereotactic w/wo IV Cont (10.18.24 @ 23:33)  IMPRESSION:  1.  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    Xray Chest 1 View- PORTABLE-Urgent (Xray Chest 1 View- PORTABLE-Urgent .)   IMPRESSION:   Clear lungs.

## 2024-10-23 NOTE — DIETITIAN INITIAL EVALUATION ADULT - ADD RECOMMEND
1) Monitor weights daily for trend/accuracy   2) Rx MVI daily, vit C 500mg   3) Provide encouragement/assistance as needed during mealtimes to inc PO

## 2024-10-23 NOTE — DIETITIAN INITIAL EVALUATION ADULT - PERTINENT LABORATORY DATA
10-23    136  |  103  |  18.6  ----------------------------<  119[H]  4.5   |  20.0[L]  |  0.90    Ca    8.8      23 Oct 2024 06:18    POCT Blood Glucose.: 150 mg/dL (10-23-24 @ 11:41)  A1C with Estimated Average Glucose Result: 5.7 % (10-19-24 @ 04:15)

## 2024-10-23 NOTE — PROGRESS NOTE ADULT - ASSESSMENT
58 yo F with hx of TSP for pituitary resection by Dr. Guevara in 2019 presents for blurry vision, she has been having blurry vision for a few weeks. Pt went to sight [facility] for eye exam due to blurry vision. 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       # Blurry Vission/  Large mass in the sella and suprasellar cistern, with chiasmatic encroachment: Likely  # Pituitary Macroadenoma with   # Hyperprolactinemia  Management as per Neurosurgery   Neuro checks  Plan for surgery during this admission  Hormone levels to monitor   Endocrine appreciated  monitor prolactin level      # Intermittent chest pain:   EKG with no acute changes  trops negative  TTE done and is unremarkable  will continue to monitor  obtain lipid panel in am   Trop in am  PRN pain meds   could not give aspirin given upcoming surgery  would recommend cardiology consult as patient might require stress test before procedure.    # Prediabetes: Counselled for low carb diet.     # HLD: her LDL is 159, would start atorvastatin 20mg daily.     # Leukocytosis, Due to steroids  - monitor for fever and trend WBC    # Heat intolerance possibly related to hyperprolactinemia.  TFT reviewed, normal.    # Hypertension  # Tachycardia  - Add low dose BB    Patient denies Hx of exertional dyspnea no personal or family hx of easy bleeding, Patient's METS Score is >4  and RCRI is 0, patient labs, EKG and Echo reviewed, patient is at intermediate risk for perioperative cardiovascular complication and is optimized from the medicine point of view for planned procedure,

## 2024-10-23 NOTE — PROGRESS NOTE ADULT - SUBJECTIVE AND OBJECTIVE BOX
HOSPITALIST PROGRESS NOTE    FERNANDO DOMINGUEZ  [id number]  59yFemale    Patient is a 59y old  Female who presents with a chief complaint of Pituitary Mass (23 Oct 2024 10:21)      SUBJECTIVE:   Chart reviewed since last visit.   Patient seen and examined at bedside for pituitary mass, hyperprolactinemia.  Feeling hot.  Mild headache, constant visual blurring      OBJECTIVE:  Vital Signs Last 24 Hrs  T(C): 36.7 (23 Oct 2024 11:43), Max: 36.8 (23 Oct 2024 07:38)  T(F): 98 (23 Oct 2024 11:43), Max: 98.3 (23 Oct 2024 07:38)  HR: 97 (23 Oct 2024 11:43) (54 - 130)  BP: 131/84 (23 Oct 2024 11:43) (119/72 - 170/94)  BP(mean): 106 (23 Oct 2024 11:00) (88 - 115)  RR: 16 (23 Oct 2024 11:43) (16 - 20)  SpO2: 100% (23 Oct 2024 11:43) (98% - 100%)    Parameters below as of 23 Oct 2024 11:00  Patient On (Oxygen Delivery Method): room air      PHYSICAL EXAMINATION  General: Middle aged female sitting in bed  HEENT:  extraocular movements intact pupils equal, responsive, reactive to light and accomodation   NECK:  Supple  CVS: regular rate and rhythm S1 S2  RESP:  Clear to auscultation bilaterally  GI:  Soft nondistended nontender BS+  : No suprapubic tenderness  MSK:  Full range of movement  CNS:  Awake, alert, oriented. Visual field loss bilateral peripheries. Strength 5/5 bilateral upper and lower extremities, intact sensation  INTEG:  warm dry skin  PSYCH:  Fair mood    MONITOR:  CAPILLARY BLOOD GLUCOSE      POCT Blood Glucose.: 150 mg/dL (23 Oct 2024 11:41)  POCT Blood Glucose.: 130 mg/dL (23 Oct 2024 11:11)  POCT Blood Glucose.: 111 mg/dL (23 Oct 2024 08:09)  POCT Blood Glucose.: 177 mg/dL (22 Oct 2024 20:55)  POCT Blood Glucose.: 173 mg/dL (22 Oct 2024 17:18)  POCT Blood Glucose.: 89 mg/dL (22 Oct 2024 13:27)        I&O's Summary    22 Oct 2024 07:01  -  23 Oct 2024 07:00  --------------------------------------------------------  IN: 1680 mL / OUT: 0 mL / NET: 1680 mL                            13.9   16.33 )-----------( 206      ( 23 Oct 2024 06:18 )             42.4       10-23    136  |  103  |  18.6  ----------------------------<  119[H]  4.5   |  20.0[L]  |  0.90    Ca    8.8      23 Oct 2024 06:18          Urinalysis Basic - ( 23 Oct 2024 06:18 )    Color: x / Appearance: x / SG: x / pH: x  Gluc: 119 mg/dL / Ketone: x  / Bili: x / Urobili: x   Blood: x / Protein: x / Nitrite: x   Leuk Esterase: x / RBC: x / WBC x   Sq Epi: x / Non Sq Epi: x / Bacteria: x            TTE:    RADIOLOGY        MEDICATIONS  (STANDING):  atorvastatin 20 milliGRAM(s) Oral at bedtime  dexAMETHasone  Injectable 4 milliGRAM(s) IV Push every 6 hours  dextrose 5%. 1000 milliLiter(s) (100 mL/Hr) IV Continuous <Continuous>  dextrose 5%. 1000 milliLiter(s) (50 mL/Hr) IV Continuous <Continuous>  dextrose 50% Injectable 25 Gram(s) IV Push once  dextrose 50% Injectable 12.5 Gram(s) IV Push once  dextrose 50% Injectable 25 Gram(s) IV Push once  glucagon  Injectable 1 milliGRAM(s) IntraMuscular once  insulin lispro (ADMELOG) corrective regimen sliding scale   SubCutaneous three times a day before meals  insulin lispro (ADMELOG) corrective regimen sliding scale   SubCutaneous at bedtime  metoprolol tartrate 25 milliGRAM(s) Oral two times a day  multivitamin 1 Tablet(s) Oral daily  pantoprazole  Injectable 40 milliGRAM(s) IV Push daily  polyethylene glycol 3350 17 Gram(s) Oral daily  senna 2 Tablet(s) Oral at bedtime  sodium chloride 0.9%. 1000 milliLiter(s) (60 mL/Hr) IV Continuous <Continuous>      MEDICATIONS  (PRN):  acetaminophen     Tablet .. 650 milliGRAM(s) Oral every 6 hours PRN Mild Pain (1 - 3)  dextrose Oral Gel 15 Gram(s) Oral once PRN Blood Glucose LESS THAN 70 milliGRAM(s)/deciliter  hydrALAZINE Injectable 10 milliGRAM(s) IV Push every 2 hours PRN SBP>160  ondansetron Injectable 4 milliGRAM(s) IV Push every 6 hours PRN Nausea and/or Vomiting

## 2024-10-23 NOTE — DIETITIAN INITIAL EVALUATION ADULT - OTHER INFO
58 yo F with hx of TSP for pituitary resection by Dr. Guevara in 2019 presents for blurry vision, she has been having blurry vision for a few weeks. Pt went to sight [facility] for eye exam due to blurry vision. They sent her for MRI which was preformed at a [facility] which showed increased size of pituitary macroadenoma with mass effect on optic chiasm., came in for admitted under Neurosurgery for further Management, imaging done here showed  Large mass in the sella and suprasellar cistern, with chiasmatic encroachment, over the course she has been complaining of intermittent chest pain, she has this chest pain for 3 weeks, no chest tenderness, no relation with exertion, EKG with no acute changes, trop 1st set done came negative too, TTE pending, medicine consulted for Medical Management.   #Blurry Vission/  Large mass in the sella and suprasellar cistern, with chiasmatic encroachment: Likely  # Pituitary Macroadenoma with   # Hyperprolactinemia  #prediabetes a1c 5.7%

## 2024-10-23 NOTE — PROGRESS NOTE ADULT - ASSESSMENT
59F PMH pituitary adenoma s/p resection 2019 by Dr. Guevara presented with new MRI finding of increasing size of known lesion causing visual deficit.    Plan:  - Q2 neuro checks  - Normotensive  - Plan for OR today 10/23 for transphenoidal pituitary resection  - Continue decadron 4q6, PPI, ISS  - Pain control as needed, avoid over sedation  - Cardiology and medicine cleared for OR  - Endocrine recs appreciated  - Bowel regimen: LBM 10/22  - DVT ppx: SCDs only, Lovenox held for the OR  - Has been NPO after midnight  - Discussed with Dr. Guevara 59F PMH pituitary adenoma s/p resection 2019 by Dr. Guevara presented with new MRI finding of increasing size of known lesion causing visual deficit.    Plan:  - Q2 neuro checks  - Normotensive  - Plan for OR today 10/23 for transphenoidal pituitary resection  - Continue decadron 4q6, PPI, ISS  - Pain control as needed, avoid over sedation  - Cardiology and medicine cleared for OR  - Medicine recs appreciated: Lopressor 25 BID and Atorvastatin 20 daily started  - Endocrine recs appreciated  - Bowel regimen: LBM 10/22  - DVT ppx: SCDs only, Lovenox held for the OR  - Has been NPO after midnight  - Discussed with Dr. Guevara

## 2024-10-23 NOTE — DIETITIAN INITIAL EVALUATION ADULT - PERTINENT MEDS FT
MEDICATIONS  (STANDING):  dextrose 5%. 1000 milliLiter(s) (100 mL/Hr) IV Continuous <Continuous>  insulin lispro (ADMELOG) corrective regimen sliding scale   SubCutaneous three times a day before meals  multivitamin 1 Tablet(s) Oral daily  pantoprazole  Injectable 40 milliGRAM(s) IV Push daily  senna 2 Tablet(s) Oral at bedtime  sodium chloride 0.9%. 1000 milliLiter(s) (60 mL/Hr) IV Continuous <Continuous>    MEDICATIONS  (PRN):  ondansetron Injectable 4 milliGRAM(s) IV Push every 6 hours PRN Nausea and/or Vomiting

## 2024-10-23 NOTE — PROGRESS NOTE ADULT - SUBJECTIVE AND OBJECTIVE BOX
HPI:  58 yo F with hx of TSP for pituitary resection by Dr. Guevara in 2019 presents for blurry vision. Pt has been having blurry vision for a few weeks. Pt went to [facility] for eye exam due to blurry vision. They sent her for MRI which was preformed at a [facility] which showed increased size of pituitary macroadenoma with mass effect on optic chiasm. Pt did not have Dr. Guevara's info so presented to [facility]. Pt transferred to [facility]. Pt denies HA, SOB, palpitations.  (18 Oct 2024 19:16)    INTERVAL HPI/OVERNIGHT EVENTS:  59F s/p seen lying comfortably in bed. Patient states she is anxious for OR today.  this AM, given IVF, denies any palpitations chest pain, SOB, or lightheadedness. /94 this AM, given hydralazine IVP x 1, improved. Pt reports stable L eye blurry vision/vision loss, denies any other acute complaints.    Vital Signs Last 24 Hrs  T(C): 36.8 (23 Oct 2024 07:38), Max: 37.2 (22 Oct 2024 12:00)  T(F): 98.3 (23 Oct 2024 07:38), Max: 98.9 (22 Oct 2024 12:00)  HR: 122 (23 Oct 2024 10:00) (54 - 122)  BP: 152/92 (23 Oct 2024 10:00) (119/72 - 170/94)  BP(mean): 111 (23 Oct 2024 10:00) (88 - 115)  RR: 18 (23 Oct 2024 10:00) (16 - 20)  SpO2: 100% (23 Oct 2024 10:00) (98% - 100%)    Parameters below as of 23 Oct 2024 10:00  Patient On (Oxygen Delivery Method): room air    PHYSICAL EXAM:  GENERAL: NAD, well-groomed  HEAD: Atraumatic, normocephalic  JOSE COMA SCORE: E-4 V-5 M-6 = 15  MENTAL STATUS: AAO x3; Awake; Opens eyes spontaneously; Appropriately conversant without aphasia; following commands  CRANIAL NERVES: L eye outer peripheral field cut, PERRL. EOMI without nystagmus. Facial sensation intact V1-3 distribution b/l. Face symmetric w/ normal eye closure and smile, tongue midline. Hearing grossly intact. Speech clear.   MOTOR: strength 5/5 b/l upper and lower extremities  SENSATION: grossly intact to light touch all extremities  CHEST/LUNG: Nonlabored breathing  SKIN: Warm, dry    LABS:                        13.9   16.33 )-----------( 206      ( 23 Oct 2024 06:18 )             42.4     10-23    136  |  103  |  18.6  ----------------------------<  119[H]  4.5   |  20.0[L]  |  0.90    Ca    8.8      23 Oct 2024 06:18    Urinalysis Basic - ( 23 Oct 2024 06:18 )    Color: x / Appearance: x / SG: x / pH: x  Gluc: 119 mg/dL / Ketone: x  / Bili: x / Urobili: x   Blood: x / Protein: x / Nitrite: x   Leuk Esterase: x / RBC: x / WBC x   Sq Epi: x / Non Sq Epi: x / Bacteria: x    10-22 @ 07:01  -  10-23 @ 07:00  --------------------------------------------------------  IN: 1680 mL / OUT: 0 mL / NET: 1680 mL    RADIOLOGY & ADDITIONAL TESTS:  < from: MR Brain Stereotactic w/wo IV Cont (10.18.24 @ 23:33) >  IMPRESSION:    1.  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    < from: US Duplex Venous Lower Ext Complete, Bilateral (10.20.24 @ 03:56) >  IMPRESSION:  No evidence of deep venous thrombosis in either lower extremity. HPI:  58 yo F with hx of TSP for pituitary resection by Dr. Guevara in 2019 presents for blurry vision. Pt has been having blurry vision for a few weeks. Pt went to [facility] for eye exam due to blurry vision. They sent her for MRI which was preformed at a [facility] which showed increased size of pituitary macroadenoma with mass effect on optic chiasm. Pt did not have Dr. Guevara's info so presented to [facility]. Pt transferred to [facility]. Pt denies HA, SOB, palpitations.  (18 Oct 2024 19:16)    INTERVAL HPI/OVERNIGHT EVENTS:  59F s/p seen lying comfortably in bed. Patient states she is anxious for OR today.  this AM, given IVF and lopressor IVP x 1, denies any palpitations chest pain, SOB, or lightheadedness. /94 this AM, given hydralazine IVP x 1, improved. Pt reports stable L eye blurry vision/vision loss, denies any other acute complaints.    Vital Signs Last 24 Hrs  T(C): 36.8 (23 Oct 2024 07:38), Max: 37.2 (22 Oct 2024 12:00)  T(F): 98.3 (23 Oct 2024 07:38), Max: 98.9 (22 Oct 2024 12:00)  HR: 122 (23 Oct 2024 10:00) (54 - 122)  BP: 152/92 (23 Oct 2024 10:00) (119/72 - 170/94)  BP(mean): 111 (23 Oct 2024 10:00) (88 - 115)  RR: 18 (23 Oct 2024 10:00) (16 - 20)  SpO2: 100% (23 Oct 2024 10:00) (98% - 100%)    Parameters below as of 23 Oct 2024 10:00  Patient On (Oxygen Delivery Method): room air    PHYSICAL EXAM:  GENERAL: NAD, well-groomed  HEAD: Atraumatic, normocephalic  JOSE COMA SCORE: E-4 V-5 M-6 = 15  MENTAL STATUS: AAO x3; Awake; Opens eyes spontaneously; Appropriately conversant without aphasia; following commands  CRANIAL NERVES: L eye outer peripheral field cut, PERRL. EOMI without nystagmus. Facial sensation intact V1-3 distribution b/l. Face symmetric w/ normal eye closure and smile, tongue midline. Hearing grossly intact. Speech clear.   MOTOR: strength 5/5 b/l upper and lower extremities  SENSATION: grossly intact to light touch all extremities  CHEST/LUNG: Nonlabored breathing  SKIN: Warm, dry    LABS:                        13.9   16.33 )-----------( 206      ( 23 Oct 2024 06:18 )             42.4     10-23    136  |  103  |  18.6  ----------------------------<  119[H]  4.5   |  20.0[L]  |  0.90    Ca    8.8      23 Oct 2024 06:18    Urinalysis Basic - ( 23 Oct 2024 06:18 )    Color: x / Appearance: x / SG: x / pH: x  Gluc: 119 mg/dL / Ketone: x  / Bili: x / Urobili: x   Blood: x / Protein: x / Nitrite: x   Leuk Esterase: x / RBC: x / WBC x   Sq Epi: x / Non Sq Epi: x / Bacteria: x    10-22 @ 07:01  -  10-23 @ 07:00  --------------------------------------------------------  IN: 1680 mL / OUT: 0 mL / NET: 1680 mL    RADIOLOGY & ADDITIONAL TESTS:  < from: MR Brain Stereotactic w/wo IV Cont (10.18.24 @ 23:33) >  IMPRESSION:    1.  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    < from: US Duplex Venous Lower Ext Complete, Bilateral (10.20.24 @ 03:56) >  IMPRESSION:  No evidence of deep venous thrombosis in either lower extremity.

## 2024-10-23 NOTE — BRIEF OPERATIVE NOTE - FIRST ASSIST CATEGORY
"    9/27/2023         RE: Caitlyn Cardenas  [address] [place]  [place] [address]        Dear Colleague,    Thank you for referring your patient, Caitlyn Cardenas, to the [facility] [place]. Please see a copy of my visit note below.    BMT Progress note  09/27/2023     Chief complaint:  Caitlyn Cardenas is a 66 year old  female with hx of left breast hemagioendothelioma, DLBCL, and MDS, undergoing a 8/8 ALIREZA MUD PBSCT, day +20     INTERIM HISTORY:     Leatha is here for follow up after her hospital discharge. She is doing well. Feeling fatigued, eating well, good fluid intake, nausea controlled w/ Zofran BID, diarrhea controlled with imodium x1. She said the rash looks better & not itchy.     REVIEW OF SYSTEMS: Otherwise unremarkable other than what is noted in the Interim History.  PHYSICAL EXAM:   Vitals:  /83 (BP Location: Left arm)   Pulse 117   Temp 98.3  F (36.8  C) (Oral)   Resp 16   Ht 1.665 m (5' 5.55\")   Wt 54.3 kg (119 lb 12.8 oz)   SpO2 95%   BMI 19.60 kg/m           Wt Readings from Last 4 Encounters:   09/26/23 54.3 kg (119 lb 12.8 oz)   08/24/23 55.8 kg (123 lb)   08/24/23 55.3 kg (122 lb)   08/22/23 55.8 kg (123 lb)      General: NAD   Eyes: SHERRY, sclera anicteric   Lungs: CTA bilaterally  Cardiovascular: tachycardic--100s on exam, RRhythm, no M/R/G   Lymphatics: No edema  Skin: discrete papules on chest; not pruritic or painful.  No bullae/blisters.  Additional Findings: CVC NT, no drainage.     Current aGVHD staging:  Skin 0, UGI 0, LGI 0, Liver 0 (keep in note through day +180 for allos)       ASSESSMENT AND PLAN:   Caitlyn Cardenas is a 66 year old  female with hx of left breast hemagioendothelioma, DLBCL, and MDS, undergoing a 8/8 ALIREZA MUD PBSCT, day +20.     BMT/IEC PROTOCOL for 2022-52G SOC  - Chemo protocol: Cy/Flu/TBI   - Donor is O+ and patient is A+, minor mismatch.   - Restaging plan: day +21 RFLP and BM bx on 9/29 in clinic.   "   HEME/COAG  - pancytopenic due to MDS, chemotherapy and radiation  - Transfusion parameters: hemoglobin <7.5 g/dL as outpt, platelets <10k  - Continue daily GCSF until ANC>2500 x 2 consecutive days.      IMMUNOCOMPROMISED  - Prophylaxis plan: ACV (CMV-/-), Patricia through day +45 then resume vori (prolonged cytopenias/pulm nodules), Bactrim to start at day +28     RISK OF GVHD  - Prophylaxis: MMF/Siro/PtCy  - siro level (9/25) 9.5     CARDIOVASCULAR  - Risk of cardiomyopathy:  Baseline EF LVEF 60-65%   - Sinus Tachycardia: improved with improving hypovolemia (changed Hgb parameter to 7.5 g/dL)     GI/NUTRITION  - Ulcer prophylaxis: protonix  - VOD prophylaxis: ursodiol   - nausea/vomiting due to chemo/radiation: scheduled compazine. PRN ativan/zofran.   - Chronic diarrhea w/out acute symptoms: Loose stools predated transplant by about 4 years.  Cdiff recheck neg. Imodium, lomotil and Citrucel added as bulking agent (PTA med).     RENAL/ELECTROLYTES/  - Electrolyte management: replace per sliding scale  - Hypokalemia: start kdur 20mEq BID.         Summary:   - Patricia & G-CSF today   - Scheduled Patricia MWF (9/27 - 10/20)   - 9/29 for lab, BM bx, provider  - 10/3 review w/ Dr. Christo PATEL spent 40 minutes face-to-face and/or coordinating care. Over 50% of our time on the unit was spent counseling the patient and/or coordinating care and the plan detailed on today's notes.         Travis Cruz PA-C      No Resident Program within this Specialty at this Location

## 2024-10-23 NOTE — BRIEF OPERATIVE NOTE - NSICDXBRIEFPROCEDURE_GEN_ALL_CORE_FT
PROCEDURES:  Resection, tumor, pituitary, transphenoidal or transnasal, with MRI guidance 23-Oct-2024 16:38:29  Merry Treviño

## 2024-10-23 NOTE — PRE PROCEDURE NOTE - PRE PROCEDURE EVALUATION
Patient known to our service, had transpenoidal resection 5 years ago, lost to follow up. Now presenting with recurrence, left visual loss progressive over several months. recently had an outpatient MRI showing large mass with optic compression. Procedure discussed with , risks and benefits explained. including possible stroke, hemorrhage, visual issues and incomplete resection which was discussed and possible post op radiation if residual growth.  Patient to proceed with surgery.

## 2024-10-23 NOTE — CONSULT NOTE ADULT - SUBJECTIVE AND OBJECTIVE BOX
HISTORY OF PRESENT ILLNESS:   Patient is a 59-year-old female with PMH of TSP for pituitary resection by Dr. Guevara in 2019 presents for blurry vision. Pt has been having blurry vision for a few weeks. Pt went to [facility] for eye exam due to blurry vision. They sent her for MRI which was preformed at a [facility] which showed increased size of pituitary macroadenoma with mass effect on optic chiasm. Pt did not have Dr. Guevara's info so presented to [facility]. Pt transferred to [facility]. Pt denies HA, SOB, palpitations. (18 Oct 2024 19:16)    INTERVAL HPI:  59F known to NS service, had transphenoidal resection 5 years ago, lost to follow up. Now presenting with recurrence, left visual loss progressive over several months now s/p transphenoidal resection of pituitary mass with Dr. Guevara on 10/23/24, POD#0. Patient will be admitted to NSICU postoperatively. Patient seen lying in bed, pain managed with medications. Denies any acute complaints.     PAST MEDICAL & SURGICAL HISTORY:  History of pituitary surgery  S/P  section  History of pituitary tumor    SOCIAL HISTORY:  Tobacco Use:  EtOH use:   Substance:    REVIEW OF SYSTEMS  Negative except as noted in HPI    MEDICATIONS:  Neuro:  fentaNYL    Injectable 50 MICROGram(s) IV Push every 5 minutes PRN  HYDROmorphone  Injectable 0.5 milliGRAM(s) IV Push every 10 minutes PRN  ondansetron Injectable 4 milliGRAM(s) IV Push once PRN    IVF:  lactated ringers. 1000 milliLiter(s) IV Continuous <Continuous>    Vital Signs Last 24 Hrs  T(C): 36.7 (23 Oct 2024 11:43), Max: 36.8 (23 Oct 2024 07:38)  T(F): 98 (23 Oct 2024 11:43), Max: 98.3 (23 Oct 2024 07:38)  HR: 97 (23 Oct 2024 11:43) (54 - 130)  BP: 131/84 (23 Oct 2024 11:43) (131/84 - 170/94)  BP(mean): 106 (23 Oct 2024 11:00) (96 - 115)  RR: 16 (23 Oct 2024 11:43) (16 - 18)  SpO2: 100% (23 Oct 2024 11:43) (98% - 100%)  Parameters below as of 23 Oct 2024 11:00  Patient On (Oxygen Delivery Method): room air    PHYSICAL EXAM:  GENERAL: NAD  HEAD: Atraumatic, normocephalic  WOUND: Nasal packing clean dry intact  MENTAL STATUS: AAOx3; awake; opens eyes spontaneously; appropriately conversant without aphasia; following simple commands  CRANIAL NERVES: + L eye outer peripheral field cut. PERRL. EOMI without nystagmus. Facial sensation intact V1-3 distribution b/l. Face symmetric w/ normal eye closure and smile, tongue midline. Hearing grossly intact. Speech clear. Head turning and shoulder shrug intact.   MOTOR: Strength 5/5 b/l upper and lower extremities  SENSATION: Grossly intact to light touch all extremities  CHEST/LUNG: Non-labored breathing on room air  SKIN: Warm, dry    LABS:                        13.9   16.33 )-----------( 206      ( 23 Oct 2024 06:18 )             42.4     10-23    136  |  103  |  18.6  ----------------------------<  119[H]  4.5   |  20.0[L]  |  0.90    Ca    8.8      23 Oct 2024 06:18    Urinalysis Basic - ( 23 Oct 2024 06:18 )  Color: x / Appearance: x / SG: x / pH: x  Gluc: 119 mg/dL / Ketone: x  / Bili: x / Urobili: x   Blood: x / Protein: x / Nitrite: x   Leuk Esterase: x / RBC: x / WBC x   Sq Epi: x / Non Sq Epi: x / Bacteria: x    RADIOLOGY & ADDITIONAL STUDIES:    MR Brain Stereotactic w/wo IV Cont (10.18.24 @ 23:33)  IMPRESSION:  1.  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    Xray Chest 1 View- PORTABLE-Urgent (Xray Chest 1 View- PORTABLE-Urgent .)   IMPRESSION:   Clear lungs.   HISTORY OF PRESENT ILLNESS:   Patient is a 59-year-old female with PMH of TSP for pituitary resection by Dr. Guevara in 2019 presents for blurry vision. Pt has been having blurry vision for a few weeks. Pt went to [facility] for eye exam due to blurry vision. They sent her for MRI which was preformed at a [facility] which showed increased size of pituitary macroadenoma with mass effect on optic chiasm. Pt did not have Dr. Guevara's info so presented to [facility]. Pt transferred to [facility]. Pt denies HA, SOB, palpitations. (18 Oct 2024 19:16)    INTERVAL HPI:  59F known to NS service, had transphenoidal resection 5 years ago, lost to follow up. Now presenting with recurrence, left visual loss progressive over several months now s/p transphenoidal resection of pituitary mass with Dr. Guevara on 10/23/24, POD#0. Patient will be admitted to NSICU postoperatively. Patient seen lying in bed, pain managed with medications. Denies any acute complaints.     PAST MEDICAL & SURGICAL HISTORY:  History of pituitary surgery  S/P  section  History of pituitary tumor    REVIEW OF SYSTEMS  Negative except as noted in HPI    MEDICATIONS:  Neuro:  fentaNYL    Injectable 50 MICROGram(s) IV Push every 5 minutes PRN  HYDROmorphone  Injectable 0.5 milliGRAM(s) IV Push every 10 minutes PRN  ondansetron Injectable 4 milliGRAM(s) IV Push once PRN    IVF:  lactated ringers. 1000 milliLiter(s) IV Continuous <Continuous>    Vital Signs Last 24 Hrs  T(C): 36.7 (23 Oct 2024 11:43), Max: 36.8 (23 Oct 2024 07:38)  T(F): 98 (23 Oct 2024 11:43), Max: 98.3 (23 Oct 2024 07:38)  HR: 97 (23 Oct 2024 11:43) (54 - 130)  BP: 131/84 (23 Oct 2024 11:43) (131/84 - 170/94)  BP(mean): 106 (23 Oct 2024 11:00) (96 - 115)  RR: 16 (23 Oct 2024 11:43) (16 - 18)  SpO2: 100% (23 Oct 2024 11:43) (98% - 100%)  Parameters below as of 23 Oct 2024 11:00  Patient On (Oxygen Delivery Method): room air    PHYSICAL EXAM:  GENERAL: NAD  HEAD: Atraumatic, normocephalic  WOUND: Nasal packing clean dry intact  MENTAL STATUS: AAOx3; awake; opens eyes spontaneously; appropriately conversant without aphasia; following simple commands  CRANIAL NERVES: + L eye blurred vision but peripheral fields intact. PERRL. EOMI without nystagmus. Facial sensation intact V1-3 distribution b/l. Face symmetric w/ normal eye closure and smile, tongue midline. Hearing grossly intact. Speech clear. Head turning and shoulder shrug intact.   MOTOR: Strength 5/5 b/l upper and lower extremities  SENSATION: Grossly intact to light touch all extremities  CHEST/LUNG: Non-labored breathing on room air  SKIN: Warm, dry    LABS:                        13.9   16.33 )-----------( 206      ( 23 Oct 2024 06:18 )             42.4     10-23    136  |  103  |  18.6  ----------------------------<  119[H]  4.5   |  20.0[L]  |  0.90    Ca    8.8      23 Oct 2024 06:18    Urinalysis Basic - ( 23 Oct 2024 06:18 )  Color: x / Appearance: x / SG: x / pH: x  Gluc: 119 mg/dL / Ketone: x  / Bili: x / Urobili: x   Blood: x / Protein: x / Nitrite: x   Leuk Esterase: x / RBC: x / WBC x   Sq Epi: x / Non Sq Epi: x / Bacteria: x    RADIOLOGY & ADDITIONAL STUDIES:    MR Brain Stereotactic w/wo IV Cont (10.18.24 @ 23:33)  IMPRESSION:  1.  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    Xray Chest 1 View- PORTABLE-Urgent (Xray Chest 1 View- PORTABLE-Urgent .)   IMPRESSION:   Clear lungs.

## 2024-10-23 NOTE — CONSULT NOTE ADULT - ASSESSMENT
59F known to [facility] service, had transphenoidal resection 5 years ago, lost to follow up. Now presenting with recurrence, left visual loss progressive over several months now s/p transphenoidal resection of pituitary mass with Dr. Guevara on 10/23/24, POD#0.    PLAN:    NEURO:  - Q1h neuro checks  - MRI sella w w/o within 48h  - Pain control PRN, avoid oversedation  - Transphenoidal precautions: NO nose blowing, CPAP/BiPAP, nasal cannula, nasal swabs, NGT, straws or incentive spirometry; avoid straining  - Remove nasal packing in 48h after MRI completed   - Monitor for any signs of CSF leak    PULM:  - Pituitary precautions (no incentive spirometry, no straws, no BIPAP)  - Maintain SpO2 >92%    CV:  - Normotension SBP   - Labetalol/Hydralazine PRN     RENAL:  - BMP q6h  - DI watch, strict I&Os; mcgee in    - Mcgee for strict I+Os  - IVF until good PO intake  - Drink to thirst    GI:  - Diet: Dysphagia screen and then advance diet as tolerated  - GI prophylaxis as on steroids  - Bowel regimen   - Diet: Dysphagia screen and then advance diet as tolerated  - Bowel regimen: senna, miralax    ENDO:   - Goal euglycemia (-180)  - Endocrine labs in AM-TSH, FSH, LH, ACTH, prolactin w/dilution, T3, T4, alpha subunit, estrogen, GH, IGF-1, serum cortisol; Needs endocrine consult in AM  - DI watch    HEME/ONC:  - VTE prophylaxis: SCDs, hold chemoprophylaxis due to: post-op bleeding risk    ID:  - Maintain normothermia         59F known to [facility] service, had transphenoidal resection 5 years ago, lost to follow up. Now presenting with recurrence, left visual loss progressive over several months now s/p transphenoidal resection of pituitary mass with Dr. Guevara on 10/23/24, POD#0.    PLAN:    NEURO:  - Q1h neuro checks  - MRI sella w w/o within 48h  - Pain control PRN, avoid oversedation  - Transphenoidal precautions: NO nose blowing, CPAP/BiPAP, nasal cannula, nasal swabs, NGT, straws or incentive spirometry; avoid straining  - Remove nasal packing in 48h after MRI completed   - Monitor for any signs of CSF leak  - HOB at least 45 degrees  - PT/OT eval in the AM    PULM:  - Pituitary precautions (no incentive spirometry, no straws, no BIPAP)  - Maintain SpO2 >92%    CV:  - Normotension SBP   - Labetalol/Hydralazine PRN     RENAL:  - BMP q6h  - DI watch, strict I&Os; mcgee in  - IVF until good PO intake  - Drink to thirst    GI:  - Diet: Dysphagia screen and then advance diet as tolerated  - GI prophylaxis as on steroids  - Bowel regimen   - Diet: Dysphagia screen and then advance diet as tolerated  - Bowel regimen: senna, miralax    ENDO:   - Goal euglycemia (-180)  - Endocrine labs in AM-TSH, FSH, LH, ACTH, prolactin w/dilution, T3, T4, alpha subunit, estrogen, GH, IGF-1, serum cortisol; Needs endocrine consult in AM  - Solucortef taper    HEME/ONC:  - VTE prophylaxis: SCDs, hold chemoprophylaxis due to: post-op bleeding risk    ID:  - Ancef x 3 doses  - Maintain normothermia         59F known to [facility] service, had transphenoidal resection 5 years ago, lost to follow up. Now presenting with recurrence, left visual loss progressive over several months now s/p transphenoidal resection of pituitary mass with Dr. Guevara on 10/23/24, POD#0.    PLAN:    NEURO:  - Q1h neuro checks  - MRI sella w w/o within 48h  - Pain control PRN, avoid oversedation  - Transphenoidal precautions: NO nose blowing, CPAP/BiPAP, nasal cannula, nasal swabs, NGT, straws or incentive spirometry; avoid straining  - Remove nasal packing in 48h after MRI completed   - Monitor for any signs of CSF leak  - HOB at least 45 degrees  - PT/OT eval in the AM    PULM:  - Pituitary precautions (no incentive spirometry, no straws, no BIPAP)  - Maintain SpO2 >92%    CV:  - Normotension SBP   - Labetalol/Hydralazine PRN     RENAL:  - BMP q6h  - DI watch, strict I&Os; mcgee in  - IVF until good PO intake  - Drink to thirst    GI:  - Diet: Dysphagia screen and then advance diet as tolerated  - GI prophylaxis as on steroids  - Diet: Dysphagia screen and then advance diet as tolerated  - Bowel regimen: senna, miralax    ENDO:   - Solucortef taper  - Endocrine labs in AM-TSH, FSH, LH, ACTH, prolactin w/dilution, T3, T4, alpha subunit, estrogen, GH, IGF-1, serum cortisol; Needs endocrine consult in AM  - Goal euglycemia (-180)    HEME/ONC:  - VTE prophylaxis: SCDs, hold chemoprophylaxis due to: post-op bleeding risk    ID:  - Ancef x 3 doses  - Maintain normothermia

## 2024-10-24 LAB
ACTH SER-ACNC: 9.2 PG/ML — SIGNIFICANT CHANGE UP (ref 7.2–63.3)
ANION GAP SERPL CALC-SCNC: 11 MMOL/L — SIGNIFICANT CHANGE UP (ref 5–17)
ANION GAP SERPL CALC-SCNC: 12 MMOL/L — SIGNIFICANT CHANGE UP (ref 5–17)
ANION GAP SERPL CALC-SCNC: 13 MMOL/L — SIGNIFICANT CHANGE UP (ref 5–17)
APPEARANCE UR: CLEAR — SIGNIFICANT CHANGE UP
APPEARANCE UR: CLEAR — SIGNIFICANT CHANGE UP
BACTERIA # UR AUTO: NEGATIVE /HPF — SIGNIFICANT CHANGE UP
BILIRUB UR-MCNC: NEGATIVE — SIGNIFICANT CHANGE UP
BILIRUB UR-MCNC: NEGATIVE — SIGNIFICANT CHANGE UP
BUN SERPL-MCNC: 12.1 MG/DL — SIGNIFICANT CHANGE UP (ref 8–20)
BUN SERPL-MCNC: 13.4 MG/DL — SIGNIFICANT CHANGE UP (ref 8–20)
BUN SERPL-MCNC: 15.4 MG/DL — SIGNIFICANT CHANGE UP (ref 8–20)
CALCIUM SERPL-MCNC: 8.4 MG/DL — SIGNIFICANT CHANGE UP (ref 8.4–10.5)
CALCIUM SERPL-MCNC: 8.4 MG/DL — SIGNIFICANT CHANGE UP (ref 8.4–10.5)
CALCIUM SERPL-MCNC: 9 MG/DL — SIGNIFICANT CHANGE UP (ref 8.4–10.5)
CAST: 3 /LPF — SIGNIFICANT CHANGE UP (ref 0–4)
CHLORIDE SERPL-SCNC: 103 MMOL/L — SIGNIFICANT CHANGE UP (ref 96–108)
CHLORIDE SERPL-SCNC: 104 MMOL/L — SIGNIFICANT CHANGE UP (ref 96–108)
CHLORIDE SERPL-SCNC: 105 MMOL/L — SIGNIFICANT CHANGE UP (ref 96–108)
CO2 SERPL-SCNC: 21 MMOL/L — LOW (ref 22–29)
COLOR SPEC: YELLOW — SIGNIFICANT CHANGE UP
COLOR SPEC: YELLOW — SIGNIFICANT CHANGE UP
CORTIS AM PEAK SERPL-MCNC: 32.9 UG/DL — HIGH (ref 6–18.4)
CREAT SERPL-MCNC: 0.66 MG/DL — SIGNIFICANT CHANGE UP (ref 0.5–1.3)
CREAT SERPL-MCNC: 0.68 MG/DL — SIGNIFICANT CHANGE UP (ref 0.5–1.3)
CREAT SERPL-MCNC: 0.71 MG/DL — SIGNIFICANT CHANGE UP (ref 0.5–1.3)
DIFF PNL FLD: ABNORMAL
DIFF PNL FLD: NEGATIVE — SIGNIFICANT CHANGE UP
EGFR: 100 ML/MIN/1.73M2 — SIGNIFICANT CHANGE UP
EGFR: 97 ML/MIN/1.73M2 — SIGNIFICANT CHANGE UP
EGFR: 99 ML/MIN/1.73M2 — SIGNIFICANT CHANGE UP
FSH SERPL-MCNC: 1.4 IU/L — SIGNIFICANT CHANGE UP
GH SERPL-MCNC: 0.5 NG/ML — SIGNIFICANT CHANGE UP (ref 0.12–9.88)
GLUCOSE SERPL-MCNC: 117 MG/DL — HIGH (ref 70–99)
GLUCOSE SERPL-MCNC: 124 MG/DL — HIGH (ref 70–99)
GLUCOSE SERPL-MCNC: 157 MG/DL — HIGH (ref 70–99)
GLUCOSE UR QL: NEGATIVE MG/DL — SIGNIFICANT CHANGE UP
GLUCOSE UR QL: NEGATIVE MG/DL — SIGNIFICANT CHANGE UP
HCT VFR BLD CALC: 39.7 % — SIGNIFICANT CHANGE UP (ref 34.5–45)
HGB BLD-MCNC: 13.3 G/DL — SIGNIFICANT CHANGE UP (ref 11.5–15.5)
KETONES UR-MCNC: NEGATIVE MG/DL — SIGNIFICANT CHANGE UP
KETONES UR-MCNC: NEGATIVE MG/DL — SIGNIFICANT CHANGE UP
LEUKOCYTE ESTERASE UR-ACNC: NEGATIVE — SIGNIFICANT CHANGE UP
LEUKOCYTE ESTERASE UR-ACNC: NEGATIVE — SIGNIFICANT CHANGE UP
LH SERPL-ACNC: 0.4 IU/L — SIGNIFICANT CHANGE UP
MCHC RBC-ENTMCNC: 30.1 PG — SIGNIFICANT CHANGE UP (ref 27–34)
MCHC RBC-ENTMCNC: 33.5 GM/DL — SIGNIFICANT CHANGE UP (ref 32–36)
MCV RBC AUTO: 89.8 FL — SIGNIFICANT CHANGE UP (ref 80–100)
NITRITE UR-MCNC: NEGATIVE — SIGNIFICANT CHANGE UP
NITRITE UR-MCNC: NEGATIVE — SIGNIFICANT CHANGE UP
PH UR: 6 — SIGNIFICANT CHANGE UP (ref 5–8)
PH UR: 6.5 — SIGNIFICANT CHANGE UP (ref 5–8)
PLATELET # BLD AUTO: 145 K/UL — LOW (ref 150–400)
POTASSIUM SERPL-MCNC: 3.4 MMOL/L — LOW (ref 3.5–5.3)
POTASSIUM SERPL-MCNC: 3.8 MMOL/L — SIGNIFICANT CHANGE UP (ref 3.5–5.3)
POTASSIUM SERPL-MCNC: 4.3 MMOL/L — SIGNIFICANT CHANGE UP (ref 3.5–5.3)
POTASSIUM SERPL-SCNC: 3.4 MMOL/L — LOW (ref 3.5–5.3)
POTASSIUM SERPL-SCNC: 3.8 MMOL/L — SIGNIFICANT CHANGE UP (ref 3.5–5.3)
POTASSIUM SERPL-SCNC: 4.3 MMOL/L — SIGNIFICANT CHANGE UP (ref 3.5–5.3)
PROLACTIN SERPL-MCNC: 12.4 NG/ML — SIGNIFICANT CHANGE UP (ref 3.4–24.1)
PROT UR-MCNC: 30 MG/DL
PROT UR-MCNC: NEGATIVE MG/DL — SIGNIFICANT CHANGE UP
RBC # BLD: 4.42 M/UL — SIGNIFICANT CHANGE UP (ref 3.8–5.2)
RBC # FLD: 13.7 % — SIGNIFICANT CHANGE UP (ref 10.3–14.5)
RBC CASTS # UR COMP ASSIST: 41 /HPF — HIGH (ref 0–4)
SODIUM SERPL-SCNC: 137 MMOL/L — SIGNIFICANT CHANGE UP (ref 135–145)
SP GR SPEC: 1.02 — SIGNIFICANT CHANGE UP (ref 1–1.03)
SP GR SPEC: >1.03 — HIGH (ref 1–1.03)
SQUAMOUS # UR AUTO: 2 /HPF — SIGNIFICANT CHANGE UP (ref 0–5)
TSH SERPL-MCNC: 0.21 UIU/ML — LOW (ref 0.27–4.2)
UROBILINOGEN FLD QL: 0.2 MG/DL — SIGNIFICANT CHANGE UP (ref 0.2–1)
UROBILINOGEN FLD QL: 1 MG/DL — SIGNIFICANT CHANGE UP (ref 0.2–1)
WBC # BLD: 16.6 K/UL — HIGH (ref 3.8–10.5)
WBC # FLD AUTO: 16.6 K/UL — HIGH (ref 3.8–10.5)
WBC UR QL: 5 /HPF — SIGNIFICANT CHANGE UP (ref 0–5)

## 2024-10-24 PROCEDURE — 99231 SBSQ HOSP IP/OBS SF/LOW 25: CPT

## 2024-10-24 PROCEDURE — 70553 MRI BRAIN STEM W/O & W/DYE: CPT | Mod: 26,76

## 2024-10-24 PROCEDURE — 99232 SBSQ HOSP IP/OBS MODERATE 35: CPT

## 2024-10-24 RX ORDER — HYDROCORTISONE 10 MG/1
25 TABLET ORAL EVERY 12 HOURS
Refills: 0 | Status: COMPLETED | OUTPATIENT
Start: 2024-10-25 | End: 2024-10-25

## 2024-10-24 RX ORDER — ENOXAPARIN SODIUM 40MG/0.4ML
40 SYRINGE (ML) SUBCUTANEOUS
Refills: 0 | Status: DISCONTINUED | OUTPATIENT
Start: 2024-10-24 | End: 2024-10-28

## 2024-10-24 RX ORDER — POTASSIUM CHLORIDE 10 MEQ
40 TABLET, EXTENDED RELEASE ORAL ONCE
Refills: 0 | Status: COMPLETED | OUTPATIENT
Start: 2024-10-24 | End: 2024-10-24

## 2024-10-24 RX ORDER — HYDROCORTISONE 10 MG/1
50 TABLET ORAL ONCE
Refills: 0 | Status: COMPLETED | OUTPATIENT
Start: 2024-10-24 | End: 2024-10-24

## 2024-10-24 RX ORDER — ACETAMINOPHEN 500 MG
1000 TABLET ORAL ONCE
Refills: 0 | Status: COMPLETED | OUTPATIENT
Start: 2024-10-24 | End: 2024-10-24

## 2024-10-24 RX ORDER — HYDROCORTISONE 10 MG/1
10 TABLET ORAL EVERY 12 HOURS
Refills: 0 | Status: COMPLETED | OUTPATIENT
Start: 2024-10-26 | End: 2024-10-26

## 2024-10-24 RX ADMIN — Medication 2 TABLET(S): at 21:04

## 2024-10-24 RX ADMIN — SODIUM CHLORIDE 70 MILLILITER(S): 9 INJECTION, SOLUTION INTRAMUSCULAR; INTRAVENOUS; SUBCUTANEOUS at 16:40

## 2024-10-24 RX ADMIN — Medication 1000 MILLIGRAM(S): at 02:07

## 2024-10-24 RX ADMIN — OXYCODONE HYDROCHLORIDE 5 MILLIGRAM(S): 30 TABLET ORAL at 12:00

## 2024-10-24 RX ADMIN — Medication 40 MILLIGRAM(S): at 16:41

## 2024-10-24 RX ADMIN — Medication 2000 MILLIGRAM(S): at 06:02

## 2024-10-24 RX ADMIN — Medication 400 MILLIGRAM(S): at 01:14

## 2024-10-24 RX ADMIN — OXYCODONE HYDROCHLORIDE 5 MILLIGRAM(S): 30 TABLET ORAL at 11:08

## 2024-10-24 RX ADMIN — Medication 10 MILLIGRAM(S): at 09:09

## 2024-10-24 RX ADMIN — HYDROCORTISONE 50 MILLIGRAM(S): 10 TABLET ORAL at 16:41

## 2024-10-24 RX ADMIN — HYDRALAZINE HYDROCHLORIDE 10 MILLIGRAM(S): 50 TABLET, FILM COATED ORAL at 11:08

## 2024-10-24 RX ADMIN — Medication 10 MILLIGRAM(S): at 01:14

## 2024-10-24 RX ADMIN — POLYETHYLENE GLYCOL 3350 17 GRAM(S): 17 POWDER, FOR SOLUTION ORAL at 11:08

## 2024-10-24 RX ADMIN — Medication 40 MILLIEQUIVALENT(S): at 06:52

## 2024-10-24 RX ADMIN — PANTOPRAZOLE SODIUM 40 MILLIGRAM(S): 40 TABLET, DELAYED RELEASE ORAL at 07:46

## 2024-10-24 RX ADMIN — HYDROCORTISONE 50 MILLIGRAM(S): 10 TABLET ORAL at 06:02

## 2024-10-24 NOTE — PROGRESS NOTE ADULT - ASSESSMENT
58 yo F with hx of TSP for pituitary resection by Dr. Guevara in 2019 presents for blurry vision, she has been having blurry vision for a few weeks. Pt went to sight [facility] for eye exam due to blurry vision. They sent her for MRI which was preformed at a [facility] which showed increased size of pituitary macroadenoma with mass effect on optic chiasm., came in for admitted under Neurosurgery for further Management, imaging done here showed  Large mass in the sella and suprasellar cistern, with chiasmatic encroachment, over the course she has been complaining of intermittent chest pain, she has this chest pain for 3 weeks, no chest tenderness, no relation with exertion, EKG with no acute changes, trop 1st set done came negative too, TTE pending, medicine consulted for Medical Management.       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 Guevara and has been admitted to NSICU post op  - Elevated prolactin due to possible stalk effect due to size of adenoma, diluted prolactin 47 rules out severe prolactinemia  - S/p resection on 10/23  - On IV hydrocortisone 50mg q12 hr (initial hormonal work up shows normal AM Cortisol and ACTH)  - Check TT4, free thyroxine tomorrow per endocrine   - Monitor for diabetes insipidus, strict I&Os/BMP      # Intermittent chest pain:   EKG with no acute changes  trops negative  TTE done and is unremarkable  will continue to monitor  obtain lipid panel   PRN pain meds   could not give aspirin given upcoming surgery  would recommend cardiology consult as patient might require stress test before procedure.    # Prediabetes: Counselled for low carb diet.     # HLD: her LDL is 159, would start atorvastatin 20mg daily.     # Leukocytosis, Due to steroids- still 16- cont to trend- no fever no obvious source  - monitor for fever and trend WBC    # Heat intolerance possibly related to hyperprolactinemia.  TFT reviewed, normal.    # Hypertension- controlled   # Tachycardia  - cont low dose BB

## 2024-10-24 NOTE — PHYSICAL THERAPY INITIAL EVALUATION ADULT - WEIGHT-BEARING RESTRICTIONS: BED/CHAIR, REHAB EVAL
weight-bearing as tolerated Bilateral Helical Rim Advancement Flap Text: The defect edges were debeveled with a #15 blade scalpel.  Given the location of the defect and the proximity to free margins (helical rim) a bilateral helical rim advancement flap was deemed most appropriate.  Using a sterile surgical marker, the appropriate advancement flaps were drawn incorporating the defect and placing the expected incisions between the helical rim and antihelix where possible.  The area thus outlined was incised through and through with a #15 scalpel blade.  With a skin hook and iris scissors, the flaps were gently and sharply undermined and freed up.

## 2024-10-24 NOTE — PROGRESS NOTE ADULT - SUBJECTIVE AND OBJECTIVE BOX
INTERVAL EVENTS:  Follow up pituitary resection. Overall doing well, c/o fatigue. Denies headache, excessive thirst    MEDICATIONS  (STANDING):  enoxaparin Injectable 40 milliGRAM(s) SubCutaneous <User Schedule>  hydrocortisone sodium succinate Injectable 50 milliGRAM(s) IV Push once  pantoprazole    Tablet 40 milliGRAM(s) Oral before breakfast  polyethylene glycol 3350 17 Gram(s) Oral daily  senna 2 Tablet(s) Oral at bedtime  sodium chloride 0.9%. 1000 milliLiter(s) (70 mL/Hr) IV Continuous <Continuous>    MEDICATIONS  (PRN):  acetaminophen     Tablet .. 650 milliGRAM(s) Oral every 6 hours PRN Mild Pain (1 - 3)  hydrALAZINE Injectable 10 milliGRAM(s) IV Push every 2 hours PRN SBP > 160mm Hg  HYDROmorphone  Injectable 0.5 milliGRAM(s) IV Push every 6 hours PRN Severe Pain (7 - 10)  labetalol Injectable 10 milliGRAM(s) IV Push every 2 hours PRN Systolic blood pressure >160  ondansetron Injectable 4 milliGRAM(s) IV Push every 6 hours PRN Nausea and/or Vomiting  ondansetron Injectable 4 milliGRAM(s) IV Push once PRN Nausea and/or Vomiting  oxyCODONE    IR 10 milliGRAM(s) Oral every 4 hours PRN Severe Pain (7 - 10)  oxyCODONE    IR 5 milliGRAM(s) Oral every 4 hours PRN Moderate Pain (4 - 6)    Allergies  No Known Allergies    Vital Signs Last 24 Hrs  T(C): 36.9 (24 Oct 2024 11:21), Max: 36.9 (24 Oct 2024 11:21)  T(F): 98.4 (24 Oct 2024 11:21), Max: 98.4 (24 Oct 2024 11:21)  HR: 81 (24 Oct 2024 12:15) (70 - 118)  BP: 151/86 (24 Oct 2024 12:15) (123/73 - 186/95)  BP(mean): 106 (24 Oct 2024 12:15) (89 - 122)  RR: 20 (24 Oct 2024 12:15) (13 - 29)  SpO2: 95% (24 Oct 2024 12:15) (93% - 100%)    Parameters below as of 24 Oct 2024 12:00  Patient On (Oxygen Delivery Method): room air    PHYSICAL EXAM:  General: No apparent distress  Respiratory: Lungs clear bilaterally  Cardiac: +S1, S2, no m/r/g  GI: +BS, soft, non tender, non distended  Extremities: No peripheral edema  Neuro: A+O X3    LABS:                        13.3   16.60 )-----------( 145      ( 24 Oct 2024 04:25 )             39.7     10-24    137  |  105  |  12.1  ----------------------------<  124[H]  3.8   |  21.0[L]  |  0.68    Ca    9.0      24 Oct 2024 12:48  Phos  3.4     10-23  Mg     2.1     10-23      Urinalysis Basic - ( 24 Oct 2024 12:48 )    Color: x / Appearance: x / SG: x / pH: x  Gluc: 124 mg/dL / Ketone: x  / Bili: x / Urobili: x   Blood: x / Protein: x / Nitrite: x   Leuk Esterase: x / RBC: x / WBC x   Sq Epi: x / Non Sq Epi: x / Bacteria: x    POCT Blood Glucose.: 114 mg/dL (10-23-24 @ 20:40)  POCT Blood Glucose.: 140 mg/dL (10-23-24 @ 17:53)    Thyroid Stimulating Hormone, Serum: 0.21 uIU/mL (10-24-24 @ 04:25)  Free Thyroxine, Serum: 0.9 ng/dL (10-22-24 @ 04:52)  Thyroid Stimulating Hormone, Serum: 2.19 uIU/mL (10-19-24 @ 04:15)  Triiodothyronine, Total (T3 Total): 98 ng/dL (10-19-24 @ 04:15)

## 2024-10-24 NOTE — PROGRESS NOTE ADULT - ASSESSMENT
59F with PMHx transphenoidal pituitary resection by Dr. Guevara in 2019 presented with c/o visual disturbances. 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     1. Pituitary macroadenoma with mass effect on optic chiasm, s/p resection  - Elevated prolactin due to possible stalk effect due to size of adenoma, diluted prolactin 47 rules out severe prolactinemia  - S/p resection on 10/23  - On IV hydrocortisone 50mg q12 hr (initial hormonal work up shows normal AM Cortisol and ACTH)  - Check TT4, free thyroxine tomorrow  - Monitor for diabetes insipidus, strict I&Os/BMP

## 2024-10-24 NOTE — OCCUPATIONAL THERAPY INITIAL EVALUATION ADULT - RANGE OF MOTION EXAMINATION, UPPER EXTREMITY
pt moves bilat UEs grossly WFLs during functional tasks, but requires moderate prompting/bilateral UE Passive ROM was WFL  (within functional limits)

## 2024-10-24 NOTE — PHYSICAL THERAPY INITIAL EVALUATION ADULT - CRITERIA FOR SKILLED THERAPEUTIC INTERVENTIONS
home with assist, least restrictive device, Home PT/impairments found/anticipated discharge recommendation

## 2024-10-24 NOTE — PROGRESS NOTE ADULT - ASSESSMENT
59F known to NSG service, had transphenoidal resection 5 years ago, lost to follow up. Now presenting with recurrence, left visual loss progressive over several months now s/p transphenoidal resection of pituitary mass with Dr. Guevara on 10/23/24, POD#0.    Plan:  - Q1h neuro checks  - Normotension SBP   - MRI sella w w/o within 48h  - Pain control PRN, avoid oversedation  - Transphenoidal precautions: NO nose blowing, CPAP/BiPAP, nasal cannula, nasal swabs, NGT, straws or incentive spirometry; avoid straining  - Remove nasal packing in 48h after MRI completed   - Monitor for any signs of CSF leak  - HOB at least 45 degrees  - Ancef x 24hr  - DI watch: BMP q6h, UA q6, strict I&Os; mcgee in  - IVF until good PO intake  - Drink to thirst  - Endocrine labs pending, Endocrine to see today  - Solucortef taper  - GI prophylaxis as on steroids  - Bowel regimen: senna, miralax  - PT/OT   - DVT ppx: SCDs only, ok for Lovenox POD#1  - Further care per NSICU    To be discussed with Dr. Guevara during rounds

## 2024-10-24 NOTE — OCCUPATIONAL THERAPY INITIAL EVALUATION ADULT - LEVEL OF INDEPENDENCE: DRESS UPPER BODY, OT EVAL
to don gown secondary to fatigue/minimum assist (75% patients effort)/moderate assist (50% patients effort)

## 2024-10-24 NOTE — CHART NOTE - NSCHARTNOTEFT_GEN_A_CORE
DOWNGRADE NOTE  Accepting Physician: Dr Guevara  Service: Neurosurgery  Level of Care: SDU      Hospital Course:   60 yo F with hx of TSP for pituitary resection by Dr. Guevara in 2019 presents for blurry vision. Pt has been having blurry vision for a few weeks. Pt went to sight [facility] for eye exam due to blurry vision. They sent her for MRI which was preformed at a [facility] which showed increased size of pituitary macroadenoma with mass effect on optic chiasm. Pt did not have Dr. Guevara's info so presented to [facility]. Pt transferred to [facility].   Pt denies HA, SOB, palpitations.  (18 Oct 2024 19:16)      INTERVAL HPI/OVERNIGHT EVENTS:  Pt is now POD 1 from transphenoidal resection of pituitary mass with Dr. Guevara and has been admitted to NSICU post op. Pt is doing well with minimal complaints. TOV       Vital Signs Last 24 Hrs  T(C): 36.6 (24 Oct 2024 07:52), Max: 36.8 (23 Oct 2024 20:35)  T(F): 97.9 (24 Oct 2024 07:52), Max: 98.3 (23 Oct 2024 20:35)  HR: 71 (24 Oct 2024 10:00) (71 - 130)  BP: 160/86 (24 Oct 2024 10:00) (123/73 - 186/95)  BP(mean): 108 (24 Oct 2024 10:00) (89 - 122)  RR: 13 (24 Oct 2024 10:00) (13 - 29)  SpO2: 98% (24 Oct 2024 10:00) (93% - 100%)    Parameters below as of 24 Oct 2024 10:00  Patient On (Oxygen Delivery Method): room air      I&O's Detail    23 Oct 2024 07:01  -  24 Oct 2024 07:00  --------------------------------------------------------  IN:    IV PiggyBack: 100 mL    Oral Fluid: 50 mL    sodium chloride 0.9%: 1050 mL  Total IN: 1200 mL    OUT:    Indwelling Catheter - Urethral (mL): 920 mL  Total OUT: 920 mL    Total NET: 280 mL      24 Oct 2024 07:01  -  24 Oct 2024 10:24  --------------------------------------------------------  IN:    sodium chloride 0.9%: 210 mL  Total IN: 210 mL    OUT:    Indwelling Catheter - Urethral (mL): 300 mL  Total OUT: 300 mL    Total NET: -90 mL        I&O's Summary    23 Oct 2024 07:01  -  24 Oct 2024 07:00  --------------------------------------------------------  IN: 1200 mL / OUT: 920 mL / NET: 280 mL    24 Oct 2024 07:01  -  24 Oct 2024 10:24  --------------------------------------------------------  IN: 210 mL / OUT: 300 mL / NET: -90 mL        PHYSICAL EXAM:  GENERAL: NAD  HEAD: Atraumatic, normocephalic  WOUND: Nasal packing clean dry intact  MENTAL STATUS: AAOx3; awake; opens eyes spontaneously; appropriately conversant without aphasia; following simple commands  CRANIAL NERVES: + L eye temporal inferior peripheral field cut. PERRL. EOMI without nystagmus. Facial sensation intact V1-3 distribution b/l. Face symmetric w/ normal eye closure and smile, tongue midline. Hearing grossly intact. Speech clear. Head turning and shoulder shrug intact.   MOTOR: Strength 5/5 b/l upper and lower extremities  SENSATION: Grossly intact to light touch all extremities  CHEST/LUNG: Non-labored breathing on room air  SKIN: Warm, dry    DIET:  [x] Regular, CCD, DASH  [] NPO  [] Mechanical  [] Tube feeds    LABS:                        13.3   16.60 )-----------( 145      ( 24 Oct 2024 04:25 )             39.7     10-24    137  |  104  |  13.4  ----------------------------<  117[H]  4.3   |  21.0[L]  |  0.71    Ca    8.4      24 Oct 2024 07:20  Phos  3.4     10-23  Mg     2.1     10-23        Urinalysis Basic - ( 24 Oct 2024 07:20 )    Color: Yellow / Appearance: Clear / S.018 / pH: x  Gluc: 117 mg/dL / Ketone: Negative mg/dL  / Bili: Negative / Urobili: 0.2 mg/dL   Blood: x / Protein: Negative mg/dL / Nitrite: Negative   Leuk Esterase: Negative / RBC: x / WBC x   Sq Epi: x / Non Sq Epi: x / Bacteria: x          CAPILLARY BLOOD GLUCOSE      POCT Blood Glucose.: 114 mg/dL (23 Oct 2024 20:40)  POCT Blood Glucose.: 140 mg/dL (23 Oct 2024 17:53)  POCT Blood Glucose.: 150 mg/dL (23 Oct 2024 11:41)  POCT Blood Glucose.: 130 mg/dL (23 Oct 2024 11:11)      Drug Levels: [] N/A    CSF Analysis: [] N/A      Allergies    No Known Allergies    Intolerances      MEDICATIONS:  Antibiotics:    Neuro:  acetaminophen     Tablet .. 650 milliGRAM(s) Oral every 6 hours PRN  HYDROmorphone  Injectable 0.5 milliGRAM(s) IV Push every 6 hours PRN  ondansetron Injectable 4 milliGRAM(s) IV Push once PRN  ondansetron Injectable 4 milliGRAM(s) IV Push every 6 hours PRN  oxyCODONE    IR 10 milliGRAM(s) Oral every 4 hours PRN  oxyCODONE    IR 5 milliGRAM(s) Oral every 4 hours PRN    Anticoagulation:  enoxaparin Injectable 40 milliGRAM(s) SubCutaneous <User Schedule>    OTHER:  hydrALAZINE Injectable 10 milliGRAM(s) IV Push every 2 hours PRN  hydrocortisone sodium succinate Injectable 50 milliGRAM(s) IV Push once  labetalol Injectable 10 milliGRAM(s) IV Push every 2 hours PRN  pantoprazole    Tablet 40 milliGRAM(s) Oral before breakfast  polyethylene glycol 3350 17 Gram(s) Oral daily  senna 2 Tablet(s) Oral at bedtime    IVF:  sodium chloride 0.9%. 1000 milliLiter(s) IV Continuous <Continuous>    CULTURES:    RADIOLOGY & ADDITIONAL TESTS:    < from: MR Brain Stereotactic w/wo IV Cont (10.18.24 @ 23:33) >    IMPRESSION:    1.  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        Patient Name: FERNANDO DOMINGUEZ  MRN: [id number], : 61      --- End of Report ---            HAILEY CHANG MD; Attending Radiologist  This document has been electronically signed. Oct 19 2024 12:41AM    < end of copied text >        ASSESSMENT:  59F known to [facility] service, had transphenoidal resection 5 years ago, lost to follow up. Now presenting with recurrence, left visual loss progressive over several months now s/p transphenoidal resection of pituitary mass with Dr. Guevara on 10/23/24, POD#1.    Plan:  - Q2h neuro checks  - Normotension SBP   - MRI sella w w/o within 48h  - Pain control PRN, avoid oversedation  - Transphenoidal precautions: NO nose blowing, CPAP/BiPAP, nasal cannula, nasal swabs, NGT, straws or incentive spirometry; avoid straining  - Remove nasal packing and place a gauze mustache  - Monitor for any signs of CSF leak  - HOB at least 45 degrees  - DI watch: BMP q6h  - IVF NS @ 70  - Drink to thirst  - Endocrine labs pending, Endocrine consulted  - Solucortef taper  - GI prophylaxis as on steroids  - Bowel regimen: senna, miralax  - PT/OT   - DVT ppx: SCDs, SQL

## 2024-10-24 NOTE — PROGRESS NOTE ADULT - SUBJECTIVE AND OBJECTIVE BOX
FERNANDO DOMINGUEZ Patient is a 59y old  Female who presents with a chief complaint of Pituitary Mass (24 Oct 2024 13:19)     HPI:  60 yo F with hx of TSP for pituitary resection by Dr. Guevara in 2019 presents for blurry vision. Pt has been having blurry vision for a few weeks. Pt went to sight [facility] for eye exam due to blurry vision. They sent her for MRI which was preformed at a [facility] which showed increased size of pituitary macroadenoma with mass effect on optic chiasm. Pt did not have Dr. Guevara's info so presented to [facility]. Pt transferred to [facility].   Pt denies HA, SOB, palpitations.  (18 Oct 2024 19:16)    The patient was seen and evaluated offers no new complaints - except for has flushed red face - and dressing under the nose over the lips, also complains of pain- BP- 151/96- daughter at bedside states she was better and then started getting red hot   The patient is in no acute distress.  Denied any fever abdominal pain, fever, dysuria, cough, edema       Height (cm): 157.5 (10-23 @ 20:35)  Weight (kg): 79.6 (10-23 @ 20:35)  BMI (kg/m2): 32.1 (10-23 @ 20:35)  BSA (m2): 1.81 (10-23 @ 20:35)I&O's Summary    23 Oct 2024 07:  -  24 Oct 2024 07:00  --------------------------------------------------------  IN: 1200 mL / OUT: 920 mL / NET: 280 mL    24 Oct 2024 07:  -  24 Oct 2024 15:40  --------------------------------------------------------  IN: 1080 mL / OUT: 300 mL / NET: 780 mL      Allergies    No Known Allergies    Intolerances      HEALTH ISSUES - PROBLEM Dx:  Pre-operative cardiovascular examination          PAST MEDICAL & SURGICAL HISTORY:  History of pituitary surgery      S/P  section      History of pituitary tumor              Vital Signs Last 24 Hrs  T(C): 36.9 (24 Oct 2024 11:21), Max: 36.9 (24 Oct 2024 11:21)  T(F): 98.4 (24 Oct 2024 11:21), Max: 98.4 (24 Oct 2024 11:21)  HR: 74 (24 Oct 2024 14:00) (70 - 118)  BP: 114/96 (24 Oct 2024 14:00) (114/96 - 186/95)  BP(mean): 103 (24 Oct 2024 14:00) (89 - 122)  RR: 23 (24 Oct 2024 14:00) (13 - 29)  SpO2: 98% (24 Oct 2024 14:00) (93% - 100%)    Parameters below as of 24 Oct 2024 14:00  Patient On (Oxygen Delivery Method): room air    T(C): 36.9 (10--24 @ 11:21), Max: 36.9 (10-24-24 @ 11:21)  HR: 74 (10-24-24 @ 14:00) (70 - 118)  BP: 114/96 (10-24-24 @ 14:00) (114/96 - 186/95)  RR: 23 (10-24-24 @ 14:00) (13 - 29)  SpO2: 98% (10--24 @ 14:00) (93% - 100%)  Wt(kg): --    PHYSICAL EXAM:    GENERAL: NAD in pain   HEAD:  Atraumatic, Normocephalic  EYES: Visual field loss bilateral peripheries, conjunctiva and sclera clear  ENMT:  nasal packing+  NERVOUS SYSTEM:  Alert &   Moves upper and lower extremities; CNS-II-XII  CHEST/LUNG: Clear to auscultation bilaterally;  HEART: Regular rate and rhythm; No murmurs,   ABDOMEN: Soft, Nontender, Nondistended; Bowel sounds present  EXTREMITIES:  Peripheral Pulses,   psychiatry- mood and affect OK    acetaminophen     Tablet .. 650 milliGRAM(s) Oral every 6 hours PRN  enoxaparin Injectable 40 milliGRAM(s) SubCutaneous <User Schedule>  hydrALAZINE Injectable 10 milliGRAM(s) IV Push every 2 hours PRN  hydrocortisone sodium succinate Injectable 50 milliGRAM(s) IV Push once  HYDROmorphone  Injectable 0.5 milliGRAM(s) IV Push every 6 hours PRN  labetalol Injectable 10 milliGRAM(s) IV Push every 2 hours PRN  ondansetron Injectable 4 milliGRAM(s) IV Push once PRN  ondansetron Injectable 4 milliGRAM(s) IV Push every 6 hours PRN  oxyCODONE    IR 10 milliGRAM(s) Oral every 4 hours PRN  oxyCODONE    IR 5 milliGRAM(s) Oral every 4 hours PRN  pantoprazole    Tablet 40 milliGRAM(s) Oral before breakfast  polyethylene glycol 3350 17 Gram(s) Oral daily  senna 2 Tablet(s) Oral at bedtime  sodium chloride 0.9%. 1000 milliLiter(s) IV Continuous <Continuous>      LABS:                          13.3   16.60 )-----------( 145      ( 24 Oct 2024 04:25 )             39.7     10-24    137  |  105  |  12.1  ----------------------------<  124[H]  3.8   |  21.0[L]  |  0.68    Ca    9.0      24 Oct 2024 12:48  Phos  3.4     10-  Mg     2.1     10              Urinalysis Basic - ( 24 Oct 2024 12:48 )    Color: x / Appearance: x / SG: x / pH: x  Gluc: 124 mg/dL / Ketone: x  / Bili: x / Urobili: x   Blood: x / Protein: x / Nitrite: x   Leuk Esterase: x / RBC: x / WBC x   Sq Epi: x / Non Sq Epi: x / Bacteria: x      CAPILLARY BLOOD GLUCOSE      POCT Blood Glucose.: 114 mg/dL (23 Oct 2024 20:40)  POCT Blood Glucose.: 140 mg/dL (23 Oct 2024 17:53)      RADIOLOGY & ADDITIONAL TESTS:      Consultant notes reviewed  I independently reviwed all images/ labarotory results /cultures/ telemetry/EKG and my findings are indicated above  and discussed care plan with consultants/nursing/ family /patient

## 2024-10-24 NOTE — OCCUPATIONAL THERAPY INITIAL EVALUATION ADULT - GENERAL OBSERVATIONS, REHAB EVAL
Fax received from [address] [place] [address] for 90 day supply of Ezetimibe  +IV, +cardiac monitor

## 2024-10-24 NOTE — PROGRESS NOTE ADULT - SUBJECTIVE AND OBJECTIVE BOX
HPI:  60 yo F with hx of TSP for pituitary resection by Dr. Guevara in 2019 presents for blurry vision. Pt has been having blurry vision for a few weeks. Pt went to sight [facility] for eye exam due to blurry vision. 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 Pt did not have Dr. Guevara's info so presented to [facility]. Pt transferred to [facility].   Pt denies HA, SOB, palpitations.  (18 Oct 2024 19:16)      INTERVAL HPI/OVERNIGHT EVENTS:  Pt is now POD 1 from transphenoidal resection of pituitary mass with Dr. Guevara and has been admitted to NSICU post op. Pt is doing well with minimal complaints. Has mcgee and is making urine, has not tolerated diet yet.     Vital Signs Last 24 Hrs  T(C): 36.5 (24 Oct 2024 00:11), Max: 36.8 (23 Oct 2024 07:38)  T(F): 97.7 (24 Oct 2024 00:11), Max: 98.3 (23 Oct 2024 07:38)  HR: 101 (23 Oct 2024 23:00) (54 - 130)  BP: 139/79 (23 Oct 2024 23:00) (131/84 - 170/94)  BP(mean): 97 (23 Oct 2024 23:00) (93 - 115)  RR: 30 (23 Oct 2024 23:00) (16 - 30)  SpO2: 95% (23 Oct 2024 23:00) (95% - 100%)    Parameters below as of 23 Oct 2024 20:35  Patient On (Oxygen Delivery Method): room air    PHYSICAL EXAM:  GENERAL: NAD  HEAD: Atraumatic, normocephalic  WOUND: Nasal packing clean dry intact  MENTAL STATUS: AAOx3; awake; opens eyes spontaneously; appropriately conversant without aphasia; following simple commands  CRANIAL NERVES: + L eye outer peripheral field cut. PERRL. EOMI without nystagmus. Facial sensation intact V1-3 distribution b/l. Face symmetric w/ normal eye closure and smile, tongue midline. Hearing grossly intact. Speech clear. Head turning and shoulder shrug intact.   MOTOR: Strength 5/5 b/l upper and lower extremities  SENSATION: Grossly intact to light touch all extremities  CHEST/LUNG: Non-labored breathing on room air  SKIN: Warm, dry    LABS:                        14.2   17.45 )-----------( 211      ( 23 Oct 2024 17:58 )             41.4     10-23    137  |  103  |  14.4  ----------------------------<  153[H]  3.8   |  21.0[L]  |  0.71    Ca    8.5      23 Oct 2024 17:58  Phos  3.4     10-23  Mg     2.1     10-23        Urinalysis Basic - ( 24 Oct 2024 00:45 )    Color: Yellow / Appearance: Clear / SG: >1.030 / pH: x  Gluc: x / Ketone: Negative mg/dL  / Bili: Negative / Urobili: 1.0 mg/dL   Blood: x / Protein: 30 mg/dL / Nitrite: Negative   Leuk Esterase: Negative / RBC: 41 /HPF / WBC 5 /HPF   Sq Epi: x / Non Sq Epi: 2 /HPF / Bacteria: Negative /HPF        10-22 @ 07:01  -  10-23 @ 07:00  --------------------------------------------------------  IN: 1680 mL / OUT: 0 mL / NET: 1680 mL    10-23 @ 07:01  -  10-24 @ 01:02  --------------------------------------------------------  IN: 540 mL / OUT: 465 mL / NET: 75 mL        RADIOLOGY & ADDITIONAL TESTS:  Post Op MRI pending     < from: MR Brain Stereotactic w/wo IV Cont (10.18.24 @ 23:33) >  IMPRESSION:    1.  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    < end of copied text >

## 2024-10-24 NOTE — PHYSICAL THERAPY INITIAL EVALUATION ADULT - PERTINENT HX OF CURRENT PROBLEM, REHAB EVAL
59F known to [facility] service, had transphenoidal resection 5 years ago, lost to follow up. Now presenting with recurrence, left visual loss progressive over several months now s/p transphenoidal resection of pituitary mass with Dr. Guevara on 10/23/24,

## 2024-10-24 NOTE — PHYSICAL THERAPY INITIAL EVALUATION ADULT - ADDITIONAL COMMENTS
Pt lives in a private home with her spouse (unable to assist 2*2 current cancer diagnosis) no steps to enter, pt has a ramp and no steps inside. Pt was independent PTA without an assist device. Pt owns a RW, SAC and commode. Pt has a daughter and son in law nearby that are supportive.

## 2024-10-24 NOTE — PHYSICAL THERAPY INITIAL EVALUATION ADULT - BALANCE DISTURBANCE, SYSTEM IMPAIRMENT CONTRIBUTE, REHAB EVAL
musculoskeletal How Severe Are They?: moderate Is This A New Presentation, Or A Follow-Up?: Skin Lesions

## 2024-10-25 DIAGNOSIS — R55 SYNCOPE AND COLLAPSE: ICD-10-CM

## 2024-10-25 PROBLEM — Z98.890 OTHER SPECIFIED POSTPROCEDURAL STATES: Chronic | Status: ACTIVE | Noted: 2024-10-18

## 2024-10-25 LAB
ANION GAP SERPL CALC-SCNC: 14 MMOL/L — SIGNIFICANT CHANGE UP (ref 5–17)
BUN SERPL-MCNC: 17.4 MG/DL — SIGNIFICANT CHANGE UP (ref 8–20)
CALCIUM SERPL-MCNC: 8.3 MG/DL — LOW (ref 8.4–10.5)
CHLORIDE SERPL-SCNC: 106 MMOL/L — SIGNIFICANT CHANGE UP (ref 96–108)
CO2 SERPL-SCNC: 21 MMOL/L — LOW (ref 22–29)
CREAT SERPL-MCNC: 0.9 MG/DL — SIGNIFICANT CHANGE UP (ref 0.5–1.3)
EGFR: 73 ML/MIN/1.73M2 — SIGNIFICANT CHANGE UP
GLUCOSE SERPL-MCNC: 127 MG/DL — HIGH (ref 70–99)
HCT VFR BLD CALC: 41.3 % — SIGNIFICANT CHANGE UP (ref 34.5–45)
HGB BLD-MCNC: 13.4 G/DL — SIGNIFICANT CHANGE UP (ref 11.5–15.5)
MAGNESIUM SERPL-MCNC: 2.3 MG/DL — SIGNIFICANT CHANGE UP (ref 1.6–2.6)
MCHC RBC-ENTMCNC: 29.8 PG — SIGNIFICANT CHANGE UP (ref 27–34)
MCHC RBC-ENTMCNC: 32.4 GM/DL — SIGNIFICANT CHANGE UP (ref 32–36)
MCV RBC AUTO: 91.8 FL — SIGNIFICANT CHANGE UP (ref 80–100)
PHOSPHATE SERPL-MCNC: 2.4 MG/DL — SIGNIFICANT CHANGE UP (ref 2.4–4.7)
PLATELET # BLD AUTO: 196 K/UL — SIGNIFICANT CHANGE UP (ref 150–400)
POTASSIUM SERPL-MCNC: 3.5 MMOL/L — SIGNIFICANT CHANGE UP (ref 3.5–5.3)
POTASSIUM SERPL-SCNC: 3.5 MMOL/L — SIGNIFICANT CHANGE UP (ref 3.5–5.3)
RBC # BLD: 4.5 M/UL — SIGNIFICANT CHANGE UP (ref 3.8–5.2)
RBC # FLD: 14.1 % — SIGNIFICANT CHANGE UP (ref 10.3–14.5)
SODIUM SERPL-SCNC: 140 MMOL/L — SIGNIFICANT CHANGE UP (ref 135–145)
T4 AB SER-ACNC: 5.8 UG/DL — SIGNIFICANT CHANGE UP (ref 4.5–12)
T4 FREE SERPL-MCNC: 1.2 NG/DL — SIGNIFICANT CHANGE UP (ref 0.9–1.8)
WBC # BLD: 13.46 K/UL — HIGH (ref 3.8–10.5)
WBC # FLD AUTO: 13.46 K/UL — HIGH (ref 3.8–10.5)

## 2024-10-25 PROCEDURE — 99232 SBSQ HOSP IP/OBS MODERATE 35: CPT

## 2024-10-25 PROCEDURE — 99223 1ST HOSP IP/OBS HIGH 75: CPT

## 2024-10-25 PROCEDURE — 93010 ELECTROCARDIOGRAM REPORT: CPT

## 2024-10-25 RX ORDER — CALCIUM CARBONATE 215(500)MG
1 TABLET,CHEWABLE ORAL
Refills: 0 | Status: DISCONTINUED | OUTPATIENT
Start: 2024-10-25 | End: 2024-10-28

## 2024-10-25 RX ORDER — HYDROCORTISONE 10 MG/1
1 TABLET ORAL
Qty: 30 | Refills: 0
Start: 2024-10-25 | End: 2024-11-23

## 2024-10-25 RX ORDER — HYDROCORTISONE 10 MG/1
1 TABLET ORAL
Qty: 60 | Refills: 0
Start: 2024-10-25 | End: 2024-11-23

## 2024-10-25 RX ORDER — LEVOTHYROXINE SODIUM 88 MCG
1 TABLET ORAL
Qty: 30 | Refills: 0
Start: 2024-10-25 | End: 2024-11-23

## 2024-10-25 RX ORDER — LEVOTHYROXINE SODIUM 88 MCG
75 TABLET ORAL DAILY
Refills: 0 | Status: DISCONTINUED | OUTPATIENT
Start: 2024-10-25 | End: 2024-10-27

## 2024-10-25 RX ADMIN — Medication 1 SPRAY(S): at 17:13

## 2024-10-25 RX ADMIN — PANTOPRAZOLE SODIUM 40 MILLIGRAM(S): 40 TABLET, DELAYED RELEASE ORAL at 05:04

## 2024-10-25 RX ADMIN — Medication 2 TABLET(S): at 21:10

## 2024-10-25 RX ADMIN — Medication 40 MILLIGRAM(S): at 17:14

## 2024-10-25 RX ADMIN — Medication 10 MILLIGRAM(S): at 21:30

## 2024-10-25 RX ADMIN — HYDROCORTISONE 25 MILLIGRAM(S): 10 TABLET ORAL at 17:14

## 2024-10-25 RX ADMIN — Medication 1 SPRAY(S): at 13:08

## 2024-10-25 RX ADMIN — HYDROCORTISONE 25 MILLIGRAM(S): 10 TABLET ORAL at 05:03

## 2024-10-25 RX ADMIN — POLYETHYLENE GLYCOL 3350 17 GRAM(S): 17 POWDER, FOR SOLUTION ORAL at 13:08

## 2024-10-25 NOTE — CHART NOTE - NSCHARTNOTEFT_GEN_A_CORE
Patient was seen and evaluated by NSICU in SDU for continuity of care. Patient's is neurologically and hemodynamically stable. NSICU will sign off, reconsult as needed.

## 2024-10-25 NOTE — DISCHARGE NOTE PROVIDER - NSDCCPCAREPLAN_GEN_ALL_CORE_FT
PRINCIPAL DISCHARGE DIAGNOSIS  Diagnosis: Pituitary tumor  Assessment and Plan of Treatment: You have had a surgery that had a transsphenoidal approach through your nose. Due to this approach and it's close proximity to the brain, you should avoid using straws, incentive spirometers, nasal cannula, CPAP/BiPAP/High flow oxygenation, nasogastric tube placements, nose blowing, and nasal swabbing (including COVID testing). Any of the aforementioned can cause you to have a cerebrospinal fluid leak. Monitor for persistent leaking from your nose or the feeling of a persistent postnasal drip. Please use ocean nasal spray four times per day for nasal congestion.  Pain: It is common to have a headache or incisional pain after surgery. You may take over the counter Tylenol. Use stool softeners or mild laxative as needed.   Activity: Avoid straining, heavy lifting (objects greater than 10 pounds), strenuous activity, twisting, bending, and vigorous exercise. You should not drive or operate machinery until cleared by your neurosurgeon.   Medication: You have been started on a new medication called Levothryoxine 75mg for hyperthyroidism and steroids (30mg AM|20mg PM). Please follow up with Endocrinology on Nov, 19, 2024 @ 11: 40 AM. Address: [address] [place] [address]  Please make an appointment for follow up with neurosurgeon Dr. Shar Guevara's office in 1-2 weeks for wound check. Call [phone] to schedule an appointment. Ok to shower but do not allow water to hit incision site directly. Gently pat dry after shower.  Should you develop any new or worsening neurologic symptoms or have concern about your incision, please call our office immediately or visit the emergency department.

## 2024-10-25 NOTE — PROGRESS NOTE ADULT - SUBJECTIVE AND OBJECTIVE BOX
[facility]                                                         CARDIOLOGY AT [place] [address]                                                         Telephone: [phone]. Fax:[phone]                                                                             PROGRESS NOTE    Reason for follow up: Syncopal Episode  Update: 59 y.o. female with hx of TSP presented with blurry vision for a few weeks. 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 Pt is now s/p pituitary resection performed 10/23/24. Today, pt was being assisted by RNs to the bathroom; when she stood up from chair she became lightheaded and dizzy. As per RN, pt was guided back to chair and after 5-10 seconds pt returned to baseline mental status. Pt states she is still feeling weak at this time. Pt on telemetry during incident, bradycardia noted HR 40s at that time.       Review of symptoms:   Cardiac: +Lightheaded. +Dizziness. No chest pain. No dyspnea. No palpitations.  Respiratory: no cough. No dyspnea  Gastrointestinal: No diarrhea. No abdominal pain. No bleeding.   Neuro: No focal neuro complaints.    Vitals:  T(C): 36.5 (10-25-24 @ 08:01), Max: 37.2 (10-24-24 @ 19:50)  HR: 106 (10-25-24 @ 14:00) (47 - 106)  BP: 154/83 (10-25-24 @ 14:00) (113/61 - 154/83)  RR: 20 (10-25-24 @ 14:00) (12 - 25)  SpO2: 99% (10-25-24 @ 14:00) (93% - 100%)  Wt(kg): --  I&O's Summary    24 Oct 2024 07:01  -  25 Oct 2024 07:00  --------------------------------------------------------  IN: 1380 mL / OUT: 300 mL / NET: 1080 mL      Weight (kg): 79.6 (10-23 @ 20:35)    PHYSICAL EXAM:  Appearance: Comfortable. No acute distress  HEENT: Gauze & tape under nose s/p pituitary resection. Normocephalic.  Normal oral mucosa  Neurologic: A & O x 3, no gross focal deficits.  Cardiovascular: RRR S1 S2, No murmur, no rubs/gallops. No JVD  Respiratory: Lungs clear to auscultation, unlabored   Gastrointestinal:  Soft, Non-tender, + BS  Lower Extremities: 2+ Peripheral Pulses, No clubbing, cyanosis, or edema  Psychiatry: Patient is calm. No agitation.   Skin: Warm and dry.    CURRENT CARDIAC MEDICATIONS:  hydrALAZINE Injectable 10 milliGRAM(s) IV Push every 2 hours PRN  labetalol Injectable 10 milliGRAM(s) IV Push every 2 hours PRN      CURRENT OTHER MEDICATIONS:  acetaminophen     Tablet .. 650 milliGRAM(s) Oral every 6 hours PRN Mild Pain (1 - 3)  HYDROmorphone  Injectable 0.5 milliGRAM(s) IV Push every 6 hours PRN Severe Pain (7 - 10)  ondansetron Injectable 4 milliGRAM(s) IV Push once PRN Nausea and/or Vomiting  ondansetron Injectable 4 milliGRAM(s) IV Push every 6 hours PRN Nausea and/or Vomiting  oxyCODONE    IR 10 milliGRAM(s) Oral every 4 hours PRN Severe Pain (7 - 10)  oxyCODONE    IR 5 milliGRAM(s) Oral every 4 hours PRN Moderate Pain (4 - 6)  pantoprazole    Tablet 40 milliGRAM(s) Oral before breakfast  polyethylene glycol 3350 17 Gram(s) Oral daily  senna 2 Tablet(s) Oral at bedtime  hydrocortisone sodium succinate Injectable 25 milliGRAM(s) IV Push every 12 hours, Stop order after: 1 Days  levothyroxine 75 MICROGram(s) Oral daily  enoxaparin Injectable 40 milliGRAM(s) SubCutaneous <User Schedule>  sodium chloride 0.65% Nasal 1 Spray(s) Both Nostrils four times a day  sodium chloride 0.9%. 1000 milliLiter(s) (70 mL/Hr) IV Continuous <Continuous>      LABS:	 	                       13.4   13.46 )-----------( 196      ( 25 Oct 2024 05:38 )             41.3     10-25    140  |  106  |  17.4  ----------------------------<  127[H]  3.5   |  21.0[L]  |  0.90    Ca    8.3[L]      25 Oct 2024 05:38  Phos  2.4     10-25  Mg     2.3     10-25      PT/INR/PTT ( 18 Oct 2024 17:47 )                       :                       :      12.2         :       29.9                  .        .                   .              .           .       1.08        .                                       Lipid Profile: Date: 10-20 @ 04:06  Total cholesterol 221; Direct LDL: --; HDL: 43; Triglycerides:95    HgA1c:   TSH: Thyroid Stimulating Hormone, Serum: 0.21 uIU/mL  Thyroid Stimulating Hormone, Serum: 2.19 uIU/mL      TELEMETRY: Episode of bradycardia to the 40s and tachycardia to 130s  ECG:    DIAGNOSTIC TESTING:  [ x ] Echocardiogram:     < from: TTE W or WO Ultrasound Enhancing Agent (10.19.24 @ 09:38) >  CONCLUSIONS:      1. Technically difficult image quality.   2. Left ventricular cavity is normal in size. Left ventricular wall thickness is normal. Left ventricular systolic function is normal with an ejection fraction visually estimated at 65 to 70 %. There is poor visualization of the endocardial borders to determine the presence of wall motion abnormalities.   3. Normal left ventricular diastolic function, with normal left ventricular filling pressure.   4. Probably normal right ventricular cavity size and probably normal right ventricular systolic function.   5. Normal left and right atrial size.   6. Trileaflet aortic valve with normal systolic excursion. Fibrocalcific aortic valve sclerosis without stenosis.   7. No pericardial effusion seen.   8. No prior echocardiogram is available for comparison.    < end of copied text >    [ x ] Stress Test:    < from: Nuclear Stress Test-Exercise.. (10.21.24 @ 08:10) >  Conclusions:   1. NORMAL STUDY   2. Patient achieved 7.0 METS, which is consistent with average exercise capacity. 103% MPHR.   3. No cardiac symptoms. No ischemicECG changes.   4. High treadmill score = 5 (Low risk)   5. Normal myocardial perfusion scan, with no evidence of infarction or inducible ischemia.   6. The left ventricle is normal in function. The post stress left ventricular EF is 97 %.      < end of copied text >

## 2024-10-25 NOTE — DISCHARGE NOTE PROVIDER - HOSPITAL COURSE
59 yo F with PMHx of transphenoidal 61 yo F with PMHx of transphenoidal resection for pituitary tumor in 2019 by Dr. Romano transfer from [facility] presenting with Blurry vision. 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 Patient underwent transphenoidal resection for pituitary adenoma on 10/23, case uncomplicated and admitted to NeuroICU for neuromonitoring post-op. Nueroicu course uncomplicateted, downgraded to neurosurgery step down unit on 10/24. Mr brain w/ and w/o contrast revealed expected post operative changes in sellar region with residual neoplasm. Patient complains of postoperative nasal congestion, xeroform 61 yo F with PMHx of transphenoidal resection for pituitary tumor in 2019 by Dr. Romano transfer from [facility] presenting with Blurry vision. 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 Patient underwent transphenoidal resection for pituitary adenoma on 10/23, case uncomplicated and admitted to NeuroICU for neuromonitoring post-op. Nueroicu course uncomplicated, downgraded to neurosurgery step down unit on 10/24. Mr brain w/ and w/o contrast revealed expected post operative changes in sellar region with residual neoplasm. Patient states the vision is steadily improving. She complains of postoperative nasal congestion, xeroform removed, educated patient regarding sodium chloride ocean spray use to improve congestion. Patient will be transitioned to Oral steroids and will be started on levothryoxine and follow up with endocrine outpatient on Nov 19, 11:40 AM . Will also follow up with Dr. Romano in 2 weeks post op. patient expressed understanding and had no further questions. 59 yo F with PMHx of transphenoidal resection for pituitary tumor in 2019 by Dr. Romano transfer from [facility] presenting with Blurry vision. 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 Patient underwent transphenoidal resection for pituitary adenoma on 10/23, case uncomplicated and admitted to NeuroICU for neuromonitoring post-op. Nueroicu course uncomplicated, downgraded to neurosurgery step down unit on 10/24. Mr brain w/ and w/o contrast revealed expected post operative changes in sellar region with residual neoplasm. Patient states the vision is steadily improving. She complains of postoperative nasal congestion, xeroform removed, educated patient regarding sodium chloride ocean spray use to improve congestion. Patient will be transitioned to Oral steroids and will be started on levothryoxine and follow up with endocrine outpatient on Nov 19, 11:40 AM . patient received ILR placement on 10/28, no documented complications, recommends follow up with EP in 2-3 weeks. Will also follow up with Dr. Romano in 1 week post op. patient expressed understanding and had no further questions.

## 2024-10-25 NOTE — PROGRESS NOTE ADULT - ASSESSMENT
58 yo F with hx of TSP for pituitary resection by Dr. Guevara in 2019 presents for blurry vision, she has been having blurry vision for a few weeks. Pt went to sight MD for eye exam due to blurry vision. 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     Presyncope/Syncope - bradycardia in 40s  Earlier today she stood up from chair she became lightheaded and dizzy.    on telemetry during incident, bradycardia noted HR 40s at that time.   DW cardiology- telemetry, possible junctional rhythm at the time of syncopal episode HR 40s   - Orthostatic vital signs WNL  - TTE performed 10/19/24 unremarkable, LVEF 65 - 70%  - NST performed 10/21/24 without evidence of infarction or ischemia  - Continuous telemetry for acute arrhythmia monitoring  - EP consulted for further management of arrhythmia.    # Blurry Vission/  Large mass in the sella and suprasellar cistern, with chiasmatic encroachment:   # Pituitary Macroadenoma with  mass effect on optic chiasm, s/p resection  POD# 2 from transphenoidal resection of pituitary mass with Dr. Guevara and has been admitted to NSICU post op  - Elevated prolactin due to possible stalk effect due to size of adenoma, diluted prolactin 47 rules out severe prolactinemia  - S/p resection on 10/23  - On IV hydrocortisone 50mg q12 hr (initial hormonal work up shows normal AM Cortisol and ACTH)- change to oral dose per endocrine recommendations   - normalT4, - Synthroid started - DW endocrine  - Monitor for diabetes insipidus, strict I&Os/ BMP      # Intermittent chest pain:   EKG with no acute changes  trops negative  TTE done and is unremarkable  obtain lipid panel   PRN pain meds   could not give aspirin given upcoming surgery  would recommend cardiology consult as patient might require stress test before procedure.    # Prediabetes: Counselled for low carb diet.     # HLD: her LDL is 159, would start atorvastatin 20mg daily.     # Leukocytosis, Due to steroids- still 16- cont to trend- no fever no obvious source  - monitor for fever and trend WBC    # Heat intolerance possibly related to hyperprolactinemia.  TFT reviewed, normal.    # Hypertension- controlled   # Tachycardia  - cont low dose BB

## 2024-10-25 NOTE — CONSULT NOTE ADULT - NS ATTEND AMEND GEN_ALL_CORE FT
pt with transient dizziness and bradycardia following recent surgery, and bedrest. Suspect vagal etiology, perhaps on baseline bradycardia.   Admission ECG with mild sinus bradycardia, narrow QRS with no AV block.   continue telemetry monitoring for now.   given prior syncope, may have recurrent vasovagal events.   Will consider ILR implant vs MCOT monitoring for further surveillance upon discharge.

## 2024-10-25 NOTE — PROGRESS NOTE ADULT - SUBJECTIVE AND OBJECTIVE BOX
INTERVAL EVENTS:  Follow up pituitary resection. Pt c/o feeling thirsty and weak but overall improving Denies headache or vomiting.    MEDICATIONS  (STANDING):  enoxaparin Injectable 40 milliGRAM(s) SubCutaneous <User Schedule>  hydrocortisone sodium succinate Injectable 25 milliGRAM(s) IV Push every 12 hours  pantoprazole    Tablet 40 milliGRAM(s) Oral before breakfast  polyethylene glycol 3350 17 Gram(s) Oral daily  senna 2 Tablet(s) Oral at bedtime  sodium chloride 0.9%. 1000 milliLiter(s) (70 mL/Hr) IV Continuous <Continuous>    MEDICATIONS  (PRN):  acetaminophen     Tablet .. 650 milliGRAM(s) Oral every 6 hours PRN Mild Pain (1 - 3)  hydrALAZINE Injectable 10 milliGRAM(s) IV Push every 2 hours PRN SBP > 160mm Hg  HYDROmorphone  Injectable 0.5 milliGRAM(s) IV Push every 6 hours PRN Severe Pain (7 - 10)  labetalol Injectable 10 milliGRAM(s) IV Push every 2 hours PRN Systolic blood pressure >160  ondansetron Injectable 4 milliGRAM(s) IV Push every 6 hours PRN Nausea and/or Vomiting  ondansetron Injectable 4 milliGRAM(s) IV Push once PRN Nausea and/or Vomiting  oxyCODONE    IR 10 milliGRAM(s) Oral every 4 hours PRN Severe Pain (7 - 10)  oxyCODONE    IR 5 milliGRAM(s) Oral every 4 hours PRN Moderate Pain (4 - 6)    Allergies  No Known Allergies    Vital Signs Last 24 Hrs  T(C): 36.5 (25 Oct 2024 08:01), Max: 37.2 (24 Oct 2024 19:50)  T(F): 97.7 (25 Oct 2024 08:01), Max: 98.9 (24 Oct 2024 19:50)  HR: 63 (25 Oct 2024 10:00) (62 - 99)  BP: 136/81 (25 Oct 2024 10:00) (113/61 - 168/92)  BP(mean): 98 (25 Oct 2024 10:00) (74 - 114)  RR: 14 (25 Oct 2024 10:00) (12 - 28)  SpO2: 99% (25 Oct 2024 10:00) (93% - 100%)    Parameters below as of 25 Oct 2024 10:00  Patient On (Oxygen Delivery Method): room air    PHYSICAL EXAM:  General: No apparent distress  Respiratory: Lungs clear bilaterally  Cardiac: +S1, S2, no m/r/g  GI: +BS, soft, non tender, non distended  Extremities: No peripheral edema  Neuro: A+O X3    LABS:                        13.4   13.46 )-----------( 196      ( 25 Oct 2024 05:38 )             41.3     10-25    140  |  106  |  17.4  ----------------------------<  127[H]  3.5   |  21.0[L]  |  0.90    Ca    8.3[L]      25 Oct 2024 05:38  Phos  2.4     10-25  Mg     2.3     10-25      Urinalysis Basic - ( 25 Oct 2024 05:38 )    Color: x / Appearance: x / SG: x / pH: x  Gluc: 127 mg/dL / Ketone: x  / Bili: x / Urobili: x   Blood: x / Protein: x / Nitrite: x   Leuk Esterase: x / RBC: x / WBC x   Sq Epi: x / Non Sq Epi: x / Bacteria: x    Thyroid Stimulating Hormone, Serum: 0.21 uIU/mL (10-24-24 @ 04:25)  Free Thyroxine, Serum: 0.9 ng/dL (10-22-24 @ 04:52)  Thyroid Stimulating Hormone, Serum: 2.19 uIU/mL (10-19-24 @ 04:15)  Triiodothyronine, Total (T3 Total): 98 ng/dL (10-19-24 @ 04:15)

## 2024-10-25 NOTE — DISCHARGE NOTE PROVIDER - NSDCFUSCHEDAPPT_GEN_ALL_CORE_FT
Dangelo Gaxiola  [facility]  ENDOCRIN [address]  Scheduled Appointment: 11/19/2024

## 2024-10-25 NOTE — CONSULT NOTE ADULT - SUBJECTIVE AND OBJECTIVE BOX
Very pleasant 59 year old female with pituitary tumor s/p pituitary resection by Dr. Guevara in 2019 who is admitted with blurry vision. 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 Patient underwent a pituitary resection performed 10/23/24. Today, pt was being assisted by RNs to the bathroom (and not telemetry at time of event); when she stood up from chair she became lightheaded and dizzy. As per RN, pt was guided back to chair and per RN she noted her eyes rolled back in her head for around 5 seconds.     Patient reports she has a long history of syncopal episodes. They first began at the age of 13 and were quite infrequent. Typically they occurred when she would be standing. She reports these events typically would have a prodrome of dizziness prior to the events.     Telemetry review reveals patient off telemetry at time of the dizziness today at around noon time. The patient reports she did feel flushed/hot at around the time of the events and upon waking up. Upon returning to telemetry noted to be bradycardic in the 40s.     PAST MEDICAL & SURGICAL HISTORY:  History of pituitary surgery  S/P  section  History of pituitary tumor    REVIEW OF SYSTEMS  General: - fever or chills, - fatigue	  Skin/Breast: - rashes  Ophthalmologic: + blurred vision	  ENMT: - sore throat  Respiratory and Thorax:	- cough  Cardiovascular: - chest pain or palpitations	  Gastrointestinal:	- N/V/D/C  Genitourinary: - dysuria  Musculoskeletal:	 - arthritis  Neurological: - weaknesses  Psychiatric: - anxiety 	    MEDICATIONS  (STANDING):  enoxaparin Injectable 40 milliGRAM(s) SubCutaneous <User Schedule>  hydrocortisone sodium succinate Injectable 25 milliGRAM(s) IV Push every 12 hours  levothyroxine 75 MICROGram(s) Oral daily  pantoprazole    Tablet 40 milliGRAM(s) Oral before breakfast  polyethylene glycol 3350 17 Gram(s) Oral daily  senna 2 Tablet(s) Oral at bedtime  sodium chloride 0.65% Nasal 1 Spray(s) Both Nostrils four times a day  sodium chloride 0.9%. 1000 milliLiter(s) (70 mL/Hr) IV Continuous <Continuous>    MEDICATIONS  (PRN):  acetaminophen     Tablet .. 650 milliGRAM(s) Oral every 6 hours PRN Mild Pain (1 - 3)  hydrALAZINE Injectable 10 milliGRAM(s) IV Push every 2 hours PRN SBP > 160mm Hg  HYDROmorphone  Injectable 0.5 milliGRAM(s) IV Push every 6 hours PRN Severe Pain (7 - 10)  labetalol Injectable 10 milliGRAM(s) IV Push every 2 hours PRN Systolic blood pressure >160  ondansetron Injectable 4 milliGRAM(s) IV Push every 6 hours PRN Nausea and/or Vomiting  ondansetron Injectable 4 milliGRAM(s) IV Push once PRN Nausea and/or Vomiting  oxyCODONE    IR 10 milliGRAM(s) Oral every 4 hours PRN Severe Pain (7 - 10)  oxyCODONE    IR 5 milliGRAM(s) Oral every 4 hours PRN Moderate Pain (4 - 6)    Allergies  No Known Allergies    SOCIAL HISTORY: Non smoker non drinker. Currently employed     FAMILY HISTORY: No family history or arrhythmias     Vital Signs Last 24 Hrs  T(C): 36.7 (25 Oct 2024 16:08), Max: 37.2 (24 Oct 2024 19:50)  T(F): 98.1 (25 Oct 2024 16:08), Max: 98.9 (24 Oct 2024 19:50)  HR: 105 (25 Oct 2024 16:00) (47 - 106)  BP: 152/98 (25 Oct 2024 16:00) (113/61 - 154/83)  BP(mean): 115 (25 Oct 2024 16:00) (75 - 115)  RR: 20 (25 Oct 2024 16:00) (12 - 25)  SpO2: 98% (25 Oct 2024 16:00) (93% - 100%)    Parameters below as of 25 Oct 2024 16:00  Patient On (Oxygen Delivery Method): room air    Physical Exam:  Constitutional: AAOx3, NAD, obese  Neck: supple, No JVD  Cardiovascular: +S1S2 RRR, 2/6 JONI at LSB   Pulmonary: CTA b/l, unlabored, no wheezes, rales. No rhonchi  Abdomen: +BS, soft NTND  Extremities: no edema b/l,   Neuro: non focal, speech clear, BLEDSOE x 4    LABS:                        13.4   13.46 )-----------( 196      ( 25 Oct 2024 05:38 )             41.3     140  |  106  |  17.4  ----------------------------<  127[H]  3.5   |  21.0[L]  |  0.90    Ca    8.3[L]      25 Oct 2024 05:38  Phos  2.4     10-25  Mg     2.3     10-25    RADIOLOGY & ADDITIONAL STUDIES:  TTE 10/19/24  CONCLUSIONS:   1. Technically difficult image quality.   2. Left ventricular cavity is normal in size. Left ventricular wall thickness is normal. Left ventricular systolic function is normal with an ejection fraction visually estimated at 65 to 70 %. There is poor visualization of the endocardial borders to determine the presence of wall motion abnormalities.   3. Normal left ventricular diastolic function, with normal left ventricular filling pressure.   4. Probably normal right ventricular cavity size and probably normal right ventricular systolic function.   5. Normal left and right atrial size.   6. Trileaflet aortic valve with normal systolic excursion. Fibrocalcific aortic valve sclerosis without stenosis.   7. No pericardial effusion seen.   8. No prior echocardiogram is available for comparison.    MRI head 10/24/24  IMPRESSION: Status post transsphenoidal resection/debulking of a   pituitary macroadenoma. Postsurgical changes in the sellar region.   Residual neoplasm in the right aspect of the sella extending into the   anterior suprasellar region. In the right aspect of the sella residual   neoplasm measures 12 x 6 x 6 mm. In the anterior suprasellar region   residual neoplasm measures 13 x 14 x 9 mm.    EKG: 10/19/24: SR at 56b; QRSD 80ms; MA 138ms    A/P  59 year old female with pituitary tumor s/p pituitary resection in 2019 who is now s/p yet another pituitary resection on 10/23/24. EP consulted regarding presyncope    Baseline EKG pre-op with normal intervals  Normal EF  Telemetry not applied to patient at time of event. Immediate telemetry afterwards demonstrative of SB with rates in the 40s and perhaps a few junctional beats but only one lead applied to patient    - Repeat EKG  - Check orthostatics  - Event appears to be vagally mediated.   - No clear indication for pacemaker at this time.

## 2024-10-25 NOTE — DISCHARGE NOTE PROVIDER - CARE PROVIDER_API CALL
Berto Guevara  Neurosurgery  [address], [address]  [place] [address]  Phone: [phone]  Fax: [phone]  Follow Up Time: 2 weeks   Berto Guevara  Neurosurgery  [address], [address]  [place] [address]  Phone: [phone]  Fax: [phone]  Follow Up Time: 2 weeks    Sergio Cantu  Cardiac Electrophysiology  [address] [place] [address]  Phone: [phone]  Fax: [phone]  Follow Up Time:

## 2024-10-25 NOTE — DISCHARGE NOTE PROVIDER - NSDCFUADDINST_GEN_ALL_CORE_FT
Follow up with Dr. Cantu in 3-4 weeks. Our office will contact you in 3-5 days to schedule this appointment. Please call [phone] with questions or concerns.

## 2024-10-25 NOTE — CONSULT NOTE ADULT - CONSULT REASON
TSP
Evaluation of syncope
Pituitary macroadenoma
Medical Management
pre-operative risk stratification

## 2024-10-25 NOTE — PROGRESS NOTE ADULT - SUBJECTIVE AND OBJECTIVE BOX
FERNANDO DOMINGUEZ Patient is a 59y old  Female who presents with a chief complaint of Pituitary Mass (25 Oct 2024 15:50)     HPI:  60 yo F with hx of TSP for pituitary resection by Dr. Guevara in 2019 presents for blurry vision. Pt has been having blurry vision for a few weeks. Pt went to [facility] for eye exam due to blurry vision. They sent her for MRI which was preformed at a 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 Pt did not have Dr. Guevara's info so presented to [facility] Pt transferred to [facility].   Pt denies HA, SOB, palpitations.  (18 Oct 2024 19:16)    The patient was seen and evaluated pleasant talking answers appropriately - offers no complaints - understands has to be complaint with meds- denied any SOB or SP  The patient is in no acute distress.  Denied any fever abdominal pain, fever, dysuria, cough, edema       I&O's Summary    24 Oct 2024 07:01  -  25 Oct 2024 07:00  --------------------------------------------------------  IN: 1380 mL / OUT: 300 mL / NET: 1080 mL      Allergies    No Known Allergies    Intolerances      HEALTH ISSUES - PROBLEM Dx:  Pre-operative cardiovascular examination    Syncope          PAST MEDICAL & SURGICAL HISTORY:  History of pituitary surgery      S/P  section      History of pituitary tumor              Vital Signs Last 24 Hrs  T(C): 36.7 (25 Oct 2024 16:08), Max: 37.2 (24 Oct 2024 19:50)  T(F): 98.1 (25 Oct 2024 16:08), Max: 98.9 (24 Oct 2024 19:50)  HR: 105 (25 Oct 2024 16:00) (47 - 106)  BP: 152/98 (25 Oct 2024 16:00) (113/61 - 154/83)  BP(mean): 115 (25 Oct 2024 16:00) (75 - 115)  RR: 20 (25 Oct 2024 16:00) (12 - 25)  SpO2: 98% (25 Oct 2024 16:00) (93% - 100%)    Parameters below as of 25 Oct 2024 16:00  Patient On (Oxygen Delivery Method): room air    T(C): 36.7 (10-25-24 @ 16:08), Max: 37.2 (10-24-24 @ 19:50)  HR: 105 (10-25-24 @ 16:00) (47 - 106)  BP: 152/98 (10-25-24 @ 16:00) (113/61 - 154/83)  RR: 20 (10-25-24 @ 16:00) (12 - 25)  SpO2: 98% (10-25-24 @ 16:00) (93% - 100%)  Wt(kg): --    PHYSICAL EXAM:    GENERAL: NAD, pleaasant conversing   HEAD: dressing under the nose over the lip   EYES: EOMI, PERRL, conjunctiva and sclera clear  ENMT:  Moist mucous membranes,   NERVOUS SYSTEM:  Alert & Oriented X3,  Moves upper and lower extremities; CNS-II-XII  CHEST/LUNG: Clear to auscultation bilaterally;   HEART: Regular rate and rhythm; No murmurs,   ABDOMEN: Soft, Nontender, Nondistended; Bowel sounds present  EXTREMITIES:  Peripheral Pulses, No  cyanosis, or edema  psychiatry- mood and affect appropriate, Insight and judgement intact     acetaminophen     Tablet .. 650 milliGRAM(s) Oral every 6 hours PRN  enoxaparin Injectable 40 milliGRAM(s) SubCutaneous <User Schedule>  hydrALAZINE Injectable 10 milliGRAM(s) IV Push every 2 hours PRN  hydrocortisone sodium succinate Injectable 25 milliGRAM(s) IV Push every 12 hours  HYDROmorphone  Injectable 0.5 milliGRAM(s) IV Push every 6 hours PRN  labetalol Injectable 10 milliGRAM(s) IV Push every 2 hours PRN  levothyroxine 75 MICROGram(s) Oral daily  ondansetron Injectable 4 milliGRAM(s) IV Push every 6 hours PRN  ondansetron Injectable 4 milliGRAM(s) IV Push once PRN  oxyCODONE    IR 10 milliGRAM(s) Oral every 4 hours PRN  oxyCODONE    IR 5 milliGRAM(s) Oral every 4 hours PRN  pantoprazole    Tablet 40 milliGRAM(s) Oral before breakfast  polyethylene glycol 3350 17 Gram(s) Oral daily  senna 2 Tablet(s) Oral at bedtime  sodium chloride 0.65% Nasal 1 Spray(s) Both Nostrils four times a day  sodium chloride 0.9%. 1000 milliLiter(s) IV Continuous <Continuous>      LABS:                          13.4   13.46 )-----------( 196      ( 25 Oct 2024 05:38 )             41.3     10    140  |  106  |  17.4  ----------------------------<  127[H]  3.5   |  21.0[L]  |  0.90    Ca    8.3[L]      25 Oct 2024 05:38  Phos  2.4     10-25  Mg     2.3     10-25              Urinalysis Basic - ( 25 Oct 2024 05:38 )    Color: x / Appearance: x / SG: x / pH: x  Gluc: 127 mg/dL / Ketone: x  / Bili: x / Urobili: x   Blood: x / Protein: x / Nitrite: x   Leuk Esterase: x / RBC: x / WBC x   Sq Epi: x / Non Sq Epi: x / Bacteria: x      CAPILLARY BLOOD GLUCOSE          RADIOLOGY & ADDITIONAL TESTS:      Consultant notes reviewed  I independently reviwed all images/ labarotory results /cultures/ telemetry/EKG and my findings are indicated above  and discussed care plan with consultants/nursing/ family /patient

## 2024-10-25 NOTE — PROGRESS NOTE ADULT - ASSESSMENT
59 y.o. female with hx of TSP presented with blurry vision for a few weeks. 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 Pt is now s/p pituitary resection performed 10/23/24. Today, pt was being assisted by RNs to the bathroom; when she stood up from chair she became lightheaded and dizzy. As per RN, pt was guided back to chair and after 5-10 seconds pt returned to baseline mental status. Pt states she is still feeling weak at this time. Pt on telemetry during incident, bradycardia noted HR 40s at that time.

## 2024-10-25 NOTE — DISCHARGE NOTE PROVIDER - CARE PROVIDERS DIRECT ADDRESSES
,DirectAddress_Unknown ,DirectAddress_Unknown,[email or web address]

## 2024-10-25 NOTE — DISCHARGE NOTE PROVIDER - NSDCMRMEDTOKEN_GEN_ALL_CORE_FT
Labor progress note    S:  Patient continues to be up right and mobile.  Reporting increased pelvic pressure intermittently while on ball.  Consented to antibiotic prophylaxis.    O:  Blood pressure 119/69, temperature 98.5  F (36.9  C), temperature source Oral, resp. rate 18, last menstrual period 2018, not currently breastfeeding.  General appearance: uncomfortable with contractions.  Contractions: Every 7-8.5 minutes. 60-70 seconds duration.  Palpate: strong.  FHT: Baseline 140 with moderate variability. Accelerations are present. Rare late decelerations present.  ROM: not ruptured.   Pelvic exam:deferred  Pitocin- none,  Antibiotics- PCN, not started.  RN attempted IV start x2, now patient refuses another attempt at IV start.        A:  IUP @ 39w3d active labor   Fetal Heart rate tracing Category category one  GBS- positive  Patient Active Problem List   Diagnosis     Irregular menstrual cycle     Encounter for supervision of normal first pregnancy in second trimester - [facility]     Research study patient-Has mouthguard on L and D, please give it to patient when admitted to L and D     Labor and delivery indication for care or intervention     Encounter for triage in pregnant patient         P:  Anticipate    RN to continue to attempt IV access for antibiotic prophylaxis.  TIGRE [facility]   hydrocortisone 10 mg oral tablet: 1 tab(s) orally once a day please take this 10mg tablet in addition to one 20 mg tablet for AM dose. (total 30mg)  hydrocortisone 20 mg oral tablet: 1 tab(s) orally 2 times a day please take hydrocortisone 30 mg in AM (one 20mg tablet and one 10mg tablet) and hydrocortisone 20mg in PM  levothyroxine 75 mcg (0.075 mg) oral tablet: 1 tab(s) orally once a day please take one tablet in the morning on an empty stomach.   amLODIPine 5 mg oral tablet: 1 tab(s) orally once a day  atorvastatin 20 mg oral tablet: 1 tab(s) orally once a day (at bedtime)  hydrocortisone 10 mg oral tablet: 1 tab(s) orally once a day please take this 10mg tablet in addition to one 20 mg tablet for AM dose. (total 30mg)  hydrocortisone 20 mg oral tablet: 1 tab(s) orally 2 times a day please take hydrocortisone 30 mg in AM (one 20mg tablet and one 10mg tablet) and hydrocortisone 20mg in PM  levothyroxine 75 mcg (0.075 mg) oral tablet: 1 tab(s) orally once a day please take one tablet in the morning on an empty stomach.

## 2024-10-25 NOTE — CHART NOTE - NSCHARTNOTEFT_GEN_A_CORE
Neurosurgery team called by nursing staff for patients for syncope episode. Per Nurse, patient experienced dizziness s/p standing up to use the bathroom. Pt was lowered back to bed and experienced a "5 second episode of passing out" (nurse reported pt was bradycardic to ~40s at the time). Examined patient at bedside, -----        Recommendations as per following  - medicine made aware  - recommend to check orthostatic blood pressure  - reconsulted cardiology for syncope workup  - will continue to monitor vital signs and clinical appearance. Neurosurgery team called by nursing staff for patients for syncope episode. Per Nurse, patient experienced dizziness s/p standing up  from chair to use the bathroom. Pt was lowered back to chair and experienced a "5 second episode of passing out" (nurse reported pt was bradycardic to ~40s at the time). Examined patient at bedside, patient sitting comfortably in bed and is back to baseline. Vital signs WNL.     Recommendations as per following  - medicine made aware  - recommend to check orthostatic blood pressure  - reconsulted cardiology for syncope workup  - will continue to monitor vital signs and clinical appearance.

## 2024-10-25 NOTE — DISCHARGE NOTE PROVIDER - PROVIDER TOKENS
PROVIDER:[TOKEN:[[id number]],FOLLOWUP:[2 weeks]] PROVIDER:[TOKEN:[[id number]],FOLLOWUP:[2 weeks]],PROVIDER:[TOKEN:[[id number]]]

## 2024-10-25 NOTE — PROGRESS NOTE ADULT - ASSESSMENT
59F with PMHx transphenoidal pituitary resection by Dr. Guevara in 2019 presented with c/o visual disturbances. 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     1. 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 TSH low but may be unreliable after pituitary surgery   - Serum . Monitor for diabetes insipidus, I&Os/BMP   59F with PMHx transphenoidal pituitary resection by Dr. Guevara in 2019 presented with c/o visual disturbances. 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     1. 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 TSH low but may be unreliable after pituitary surgery   - Serum . Monitor for diabetes insipidus, I&Os/BMP  - Will arrange outpatient follow up with our office   59F with PMHx transphenoidal pituitary resection by Dr. Guevara in 2019 presented with c/o visual disturbances. 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     1. 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 TSH low but may be unreliable after pituitary surgery   - Start levothyroxine 75mcg PO daily  - Serum . Monitor for diabetes insipidus, I&Os/BMP  - Follow up appt: 11/19 @ 11:40AM with Dr Gaxiola ([address] [facility])

## 2024-10-26 LAB
ANION GAP SERPL CALC-SCNC: 13 MMOL/L — SIGNIFICANT CHANGE UP (ref 5–17)
BUN SERPL-MCNC: 12.2 MG/DL — SIGNIFICANT CHANGE UP (ref 8–20)
CALCIUM SERPL-MCNC: 8.7 MG/DL — SIGNIFICANT CHANGE UP (ref 8.4–10.5)
CHLORIDE SERPL-SCNC: 104 MMOL/L — SIGNIFICANT CHANGE UP (ref 96–108)
CK SERPL-CCNC: 105 U/L — SIGNIFICANT CHANGE UP (ref 25–170)
CO2 SERPL-SCNC: 22 MMOL/L — SIGNIFICANT CHANGE UP (ref 22–29)
CREAT SERPL-MCNC: 0.66 MG/DL — SIGNIFICANT CHANGE UP (ref 0.5–1.3)
EGFR: 100 ML/MIN/1.73M2 — SIGNIFICANT CHANGE UP
GLUCOSE SERPL-MCNC: 78 MG/DL — SIGNIFICANT CHANGE UP (ref 70–99)
HCT VFR BLD CALC: 43.5 % — SIGNIFICANT CHANGE UP (ref 34.5–45)
HGB BLD-MCNC: 14.9 G/DL — SIGNIFICANT CHANGE UP (ref 11.5–15.5)
MCHC RBC-ENTMCNC: 30 PG — SIGNIFICANT CHANGE UP (ref 27–34)
MCHC RBC-ENTMCNC: 34.3 GM/DL — SIGNIFICANT CHANGE UP (ref 32–36)
MCV RBC AUTO: 87.7 FL — SIGNIFICANT CHANGE UP (ref 80–100)
PLATELET # BLD AUTO: 199 K/UL — SIGNIFICANT CHANGE UP (ref 150–400)
POTASSIUM SERPL-MCNC: 4 MMOL/L — SIGNIFICANT CHANGE UP (ref 3.5–5.3)
POTASSIUM SERPL-SCNC: 4 MMOL/L — SIGNIFICANT CHANGE UP (ref 3.5–5.3)
RBC # BLD: 4.96 M/UL — SIGNIFICANT CHANGE UP (ref 3.8–5.2)
RBC # FLD: 13.9 % — SIGNIFICANT CHANGE UP (ref 10.3–14.5)
SODIUM SERPL-SCNC: 139 MMOL/L — SIGNIFICANT CHANGE UP (ref 135–145)
TROPONIN T, HIGH SENSITIVITY RESULT: 13 NG/L — SIGNIFICANT CHANGE UP (ref 0–51)
WBC # BLD: 14.43 K/UL — HIGH (ref 3.8–10.5)
WBC # FLD AUTO: 14.43 K/UL — HIGH (ref 3.8–10.5)

## 2024-10-26 PROCEDURE — 99232 SBSQ HOSP IP/OBS MODERATE 35: CPT

## 2024-10-26 PROCEDURE — 93010 ELECTROCARDIOGRAM REPORT: CPT

## 2024-10-26 PROCEDURE — 99233 SBSQ HOSP IP/OBS HIGH 50: CPT

## 2024-10-26 RX ORDER — HYDROCORTISONE 10 MG/1
10 TABLET ORAL EVERY 12 HOURS
Refills: 0 | Status: DISCONTINUED | OUTPATIENT
Start: 2024-10-27 | End: 2024-10-28

## 2024-10-26 RX ORDER — AMLODIPINE BESYLATE 10 MG
5 TABLET ORAL DAILY
Refills: 0 | Status: DISCONTINUED | OUTPATIENT
Start: 2024-10-26 | End: 2024-10-28

## 2024-10-26 RX ORDER — POTASSIUM CHLORIDE 10 MEQ
40 TABLET, EXTENDED RELEASE ORAL ONCE
Refills: 0 | Status: COMPLETED | OUTPATIENT
Start: 2024-10-26 | End: 2024-10-26

## 2024-10-26 RX ADMIN — Medication 40 MILLIEQUIVALENT(S): at 05:07

## 2024-10-26 RX ADMIN — Medication 2 TABLET(S): at 21:02

## 2024-10-26 RX ADMIN — POLYETHYLENE GLYCOL 3350 17 GRAM(S): 17 POWDER, FOR SOLUTION ORAL at 12:51

## 2024-10-26 RX ADMIN — Medication 40 MILLIGRAM(S): at 17:00

## 2024-10-26 RX ADMIN — PANTOPRAZOLE SODIUM 40 MILLIGRAM(S): 40 TABLET, DELAYED RELEASE ORAL at 05:08

## 2024-10-26 RX ADMIN — Medication 1 SPRAY(S): at 05:08

## 2024-10-26 RX ADMIN — Medication 1 SPRAY(S): at 17:01

## 2024-10-26 RX ADMIN — HYDROCORTISONE 10 MILLIGRAM(S): 10 TABLET ORAL at 05:18

## 2024-10-26 RX ADMIN — Medication 5 MILLIGRAM(S): at 17:00

## 2024-10-26 RX ADMIN — Medication 75 MICROGRAM(S): at 05:08

## 2024-10-26 RX ADMIN — Medication 1 SPRAY(S): at 00:15

## 2024-10-26 RX ADMIN — Medication 1 SPRAY(S): at 12:51

## 2024-10-26 RX ADMIN — HYDROCORTISONE 10 MILLIGRAM(S): 10 TABLET ORAL at 17:00

## 2024-10-26 NOTE — PROGRESS NOTE ADULT - SUBJECTIVE AND OBJECTIVE BOX
[facility] Division of Hospital Medicine    Chief Complaint:  Pituitary Mass    SUBJECTIVE / OVERNIGHT EVENTS:  Pt reports mild headache     Patient denies chest pain, SOB, abd pain, N/V, fever, chills, dysuria or any other complaints. All remainder ROS negative.     MEDICATIONS  (STANDING):  enoxaparin Injectable 40 milliGRAM(s) SubCutaneous <User Schedule>  hydrocortisone sodium succinate Injectable 10 milliGRAM(s) IV Push every 12 hours  levothyroxine 75 MICROGram(s) Oral daily  pantoprazole    Tablet 40 milliGRAM(s) Oral before breakfast  polyethylene glycol 3350 17 Gram(s) Oral daily  senna 2 Tablet(s) Oral at bedtime  sodium chloride 0.65% Nasal 1 Spray(s) Both Nostrils four times a day  sodium chloride 0.9%. 1000 milliLiter(s) (70 mL/Hr) IV Continuous <Continuous>    MEDICATIONS  (PRN):  acetaminophen     Tablet .. 650 milliGRAM(s) Oral every 6 hours PRN Mild Pain (1 - 3)  hydrALAZINE Injectable 10 milliGRAM(s) IV Push every 2 hours PRN SBP > 160mm Hg  HYDROmorphone  Injectable 0.5 milliGRAM(s) IV Push every 6 hours PRN Severe Pain (7 - 10)  labetalol Injectable 10 milliGRAM(s) IV Push every 2 hours PRN Systolic blood pressure >160  ondansetron Injectable 4 milliGRAM(s) IV Push every 6 hours PRN Nausea and/or Vomiting  ondansetron Injectable 4 milliGRAM(s) IV Push once PRN Nausea and/or Vomiting  oxyCODONE    IR 10 milliGRAM(s) Oral every 4 hours PRN Severe Pain (7 - 10)  oxyCODONE    IR 5 milliGRAM(s) Oral every 4 hours PRN Moderate Pain (4 - 6)        I&O's Summary    25 Oct 2024 07:01  -  26 Oct 2024 07:00  --------------------------------------------------------  IN: 730 mL / OUT: 0 mL / NET: 730 mL        PHYSICAL EXAM:  Vital Signs Last 24 Hrs  T(C): 36.7 (26 Oct 2024 07:28), Max: 37.2 (25 Oct 2024 19:23)  T(F): 98.1 (26 Oct 2024 07:28), Max: 99 (25 Oct 2024 19:23)  HR: 77 (26 Oct 2024 14:00) (55 - 117)  BP: 157/101 (26 Oct 2024 14:00) (130/82 - 165/95)  BP(mean): 119 (26 Oct 2024 14:00) (96 - 119)  RR: 18 (26 Oct 2024 14:00) (16 - 20)  SpO2: 99% (26 Oct 2024 14:00) (97% - 100%)    Parameters below as of 26 Oct 2024 14:00  Patient On (Oxygen Delivery Method): room air          CONSTITUTIONAL: NAD, sitting up in bed  ENMT:  dressing in place   RESPIRATORY: Normal respiratory effort; lungs are clear to auscultation bilaterally  CARDIOVASCULAR: Regular rate and rhythm, normal S1 and S2   ABDOMEN: Nontender to palpation, normoactive bowel sounds  PSYCH: A+O to person, place, and time; affect appropriate  NEUROLOGY: No gross deficits       LABS:                        14.9   14.43 )-----------( 199      ( 26 Oct 2024 13:09 )             43.5     10-26    139  |  104  |  12.2  ----------------------------<  78  4.0   |  22.0  |  0.66    Ca    8.7      26 Oct 2024 13:09  Phos  2.4     10-25  Mg     2.3     10-25            Urinalysis Basic - ( 26 Oct 2024 13:09 )    Color: x / Appearance: x / SG: x / pH: x  Gluc: 78 mg/dL / Ketone: x  / Bili: x / Urobili: x   Blood: x / Protein: x / Nitrite: x   Leuk Esterase: x / RBC: x / WBC x   Sq Epi: x / Non Sq Epi: x / Bacteria: x

## 2024-10-26 NOTE — PROGRESS NOTE ADULT - ASSESSMENT
59F known to [facility], had transphenoidal resection 5 years ago, lost to follow up. Now presenting with recurrence, left visual loss progressive over several months now s/p transphenoidal resection of pituitary mass with Dr. Guevara on 10/23/24, POD#0.    Plan:  - Q4 h neuro checks  - Normotension SBP   - Pain control PRN, avoid oversedation  - Transphenoidal precautions: NO nose blowing, CPAP/BiPAP, nasal cannula, nasal swabs, NGT, straws or incentive spirometry; avoid straining  - Monitor for any signs of CSF leak  - Solucortef taper, C/W solumedrol 30 day and 20night until f/u with [facility] OP  -C/W Levothyroxine 75  - GI prophylaxis as on steroids  - Bowel regimen: senna, miralax  - PT/OT   - DVT ppx: SCDs ,  Lovenox .  -pt had syncope yesterday, cardio eval recommend monitoring for another 24hrs, EP recommend ILR vs Mcot  -called Dr Rodriges today, states will place ILR on Monday 10/28/24 if pt stays.   -D/W Dr. Krishnan     59F known to [facility], had transphenoidal resection 5 years ago, lost to follow up. Now presenting with recurrence, left visual loss progressive over several months now s/p transphenoidal resection of pituitary mass with Dr. Guevara on 10/23/24, POD#0.    Plan:  - Q4 h neuro checks  - Normotension SBP   - Pain control PRN, avoid oversedation  - Transphenoidal precautions: NO nose blowing, CPAP/BiPAP, nasal cannula, nasal swabs, NGT, straws or incentive spirometry; avoid straining  - Monitor for any signs of CSF leak  - Solucortef taper, F/U endo OP  -C/W Levothyroxine 75  - GI prophylaxis as on steroids  - Bowel regimen: senna, miralax  - PT/OT   - DVT ppx: SCDs ,  Lovenox .  -pt had syncope yesterday, cardio eval recommend monitoring for another 24hrs, EP recommend ILR vs Mcot  -called Dr Rodriges today, states will place ILR on Monday 10/28/24 if pt stays.   -D/W Dr. Krishnan     59F known to [facility], had transphenoidal resection 5 years ago, lost to follow up. Now presenting with recurrence, left visual loss progressive over several months now s/p transphenoidal resection of pituitary mass with Dr. Guevara on 10/23/24, POD#0.    Plan:  - Q4 h neuro checks  - Normotension SBP   - Pain control PRN, avoid oversedation  - Transphenoidal precautions: NO nose blowing, CPAP/BiPAP, nasal cannula, nasal swabs, NGT, straws or incentive spirometry; avoid straining  - Monitor for any signs of CSF leak  - Solucortef taper, F/U endo OP  -C/W Levothyroxine 75  - GI prophylaxis as on steroids  - Bowel regimen: senna, miralax, last BM today   - PT/OT   - DVT ppx: SCDs ,  Lovenox .  -pt had syncope yesterday, cardio eval recommend monitoring for another 24hrs, EP recommend ILR vs Mcot  -called Dr Rodriges today, states will place ILR on Monday 10/28/24 if pt stays.   -D/W Dr. Krishnan

## 2024-10-26 NOTE — PROGRESS NOTE ADULT - SUBJECTIVE AND OBJECTIVE BOX
HPI:  58 yo F with hx of TSP for pituitary resection by Dr. Guevara in 2019 presents for blurry vision. Pt has been having blurry vision for a few weeks. Pt went to [facility] for eye exam due to blurry vision. 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 Pt did not have Dr. Guevara's info so presented to [facility]. Pt transferred to [facility].   Pt denies HA, SOB, palpitations.  (18 Oct 2024 19:16)      INTERVAL HPI/OVERNIGHT EVENTS:  59y Female s/p transphenoidal resection of pituitary mass POD# 3, pt was seen and examined earlier this morning. pt is  lying in bed with mild congestion HA  R>L, no change of V/H. no sign of leaking CSF . Tolerating diet. Passing gas. Voiding. Denies headache, weakness, numbness, n/v/d, fevers, chills, chest pain, SOB.     Vital Signs Last 24 Hrs  T(C): 36.7 (26 Oct 2024 07:28), Max: 37.2 (25 Oct 2024 19:23)  T(F): 98.1 (26 Oct 2024 07:28), Max: 99 (25 Oct 2024 19:23)  HR: 55 (26 Oct 2024 08:00) (55 - 117)  BP: 133/87 (26 Oct 2024 08:00) (130/82 - 165/95)  BP(mean): 101 (26 Oct 2024 08:00) (96 - 117)  RR: 16 (26 Oct 2024 08:00) (16 - 20)  SpO2: 99% (26 Oct 2024 08:00) (97% - 100%)    Parameters below as of 26 Oct 2024 08:00  Patient On (Oxygen Delivery Method): room air        PHYSICAL EXAM:  GENERAL: NAD  HEAD: Atraumatic, normocephalic  WOUND: Nasal packing clean dry intact  MENTAL STATUS: AAOx3; awake; opens eyes spontaneously; appropriately conversant without aphasia; following simple commands  CRANIAL NERVES: + L eye outer peripheral field cut. PERRL. EOMI without nystagmus. Facial sensation intact V1-3 distribution b/l. Face symmetric w/ normal eye closure and smile, tongue midline. Hearing grossly intact. Speech clear. Head turning and shoulder shrug intact.   MOTOR: Strength 5/5 b/l upper and lower extremities  SENSATION: Grossly intact to light touch all extremities  CHEST/LUNG: Non-labored breathing on room air  SKIN: Warm, dry  moustache dressing clean/dry. no sign of CSF leaking.     LABS:                        13.4   13.46 )-----------( 196      ( 25 Oct 2024 05:38 )             41.3     10-25    140  |  106  |  17.4  ----------------------------<  127[H]  3.5   |  21.0[L]  |  0.90    Ca    8.3[L]      25 Oct 2024 05:38  Phos  2.4     10-25  Mg     2.3     10-25        Urinalysis Basic - ( 25 Oct 2024 05:38 )    Color: x / Appearance: x / SG: x / pH: x  Gluc: 127 mg/dL / Ketone: x  / Bili: x / Urobili: x   Blood: x / Protein: x / Nitrite: x   Leuk Esterase: x / RBC: x / WBC x   Sq Epi: x / Non Sq Epi: x / Bacteria: x        10-25 @ 07:01  -  10-26 @ 07:00  --------------------------------------------------------  IN: 730 mL / OUT: 0 mL / NET: 730 mL        RADIOLOGY & ADDITIONAL TESTS:

## 2024-10-26 NOTE — PROGRESS NOTE ADULT - SUBJECTIVE AND OBJECTIVE BOX
Subjective: Patient sitting comfortably at bedside at time of assessment. Asymptomatic. Denies recurrent dizziness, LH, syncope or presyncope.       TELE:  Sinus rhythm at 55-105bpm,  brief episodes of ST to the 130s.     MEDICATIONS  (STANDING):  enoxaparin Injectable 40 milliGRAM(s) SubCutaneous <User Schedule>  hydrocortisone sodium succinate Injectable 10 milliGRAM(s) IV Push every 12 hours  levothyroxine 75 MICROGram(s) Oral daily  pantoprazole    Tablet 40 milliGRAM(s) Oral before breakfast  polyethylene glycol 3350 17 Gram(s) Oral daily  senna 2 Tablet(s) Oral at bedtime  sodium chloride 0.65% Nasal 1 Spray(s) Both Nostrils four times a day  sodium chloride 0.9%. 1000 milliLiter(s) (70 mL/Hr) IV Continuous <Continuous>    MEDICATIONS  (PRN):  acetaminophen     Tablet .. 650 milliGRAM(s) Oral every 6 hours PRN Mild Pain (1 - 3)  hydrALAZINE Injectable 10 milliGRAM(s) IV Push every 2 hours PRN SBP > 160mm Hg  HYDROmorphone  Injectable 0.5 milliGRAM(s) IV Push every 6 hours PRN Severe Pain (7 - 10)  labetalol Injectable 10 milliGRAM(s) IV Push every 2 hours PRN Systolic blood pressure >160  ondansetron Injectable 4 milliGRAM(s) IV Push every 6 hours PRN Nausea and/or Vomiting  ondansetron Injectable 4 milliGRAM(s) IV Push once PRN Nausea and/or Vomiting  oxyCODONE    IR 10 milliGRAM(s) Oral every 4 hours PRN Severe Pain (7 - 10)  oxyCODONE    IR 5 milliGRAM(s) Oral every 4 hours PRN Moderate Pain (4 - 6)      Allergies    No Known Allergies    Intolerances        Vital Signs Last 24 Hrs  T(C): 36.7 (26 Oct 2024 07:28), Max: 37.2 (25 Oct 2024 19:23)  T(F): 98.1 (26 Oct 2024 07:28), Max: 99 (25 Oct 2024 19:23)  HR: 66 (26 Oct 2024 12:00) (55 - 117)  BP: 140/103 (26 Oct 2024 12:00) (130/82 - 165/95)  BP(mean): 114 (26 Oct 2024 12:00) (96 - 117)  RR: 18 (26 Oct 2024 12:00) (16 - 20)  SpO2: 99% (26 Oct 2024 12:00) (97% - 100%)    Parameters below as of 26 Oct 2024 12:00  Patient On (Oxygen Delivery Method): room air        Physical Exam:  Constitutional: NAD, AAOx3  Cardiovascular: +S1S2 RRR  Pulmonary: CTA b/l, unlabored  GI: soft NTND +BS  Extremities: no pedal edema, +distal pulses b/l  Neuro: non focal, BLEDSOE x4    LABS:                        13.4   13.46 )-----------( 196      ( 25 Oct 2024 05:38 )             41.3     10-25    140  |  106  |  17.4  ----------------------------<  127[H]  3.5   |  21.0[L]  |  0.90    Ca    8.3[L]      25 Oct 2024 05:38  Phos  2.4     10-25  Mg     2.3     10-25        Urinalysis Basic - ( 25 Oct 2024 05:38 )    Color: x / Appearance: x / SG: x / pH: x  Gluc: 127 mg/dL / Ketone: x  / Bili: x / Urobili: x   Blood: x / Protein: x / Nitrite: x   Leuk Esterase: x / RBC: x / WBC x   Sq Epi: x / Non Sq Epi: x / Bacteria: x      ASSESSMENT/PLAN:    59 year old female with history of remote syncope and pituitary tumor s/p pituitary resection in 2019 who is now s/p yet another pituitary resection on 10/23/24. On POD #2, had any episode of dizziness and presyncope upon standing resulting in EP consult.  Baseline EKG pre-op with normal intervals and TTE Normal EF  Telemetry not applied to patient at time of event. Immediate telemetry afterwards demonstrative of suspected junctional rhythm and SB to the 40s.    - Presyncope with possible vagal etiology  - No further events and remains asymptomatic.   - Continue telemetry monitoring.   - No clear indication for pacemaker at this time.   - History of remote sycnope as child (?vasovagal).  Will consider ILR implant vs MCOT monitoring at time of discharge.  - Will discuss with EP MD.  Formal recommendations to follow Subjective: Patient sitting comfortably at bedside at time of assessment. Asymptomatic. Denies recurrent dizziness, LH, syncope or presyncope.       TELE:  Sinus rhythm at 55-105bpm,  brief episodes of ST to the 130s.     MEDICATIONS  (STANDING):  enoxaparin Injectable 40 milliGRAM(s) SubCutaneous <User Schedule>  hydrocortisone sodium succinate Injectable 10 milliGRAM(s) IV Push every 12 hours  levothyroxine 75 MICROGram(s) Oral daily  pantoprazole    Tablet 40 milliGRAM(s) Oral before breakfast  polyethylene glycol 3350 17 Gram(s) Oral daily  senna 2 Tablet(s) Oral at bedtime  sodium chloride 0.65% Nasal 1 Spray(s) Both Nostrils four times a day  sodium chloride 0.9%. 1000 milliLiter(s) (70 mL/Hr) IV Continuous <Continuous>    MEDICATIONS  (PRN):  acetaminophen     Tablet .. 650 milliGRAM(s) Oral every 6 hours PRN Mild Pain (1 - 3)  hydrALAZINE Injectable 10 milliGRAM(s) IV Push every 2 hours PRN SBP > 160mm Hg  HYDROmorphone  Injectable 0.5 milliGRAM(s) IV Push every 6 hours PRN Severe Pain (7 - 10)  labetalol Injectable 10 milliGRAM(s) IV Push every 2 hours PRN Systolic blood pressure >160  ondansetron Injectable 4 milliGRAM(s) IV Push every 6 hours PRN Nausea and/or Vomiting  ondansetron Injectable 4 milliGRAM(s) IV Push once PRN Nausea and/or Vomiting  oxyCODONE    IR 10 milliGRAM(s) Oral every 4 hours PRN Severe Pain (7 - 10)  oxyCODONE    IR 5 milliGRAM(s) Oral every 4 hours PRN Moderate Pain (4 - 6)      Allergies    No Known Allergies    Intolerances        Vital Signs Last 24 Hrs  T(C): 36.7 (26 Oct 2024 07:28), Max: 37.2 (25 Oct 2024 19:23)  T(F): 98.1 (26 Oct 2024 07:28), Max: 99 (25 Oct 2024 19:23)  HR: 66 (26 Oct 2024 12:00) (55 - 117)  BP: 140/103 (26 Oct 2024 12:00) (130/82 - 165/95)  BP(mean): 114 (26 Oct 2024 12:00) (96 - 117)  RR: 18 (26 Oct 2024 12:00) (16 - 20)  SpO2: 99% (26 Oct 2024 12:00) (97% - 100%)    Parameters below as of 26 Oct 2024 12:00  Patient On (Oxygen Delivery Method): room air        Physical Exam:  Constitutional: NAD, AAOx3  Cardiovascular: +S1S2 RRR  Pulmonary: CTA b/l, unlabored  GI: soft NTND +BS  Extremities: no pedal edema, +distal pulses b/l  Neuro: non focal, BLEDSOE x4    LABS:                        13.4   13.46 )-----------( 196      ( 25 Oct 2024 05:38 )             41.3     10-25    140  |  106  |  17.4  ----------------------------<  127[H]  3.5   |  21.0[L]  |  0.90    Ca    8.3[L]      25 Oct 2024 05:38  Phos  2.4     10-25  Mg     2.3     10-25        Urinalysis Basic - ( 25 Oct 2024 05:38 )    Color: x / Appearance: x / SG: x / pH: x  Gluc: 127 mg/dL / Ketone: x  / Bili: x / Urobili: x   Blood: x / Protein: x / Nitrite: x   Leuk Esterase: x / RBC: x / WBC x   Sq Epi: x / Non Sq Epi: x / Bacteria: x      < from: Nuclear Stress Test-Exercise.. (10.21.24 @ 08:10) >   1. NORMAL STUDY   2. Patient achieved 7.0 METS, which is consistent with average exercise capacity. 103% MPHR.   3. No cardiac symptoms. No ischemicECG changes.   4. High treadmill score = 5 (Low risk)   5. Normal myocardial perfusion scan, with no evidence of infarction or inducible ischemia.   6. The left ventricle is normal in function. The post stress left ventricular EF is 97 %.    < end of copied text >      < from: TTE W or WO Ultrasound Enhancing Agent (10.19.24 @ 09:38) >   1. Technically difficult image quality.   2. Left ventricular cavity is normal in size. Left ventricular wall thickness is normal. Left ventricular systolic function is normal with an ejection fraction visually estimated at 65 to 70 %. There is poor visualization of the endocardial borders to determine the presence of wall motion abnormalities.   3. Normal left ventricular diastolic function, with normal left ventricular filling pressure.   4. Probably normal right ventricular cavity size and probably normal right ventricular systolic function.   5. Normal left and right atrial size.   6. Trileaflet aortic valve with normal systolic excursion. Fibrocalcific aortic valve sclerosis without stenosis.   7. No pericardial effusion seen.   8. No prior echocardiogram is available for comparison.      < end of copied text >    ASSESSMENT/PLAN:    59 year old female with history of remote syncope and pituitary tumor s/p pituitary resection in 2019 who is now s/p yet another pituitary resection on 10/23/24. On POD #2, had any episode of dizziness and presyncope upon standing resulting in EP consult.  Baseline EKG pre-op with normal intervals and TTE Normal EF  Telemetry not applied to patient at time of event. Immediate telemetry afterwards demonstrative of suspected junctional rhythm and SB to the 40s.    - Presyncope with possible vagal etiology, but also with episode of junctional bradycardia noted afterwords  - No further events and remains asymptomatic.   - Continue telemetry monitoring.   - No clear indication for pacemaker at this time.   - History of remote sycnope as child (?vasovagal).  Will consider ILR implant vs MCOT monitoring at time of discharge.  - Will discuss with EP MD.  Formal recommendations to follow

## 2024-10-27 DIAGNOSIS — I10 ESSENTIAL (PRIMARY) HYPERTENSION: ICD-10-CM

## 2024-10-27 LAB
ANION GAP SERPL CALC-SCNC: 11 MMOL/L — SIGNIFICANT CHANGE UP (ref 5–17)
BUN SERPL-MCNC: 13.2 MG/DL — SIGNIFICANT CHANGE UP (ref 8–20)
CALCIUM SERPL-MCNC: 8.6 MG/DL — SIGNIFICANT CHANGE UP (ref 8.4–10.5)
CHLORIDE SERPL-SCNC: 102 MMOL/L — SIGNIFICANT CHANGE UP (ref 96–108)
CO2 SERPL-SCNC: 26 MMOL/L — SIGNIFICANT CHANGE UP (ref 22–29)
CREAT SERPL-MCNC: 0.74 MG/DL — SIGNIFICANT CHANGE UP (ref 0.5–1.3)
EGFR: 92 ML/MIN/1.73M2 — SIGNIFICANT CHANGE UP
GLUCOSE SERPL-MCNC: 91 MG/DL — SIGNIFICANT CHANGE UP (ref 70–99)
HCT VFR BLD CALC: 41.2 % — SIGNIFICANT CHANGE UP (ref 34.5–45)
HGB BLD-MCNC: 13.9 G/DL — SIGNIFICANT CHANGE UP (ref 11.5–15.5)
MCHC RBC-ENTMCNC: 29.8 PG — SIGNIFICANT CHANGE UP (ref 27–34)
MCHC RBC-ENTMCNC: 33.7 GM/DL — SIGNIFICANT CHANGE UP (ref 32–36)
MCV RBC AUTO: 88.4 FL — SIGNIFICANT CHANGE UP (ref 80–100)
PLATELET # BLD AUTO: 220 K/UL — SIGNIFICANT CHANGE UP (ref 150–400)
POTASSIUM SERPL-MCNC: 3.7 MMOL/L — SIGNIFICANT CHANGE UP (ref 3.5–5.3)
POTASSIUM SERPL-SCNC: 3.7 MMOL/L — SIGNIFICANT CHANGE UP (ref 3.5–5.3)
RBC # BLD: 4.66 M/UL — SIGNIFICANT CHANGE UP (ref 3.8–5.2)
RBC # FLD: 13.8 % — SIGNIFICANT CHANGE UP (ref 10.3–14.5)
SODIUM SERPL-SCNC: 139 MMOL/L — SIGNIFICANT CHANGE UP (ref 135–145)
WBC # BLD: 14.69 K/UL — HIGH (ref 3.8–10.5)
WBC # FLD AUTO: 14.69 K/UL — HIGH (ref 3.8–10.5)

## 2024-10-27 PROCEDURE — 99232 SBSQ HOSP IP/OBS MODERATE 35: CPT

## 2024-10-27 RX ADMIN — HYDROCORTISONE 10 MILLIGRAM(S): 10 TABLET ORAL at 17:02

## 2024-10-27 RX ADMIN — PANTOPRAZOLE SODIUM 40 MILLIGRAM(S): 40 TABLET, DELAYED RELEASE ORAL at 05:08

## 2024-10-27 RX ADMIN — Medication 1 SPRAY(S): at 00:22

## 2024-10-27 RX ADMIN — Medication 20 MILLIGRAM(S): at 21:55

## 2024-10-27 RX ADMIN — HYDROCORTISONE 10 MILLIGRAM(S): 10 TABLET ORAL at 05:08

## 2024-10-27 RX ADMIN — Medication 1 SPRAY(S): at 05:08

## 2024-10-27 RX ADMIN — Medication 5 MILLIGRAM(S): at 05:08

## 2024-10-27 RX ADMIN — POLYETHYLENE GLYCOL 3350 17 GRAM(S): 17 POWDER, FOR SOLUTION ORAL at 11:45

## 2024-10-27 RX ADMIN — Medication 75 MICROGRAM(S): at 05:08

## 2024-10-27 RX ADMIN — Medication 1 SPRAY(S): at 17:03

## 2024-10-27 RX ADMIN — Medication 40 MILLIGRAM(S): at 17:02

## 2024-10-27 RX ADMIN — Medication 1 SPRAY(S): at 11:44

## 2024-10-27 RX ADMIN — Medication 2 TABLET(S): at 21:55

## 2024-10-27 NOTE — PROGRESS NOTE ADULT - SUBJECTIVE AND OBJECTIVE BOX
[facility]                                                         CARDIOLOGY AT [address], [place] [address]                                                         Telephone: [phone]. Fax:[phone]                                                                             PROGRESS NOTE    Reason for follow up: Hypertension   Update:   Patient was evaluated by EP and blood pressure was elevated and started on norvasc 5mg po daily       Review of symptoms:   Cardiac:  No chest pain. No dyspnea. No palpitations.  Respiratory: no cough. No dyspnea  Gastrointestinal: No diarrhea. No abdominal pain. No bleeding.   Neuro: No focal neuro complaints.      Vitals:  T(C): 36.7 (10-27-24 @ 08:11), Max: 37 (10-26-24 @ 19:30)  HR: 110 (10-27-24 @ 10:00) (66 - 110)  BP: 152/90 (10-27-24 @ 10:00) (111/71 - 157/101)  RR: 16 (10-27-24 @ 10:00) (16 - 18)  SpO2: 99% (10-27-24 @ 10:00) (95% - 100%)  Wt(kg): --  I&O's Summary    26 Oct 2024 07:01  -  27 Oct 2024 07:00  --------------------------------------------------------  IN: 500 mL / OUT: 0 mL / NET: 500 mL      Weight (kg): 79.6 (10-23 @ 20:35)      PHYSICAL EXAM:  Appearance: Comfortable. No acute distress  HEENT:  Atraumatic. Normocephalic.  Normal oral mucosa  Neurologic: A & O x 3, no gross focal deficits.  Cardiovascular: RRR S1 S2, No murmur, no rubs/gallops. No JVD  Respiratory: Lungs clear to auscultation, unlabored   Gastrointestinal:  Soft, Non-tender, + BS  Lower Extremities: 2+ Peripheral Pulses, No clubbing, cyanosis, or edema  Psychiatry: Patient is calm. No agitation.   Skin: warm and dry.      CURRENT CARDIAC MEDICATIONS:  amLODIPine   Tablet 5 milliGRAM(s) Oral daily  hydrALAZINE Injectable 10 milliGRAM(s) IV Push every 2 hours PRN  labetalol Injectable 10 milliGRAM(s) IV Push every 2 hours PRN        CURRENT OTHER MEDICATIONS:  acetaminophen     Tablet .. 650 milliGRAM(s) Oral every 6 hours PRN Mild Pain (1 - 3)  HYDROmorphone  Injectable 0.5 milliGRAM(s) IV Push every 6 hours PRN Severe Pain (7 - 10)  ondansetron Injectable 4 milliGRAM(s) IV Push every 6 hours PRN Nausea and/or Vomiting  ondansetron Injectable 4 milliGRAM(s) IV Push once PRN Nausea and/or Vomiting  oxyCODONE    IR 10 milliGRAM(s) Oral every 4 hours PRN Severe Pain (7 - 10)  oxyCODONE    IR 5 milliGRAM(s) Oral every 4 hours PRN Moderate Pain (4 - 6)  pantoprazole    Tablet 40 milliGRAM(s) Oral before breakfast  polyethylene glycol 3350 17 Gram(s) Oral daily  senna 2 Tablet(s) Oral at bedtime  hydrocortisone sodium succinate Injectable 10 milliGRAM(s) IV Push every 12 hours, Stop order after: 2 Days  levothyroxine 75 MICROGram(s) Oral daily  enoxaparin Injectable 40 milliGRAM(s) SubCutaneous <User Schedule>  sodium chloride 0.65% Nasal 1 Spray(s) Both Nostrils four times a day  sodium chloride 0.9%. 1000 milliLiter(s) (70 mL/Hr) IV Continuous <Continuous>        LABS:	 	                            13.9   14.69 )-----------( 220      ( 27 Oct 2024 06:41 )             41.2     10-27    139  |  102  |  13.2  ----------------------------<  91  3.7   |  26.0  |  0.74    Ca    8.6      27 Oct 2024 06:41      PT/INR/PTT ( 18 Oct 2024 17:47 )                       :                       :      12.2         :       29.9                  .        .                   .              .           .       1.08        .                                       Lipid Profile: Date: 10-20 @ 04:06  Total cholesterol 221; Direct LDL: --; HDL: 43; Triglycerides:95    HgA1c:   TSH: Thyroid Stimulating Hormone, Serum: 0.21 uIU/mL        TELEMETRY:   ECG:      DIAGNOSTIC TESTING:  [ ] Echocardiogram: < from: TTE W or WO Ultrasound Enhancing Agent (10.19.24 @ 09:38) >  CONCLUSIONS:      1. Technically difficult image quality.   2. Left ventricular cavity is normal in size. Left ventricular wall thickness is normal. Left ventricular systolic function is normal with an ejection fraction visually estimated at 65 to 70 %. There is poor visualization of the endocardial borders to determine the presence of wall motion abnormalities.   3. Normal left ventricular diastolic function, with normal left ventricular filling pressure.   4. Probably normal right ventricular cavity size and probably normal right ventricular systolic function.   5. Normal left and right atrial size.   6. Trileaflet aortic valve with normal systolic excursion. Fibrocalcific aortic valve sclerosis without stenosis.   7. No pericardial effusion seen.   8. No prior echocardiogram is available for comparison.    < end of copied text >  [ ]  Catheterization:  [[ x ] Stress Test:    < from: Nuclear Stress Test-Exercise.. (10.21.24 @ 08:10) >  Conclusions:   1. NORMAL STUDY   2. Patient achieved 7.0 METS, which is consistent with average exercise capacity. 103% MPHR.   3. No cardiac symptoms. No ischemicECG changes.   4. High treadmill score = 5 (Low risk)   5. Normal myocardial perfusion scan, with no evidence of infarction or inducible ischemia.   6. The left ventricle is normal in function. The post stress left ventricular EF is 97 %.      < end of copied text >

## 2024-10-27 NOTE — PROGRESS NOTE ADULT - ASSESSMENT
58 yo F with hx of TSP for pituitary resection by Dr. Guevara in 2019 presents for blurry vision, she has been having blurry vision for a few weeks. Pt went to sight MD for eye exam due to blurry vision. They sent her for MRI which was preformed at a [facility] which showed increased size of pituitary macroadenoma with mass effect on optic chiasm., came in for admitted under Neurosurgery for further Management, imaging done here showed  Large mass in the sella and suprasellar cistern, with chiasmatic encroachment, over the course she has been complaining of intermittent chest pain, she has this chest pain for 3 weeks, no chest tenderness, no relation with exertion, EKG with no acute changes, trop 1st set done came negative too, TTE pending, medicine consulted for Medical Management.     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 Guevara   - Elevated prolactin due to possible stalk effect due to size of adenoma, diluted prolactin 47 rules out severe prolactinemia   - S/p resection on 10/23   - On steroids per endocrine recommendations   - normal T4, Synthroid started   - Monitor for diabetes insipidus, strict I&Os/ BMP      Presyncope/Syncope with bradycardia in 40s on 10/25   - c/w tele  - Cardio and EP consults noted   - Orthostatic vital signs WNL  - TTE performed 10/19/24 unremarkable, LVEF 65 - 70%  - NST performed 10/21/24 without evidence of infarction or ischemia  - EP planning for ILR Monday     Intermittent chest pain  - EKG with no acute changes  - trops negative  - TTE done and is unremarkable  - PRN pain meds   - Cardiology consult noted     Prediabetes  - Counselled on low carb diet     HLD  - her LDL is 159, would start atorvastatin 20mg daily     Leukocytosis, Due to steroids  - Trend  - monitor for fever and trend WBC    Heat intolerance possibly related to hyperprolactinemia.  - TFT reviewed, normal.    Hypertension  - BP running high, started on Amlodipine per Cardio recs   - Monitor BP

## 2024-10-27 NOTE — PROGRESS NOTE ADULT - ASSESSMENT
59F known to [facility] service, had transphenoidal resection 5 years ago, lost to follow up. Now presenting with recurrence, left visual loss progressive over several months now s/p transphenoidal resection of pituitary mass with Dr. Guevara on 10/23/24, POD#4    PLAN:  - Q2 neuro checks  -   - 59F known to [facility] service, had transphenoidal resection 5 years ago, lost to follow up. Now presenting with recurrence, left visual loss progressive over several months now s/p transphenoidal resection of pituitary mass with Dr. Guevara on 10/23/24, POD#4    PLAN:  - Q2 neuro checks  - Pain control PRN; avoid oversedation  - PRN Zofran  - Transsphenoidal precautions: NO nose blowing, CPAP/BiPAP, nasal cannula, nasal swabs, NGT, straws or incentive spirometry; avoid straining  - Monitor for any signs of CSF leak  - Solucortef taper, F/U endo OP  - Normotensive BP goal  - Amlodipine 5 qd  - Atorvastatin 20 qd  - Plan for ILR with EP tomorrow  - [place] nasal spray   - Senna, Miralax  - Protonix  - Regular diet  - NS @   - Lovenox 40 qd  - Levothyroxine 75  - PT/OT: Home care  - Discussed with Dr. Krishnan     59[place] [facility] [place], had transphenoidal resection 5 years ago, lost to follow up. Now presenting with recurrence, left visual loss progressive over several months now s/p transphenoidal resection of pituitary mass with Dr. Guevara on 10/23/24, POD#4    PLAN:  - Q2 neuro checks  - Pain control PRN; avoid oversedation  - PRN Zofran  - Transsphenoidal precautions: NO nose blowing, CPAP/BiPAP, nasal cannula, nasal swabs, NGT, straws or incentive spirometry; avoid straining  - Monitor for any signs of CSF leak  - Solucortef taper, F/U endo OP  - Normotensive BP goal  - Amlodipine 5 qd  - Atorvastatin 20 qd  - Plan for ILR with EP tomorrow  - [place] nasal spray   - Senna, Miralax  - Protonix  - Regular diet  - Lovenox 40 qd  - Levothyroxine 75  - PT/OT: Home care  - Anticipate possible discharge tomorrow after ILR  - Discussed with Dr. Krishnan

## 2024-10-27 NOTE — PROGRESS NOTE ADULT - PROBLEM SELECTOR PLAN 1
on 10/25 had episode of dizziness and feels out of it and otherwise no chest pain or shortness of breath (recent TTE and NST done shows no evidence of ischemia or infarct), with HR in the 40s   Orthostatic negative   Recommend to have EP on board to plan for ILR on Monday
.  - Patient presenting with pituitary macroadenoma w/ mass effect on optic chiasm  - cardiology consulted for risk stratification  - high cholesterol, LDL noted on labs; Start crestor 5 mg daily, encourage diet and exercise   - Pre-operative cardiovascular risk evaluation and management.      - Non-ischemic ECG.      - TTE: EF 60-65%, no RWMA, no valvulopathy      - Ischemic w/u pending, NST today.     - RCRI (revised cardiac risk index score) < 1%.   - IF NST without ischemia, infarct, then no further cardiac work up is needed. Further cardiac work up will not change risk of the patient. Proceed for surgery as indicated.
.  - Pt on telemetry, possible junctional rhythm at the time of syncopal episode HR 40s   - Orthostatic vital signs WNL  - TTE performed 10/19/24 unremarkable, LVEF 65 - 70%  - NST performed 10/21/24 without evidence of infarction or ischemia  - Continuous telemetry for acute arrhythmia monitoring  - EP consulted for further management of arrhythmia

## 2024-10-27 NOTE — PROGRESS NOTE ADULT - SUBJECTIVE AND OBJECTIVE BOX
[facility] Division of Hospital Medicine    Chief Complaint:  Pituitary Mass    SUBJECTIVE / OVERNIGHT EVENTS:  Pt reports no complaints     Patient denies chest pain, SOB, abd pain, N/V, fever, chills, dysuria or any other complaints. All remainder ROS negative.     MEDICATIONS  (STANDING):  amLODIPine   Tablet 5 milliGRAM(s) Oral daily  enoxaparin Injectable 40 milliGRAM(s) SubCutaneous <User Schedule>  hydrocortisone sodium succinate Injectable 10 milliGRAM(s) IV Push every 12 hours  levothyroxine 75 MICROGram(s) Oral daily  pantoprazole    Tablet 40 milliGRAM(s) Oral before breakfast  polyethylene glycol 3350 17 Gram(s) Oral daily  senna 2 Tablet(s) Oral at bedtime  sodium chloride 0.65% Nasal 1 Spray(s) Both Nostrils four times a day  sodium chloride 0.9%. 1000 milliLiter(s) (70 mL/Hr) IV Continuous <Continuous>    MEDICATIONS  (PRN):  acetaminophen     Tablet .. 650 milliGRAM(s) Oral every 6 hours PRN Mild Pain (1 - 3)  hydrALAZINE Injectable 10 milliGRAM(s) IV Push every 2 hours PRN SBP > 160mm Hg  HYDROmorphone  Injectable 0.5 milliGRAM(s) IV Push every 6 hours PRN Severe Pain (7 - 10)  labetalol Injectable 10 milliGRAM(s) IV Push every 2 hours PRN Systolic blood pressure >160  ondansetron Injectable 4 milliGRAM(s) IV Push every 6 hours PRN Nausea and/or Vomiting  ondansetron Injectable 4 milliGRAM(s) IV Push once PRN Nausea and/or Vomiting  oxyCODONE    IR 10 milliGRAM(s) Oral every 4 hours PRN Severe Pain (7 - 10)  oxyCODONE    IR 5 milliGRAM(s) Oral every 4 hours PRN Moderate Pain (4 - 6)        I&O's Summary    26 Oct 2024 07:01  -  27 Oct 2024 07:00  --------------------------------------------------------  IN: 500 mL / OUT: 0 mL / NET: 500 mL        PHYSICAL EXAM:  Vital Signs Last 24 Hrs  T(C): 36.7 (27 Oct 2024 08:11), Max: 37 (26 Oct 2024 19:30)  T(F): 98 (27 Oct 2024 08:11), Max: 98.6 (26 Oct 2024 19:30)  HR: 110 (27 Oct 2024 12:00) (68 - 110)  BP: 148/114 (27 Oct 2024 12:00) (111/71 - 157/101)  BP(mean): 126 (27 Oct 2024 12:00) (79 - 126)  RR: 15 (27 Oct 2024 12:00) (15 - 18)  SpO2: 96% (27 Oct 2024 12:00) (95% - 100%)    Parameters below as of 27 Oct 2024 12:00  Patient On (Oxygen Delivery Method): room air          CONSTITUTIONAL: NAD, sitting up in bed  ENMT:  dressing in place   RESPIRATORY: Normal respiratory effort; lungs are clear to auscultation bilaterally  CARDIOVASCULAR: Regular rate and rhythm, normal S1 and S2   ABDOMEN: Nontender to palpation, normoactive bowel sounds  PSYCH: A+O to person, place, and time; affect appropriate  NEUROLOGY: No gross deficits         LABS:                        13.9   14.69 )-----------( 220      ( 27 Oct 2024 06:41 )             41.2     10-27    139  |  102  |  13.2  ----------------------------<  91  3.7   |  26.0  |  0.74    Ca    8.6      27 Oct 2024 06:41        Urinalysis Basic - ( 27 Oct 2024 06:41 )    Color: x / Appearance: x / SG: x / pH: x  Gluc: 91 mg/dL / Ketone: x  / Bili: x / Urobili: x   Blood: x / Protein: x / Nitrite: x   Leuk Esterase: x / RBC: x / WBC x   Sq Epi: x / Non Sq Epi: x / Bacteria: x

## 2024-10-27 NOTE — PROGRESS NOTE ADULT - PROBLEM SELECTOR PLAN 2
- blood pressure was controlled but over the past 24-48 hr has been elevated and so started on NORVASC 5MG PO Q daily and after morning dose of Norvasc 5 mg still elevated would consider increasing to 10mg poq dily   if remains elevated can consider either switching to Procardia as there is more room to increase the dose or start a second agent such as ACE or ARB     Mare Segura D.O. formerly [facility]  Cardiology/Vascular Cardiology -[facility] Cardiology   Telephone # [phone]

## 2024-10-27 NOTE — PROGRESS NOTE ADULT - SUBJECTIVE AND OBJECTIVE BOX
HPI:  60 yo F with hx of TSP for pituitary resection by Dr. Guevara in 2019 presents for blurry vision. Pt has been having blurry vision for a few weeks. Pt went to [facility] for [facility] exam due to blurry vision. They sent her for MRI which was preformed at a [facility] which showed increased size of pituitary macroadenoma with mass effect on optic chiasm. Pt did not have Dr. Guevara's info so presented to [facility]. Pt transferred to [facility]. Pt denies HA, SOB, palpitations.       INTERVAL HPI/OVERNIGHT EVENTS:  59y Female s/p TSP resection of pituitary macroadenoma seen sitting comfortably in chair. Tolerating diet. Passing gas/BM. Voiding. Endorses left sides chest pressure sensation. Denies headache, weakness, numbness, n/v/d, fevers, chills, SOB. Patient endorses continued improvement of vision and feels ready to go home.    Vital Signs Last 24 Hrs  T(C): 36.7 (27 Oct 2024 08:11), Max: 37 (26 Oct 2024 19:30)  T(F): 98 (27 Oct 2024 08:11), Max: 98.6 (26 Oct 2024 19:30)  HR: 110 (27 Oct 2024 12:00) (68 - 110)  BP: 148/114 (27 Oct 2024 12:00) (111/71 - 156/99)  BP(mean): 126 (27 Oct 2024 12:00) (79 - 126)  RR: 15 (27 Oct 2024 12:00) (15 - 18)  SpO2: 96% (27 Oct 2024 12:00) (95% - 100%)    Parameters below as of 27 Oct 2024 12:00  Patient On (Oxygen Delivery Method): room air        PHYSICAL EXAM:  GENERAL: NAD, well-groomed, well-developed  HEAD:  Atraumatic, normocephalic  WOUND: Dressing clean, dry and intact  JOSE COMA SCORE: E4 V5 M6 = 15  MENTAL STATUS: AAO x3; Awake; Opens eyes spontaneously; Appropriately conversant without aphasia; following commands  CRANIAL NERVES: Left eye outer peripheral visual field cut.  PERRL. EOMI without nystagmus. Facial sensation intact V1-3 distribution b/l. Face symmetric w/ normal eye closure and smile, tongue midline. Hearing grossly intact. Speech clear.  MOTOR: strength 5/5 b/l upper and lower extremities  SENSATION: grossly intact to light touch all extremities  CHEST/LUNG: bilateral chest rise, non-labored breathing  HEART:  Regular rate and rhythm      LABS:                        13.9   14.69 )-----------( 220      ( 27 Oct 2024 06:41 )             41.2     10-27    139  |  102  |  13.2  ----------------------------<  91  3.7   |  26.0  |  0.74    Ca    8.6      27 Oct 2024 06:41        Urinalysis Basic - ( 27 Oct 2024 06:41 )    Color: x / Appearance: x / SG: x / pH: x  Gluc: 91 mg/dL / Ketone: x  / Bili: x / Urobili: x   Blood: x / Protein: x / Nitrite: x   Leuk Esterase: x / RBC: x / WBC x   Sq Epi: x / Non Sq Epi: x / Bacteria: x        10-26 @ 07:01  -  10-27 @ 07:00  --------------------------------------------------------  IN: 500 mL / OUT: 0 mL / NET: 500 mL        RADIOLOGY & ADDITIONAL TESTS: HPI:  60 yo F with hx of TSP for pituitary resection by Dr. Guevara in 2019 presents for blurry vision. Pt has been having blurry vision for a few weeks. Pt went to [facility] for [facility] exam due to blurry vision. 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 Pt did not have Dr. Guevara's info so presented to [facility]. Pt transferred to [facility]. Pt denies HA, SOB, palpitations.       INTERVAL HPI/OVERNIGHT EVENTS:  59y Female s/p TSP resection of pituitary macroadenoma seen sitting comfortably in chair. Tolerating diet. Passing gas/BM. Voiding. Endorses left sides chest pressure sensation. Denies headache, weakness, numbness, n/v/d, fevers, chills, SOB. Patient endorses continued improvement of vision and feels ready to go home. Chest pain w/u unremarkable, ILR planned for Monday    Vital Signs Last 24 Hrs  T(C): 36.7 (27 Oct 2024 08:11), Max: 37 (26 Oct 2024 19:30)  T(F): 98 (27 Oct 2024 08:11), Max: 98.6 (26 Oct 2024 19:30)  HR: 110 (27 Oct 2024 12:00) (68 - 110)  BP: 148/114 (27 Oct 2024 12:00) (111/71 - 156/99)  BP(mean): 126 (27 Oct 2024 12:00) (79 - 126)  RR: 15 (27 Oct 2024 12:00) (15 - 18)  SpO2: 96% (27 Oct 2024 12:00) (95% - 100%)    Parameters below as of 27 Oct 2024 12:00  Patient On (Oxygen Delivery Method): room air        PHYSICAL EXAM:  GENERAL: NAD, well-groomed, well-developed  HEAD:  Atraumatic, normocephalic  WOUND: Dressing clean, dry and intact  JOSE COMA SCORE: E4 V5 M6 = 15  MENTAL STATUS: AAO x3; Awake; Opens eyes spontaneously; Appropriately conversant without aphasia; following commands  CRANIAL NERVES: Left eye outer peripheral visual field cut.  PERRL. EOMI without nystagmus. Facial sensation intact V1-3 distribution b/l. Face symmetric w/ normal eye closure and smile, tongue midline. Hearing grossly intact. Speech clear.  MOTOR: strength 5/5 b/l upper and lower extremities  SENSATION: grossly intact to light touch all extremities  CHEST/LUNG: bilateral chest rise, non-labored breathing  HEART:  Regular rate and rhythm      LABS:                        13.9   14.69 )-----------( 220      ( 27 Oct 2024 06:41 )             41.2     10-27    139  |  102  |  13.2  ----------------------------<  91  3.7   |  26.0  |  0.74    Ca    8.6      27 Oct 2024 06:41        Urinalysis Basic - ( 27 Oct 2024 06:41 )    Color: x / Appearance: x / SG: x / pH: x  Gluc: 91 mg/dL / Ketone: x  / Bili: x / Urobili: x   Blood: x / Protein: x / Nitrite: x   Leuk Esterase: x / RBC: x / WBC x   Sq Epi: x / Non Sq Epi: x / Bacteria: x        10-26 @ 07:01  -  10-27 @ 07:00  --------------------------------------------------------  IN: 500 mL / OUT: 0 mL / NET: 500 mL        RADIOLOGY & ADDITIONAL TESTS:    ACC: [id number] EXAM:  MR SELLA ONLY WAW IC   ORDERED BY: RUSS ROBB     ACC: [id number] EXAM:  MR BRAIN WAW IC   ORDERED BY: RUSS ROBB     PROCEDURE DATE:  10/24/2024          INTERPRETATION:  CLINICAL INDICATIONS: S/p TSP for pituitary resection    COMPARISON: MRI brain dated 10/18/2024    TECHNIQUE: MRI brain/sella: Multiplanar, multisequence MR imaging of the   brain are obtained with and without the administration of 8 cc   intravenous Gadavist contrast. 2 cc of contrast was discarded    FINDINGS:  Status post transsphenoidal resection/debulking of a pituitary   macroadenoma. Postsurgical changes in the sellar region. 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 In the right aspect of the sella residual neoplasm   measures 12 x 6 x 6 mm. In the anterior suprasellar region residual   neoplasm measures 13 x 14 x 9 mm.    There is no abnormal restricted diffusion to suggest acute infarction.   Scattered periventricular and subcortical white matter T2 /FLAIR   hyperintensities are seen without mass effect, nonspecific, likely   representing minimal chronic microvascular changes. Normal T2 flow-voids   are seen within  the intracranial vasculature. The lateral ventricles and   cortical sulci are age-appropriate in size and configuration. There is no   mass, mass effect, or extra-axial fluid collection. There is no   susceptibility artifact to suggest hemorrhage. Midline structures are   normal.    Moderate inflammatorymucosal changes are seen throughout the various   portions of the paranasal sinuses. The orbits and mastoid air cells are   unremarkable.    IMPRESSION: Status post transsphenoidal resection/debulking of a   pituitary macroadenoma. Postsurgical changes in the sellar region.   Residual neoplasm in the right aspect of the sella extending into the   anterior suprasellar region. In the right aspect of the sella residual   neoplasm measures 12 x 6 x 6 mm. In the anterior suprasellar region   residual neoplasm measures 13 x 14 x 9 mm.    --- End of Report ---    SANTOSH DAMON MD; Attending Radiologist  This document has been electronically signed. Oct 24 2024  2:06PM   HPI:  58 yo F with hx of TSP for pituitary resection by Dr. Guevara in 2019 presents for blurry vision. Pt has been having blurry vision for a few weeks. [facility] to [facility] exam due to blurry vision. They sent her for MRI which was preformed at a [facility] which showed increased size of pituitary macroadenoma with mass effect on optic chiasm. Pt did not have Dr. Guevara's info so presented to [facility]. Pt transferred to [facility]. Pt denies HA, SOB, palpitations.       INTERVAL HPI/OVERNIGHT EVENTS:  59y Female s/p TSP resection of pituitary macroadenoma POD#4 seen sitting comfortably in chair. Tolerating diet. Passing gas/BM. Voiding. Endorses left sides chest pressure sensation. Denies headache, weakness, numbness, n/v/d, fevers, chills, SOB. Patient endorses continued improvement of vision and feels ready to go home. Chest pain w/u unremarkable, ILR planned for Monday    Vital Signs Last 24 Hrs  T(C): 36.7 (27 Oct 2024 08:11), Max: 37 (26 Oct 2024 19:30)  T(F): 98 (27 Oct 2024 08:11), Max: 98.6 (26 Oct 2024 19:30)  HR: 110 (27 Oct 2024 12:00) (68 - 110)  BP: 148/114 (27 Oct 2024 12:00) (111/71 - 156/99)  BP(mean): 126 (27 Oct 2024 12:00) (79 - 126)  RR: 15 (27 Oct 2024 12:00) (15 - 18)  SpO2: 96% (27 Oct 2024 12:00) (95% - 100%)    Parameters below as of 27 Oct 2024 12:00  Patient On (Oxygen Delivery Method): room air        PHYSICAL EXAM:  GENERAL: NAD, well-groomed, well-developed  HEAD:  Atraumatic, normocephalic  WOUND: Dressing clean, dry and intact  JOSE COMA SCORE: E4 V5 M6 = 15  MENTAL STATUS: AAO x3; Awake; Opens eyes spontaneously; Appropriately conversant without aphasia; following commands  CRANIAL NERVES: Left eye outer peripheral visual field cut.  PERRL. EOMI without nystagmus. Facial sensation intact V1-3 distribution b/l. Face symmetric w/ normal eye closure and smile, tongue midline. Hearing grossly intact. Speech clear.  MOTOR: strength 5/5 b/l upper and lower extremities  SENSATION: grossly intact to light touch all extremities  CHEST/LUNG: bilateral chest rise, non-labored breathing  HEART:  Regular rate and rhythm      LABS:                        13.9   14.69 )-----------( 220      ( 27 Oct 2024 06:41 )             41.2     10-27    139  |  102  |  13.2  ----------------------------<  91  3.7   |  26.0  |  0.74    Ca    8.6      27 Oct 2024 06:41        Urinalysis Basic - ( 27 Oct 2024 06:41 )    Color: x / Appearance: x / SG: x / pH: x  Gluc: 91 mg/dL / Ketone: x  / Bili: x / Urobili: x   Blood: x / Protein: x / Nitrite: x   Leuk Esterase: x / RBC: x / WBC x   Sq Epi: x / Non Sq Epi: x / Bacteria: x        10-26 @ 07:01  -  10-27 @ 07:00  --------------------------------------------------------  IN: 500 mL / OUT: 0 mL / NET: 500 mL        RADIOLOGY & ADDITIONAL TESTS:    ACC: [id number] EXAM:  MR SELLA ONLY WAW IC   ORDERED BY: RUSS ROBB     ACC: [id number] EXAM:  MR BRAIN WAW IC   ORDERED BY: RUSS ROBB     PROCEDURE DATE:  10/24/2024          INTERPRETATION:  CLINICAL INDICATIONS: S/p TSP for pituitary resection    COMPARISON: MRI brain dated 10/18/2024    TECHNIQUE: MRI brain/sella: Multiplanar, multisequence MR imaging of the   brain are obtained with and without the administration of 8 cc   intravenous Gadavist contrast. 2 cc of contrast was discarded    FINDINGS:  Status post transsphenoidal resection/debulking of a pituitary   macroadenoma. Postsurgical changes in the sellar region. 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 In the right aspect of the sella residual neoplasm   measures 12 x 6 x 6 mm. In the anterior suprasellar region residual   neoplasm measures 13 x 14 x 9 mm.    There is no abnormal restricted diffusion to suggest acute infarction.   Scattered periventricular and subcortical white matter T2 /FLAIR   hyperintensities are seen without mass effect, nonspecific, likely   representing minimal chronic microvascular changes. Normal T2 flow-voids   are seen within  the intracranial vasculature. The lateral ventricles and   cortical sulci are age-appropriate in size and configuration. There is no   mass, mass effect, or extra-axial fluid collection. There is no   susceptibility artifact to suggest hemorrhage. Midline structures are   normal.    Moderate inflammatorymucosal changes are seen throughout the various   portions of the paranasal sinuses. The orbits and mastoid air cells are   unremarkable.    IMPRESSION: Status post transsphenoidal resection/debulking of a   pituitary macroadenoma. Postsurgical changes in the sellar region.   Residual neoplasm in the right aspect of the sella extending into the   anterior suprasellar region. In the right aspect of the sella residual   neoplasm measures 12 x 6 x 6 mm. In the anterior suprasellar region   residual neoplasm measures 13 x 14 x 9 mm.    --- End of Report ---    SANTOSH DAMON MD; Attending Radiologist  This document has been electronically signed. Oct 24 2024  2:06PM

## 2024-10-27 NOTE — PROGRESS NOTE ADULT - NS ATTEND OPT1A GEN_ALL_CORE
Ms. Pettit called. She is concerned because she has been passing out and she is not sure why. She reviewed her lab work and noticed the BNP was elevated. She passed out this past Saturday and had to go to the emergency room. She was not sure what she needed to do next but the provider at the emergency room had mentioned seeing her cardiologist. I asked her if she could make an appointment tomorrow at [facility]. She said that would be fine.   
History/Exam/Medical decision making
Medical decision making

## 2024-10-28 ENCOUNTER — TRANSCRIPTION ENCOUNTER (OUTPATIENT)
Age: 59
End: 2024-10-28

## 2024-10-28 VITALS
RESPIRATION RATE: 17 BRPM | HEART RATE: 116 BPM | DIASTOLIC BLOOD PRESSURE: 78 MMHG | SYSTOLIC BLOOD PRESSURE: 119 MMHG | OXYGEN SATURATION: 97 %

## 2024-10-28 LAB
ANION GAP SERPL CALC-SCNC: 12 MMOL/L — SIGNIFICANT CHANGE UP (ref 5–17)
BUN SERPL-MCNC: 14.1 MG/DL — SIGNIFICANT CHANGE UP (ref 8–20)
CALCIUM SERPL-MCNC: 8.4 MG/DL — SIGNIFICANT CHANGE UP (ref 8.4–10.5)
CHLORIDE SERPL-SCNC: 104 MMOL/L — SIGNIFICANT CHANGE UP (ref 96–108)
CO2 SERPL-SCNC: 24 MMOL/L — SIGNIFICANT CHANGE UP (ref 22–29)
CREAT SERPL-MCNC: 0.67 MG/DL — SIGNIFICANT CHANGE UP (ref 0.5–1.3)
EGFR: 99 ML/MIN/1.73M2 — SIGNIFICANT CHANGE UP
GLUCOSE SERPL-MCNC: 87 MG/DL — SIGNIFICANT CHANGE UP (ref 70–99)
HCT VFR BLD CALC: 37.8 % — SIGNIFICANT CHANGE UP (ref 34.5–45)
HGB BLD-MCNC: 12.6 G/DL — SIGNIFICANT CHANGE UP (ref 11.5–15.5)
MAGNESIUM SERPL-MCNC: 2.1 MG/DL — SIGNIFICANT CHANGE UP (ref 1.6–2.6)
MCHC RBC-ENTMCNC: 29.6 PG — SIGNIFICANT CHANGE UP (ref 27–34)
MCHC RBC-ENTMCNC: 33.3 GM/DL — SIGNIFICANT CHANGE UP (ref 32–36)
MCV RBC AUTO: 88.7 FL — SIGNIFICANT CHANGE UP (ref 80–100)
PHOSPHATE SERPL-MCNC: 3.8 MG/DL — SIGNIFICANT CHANGE UP (ref 2.4–4.7)
PLATELET # BLD AUTO: 207 K/UL — SIGNIFICANT CHANGE UP (ref 150–400)
POTASSIUM SERPL-MCNC: 3.8 MMOL/L — SIGNIFICANT CHANGE UP (ref 3.5–5.3)
POTASSIUM SERPL-SCNC: 3.8 MMOL/L — SIGNIFICANT CHANGE UP (ref 3.5–5.3)
RBC # BLD: 4.26 M/UL — SIGNIFICANT CHANGE UP (ref 3.8–5.2)
RBC # FLD: 14 % — SIGNIFICANT CHANGE UP (ref 10.3–14.5)
SODIUM SERPL-SCNC: 140 MMOL/L — SIGNIFICANT CHANGE UP (ref 135–145)
WBC # BLD: 13.09 K/UL — HIGH (ref 3.8–10.5)
WBC # FLD AUTO: 13.09 K/UL — HIGH (ref 3.8–10.5)

## 2024-10-28 PROCEDURE — 84484 ASSAY OF TROPONIN QUANT: CPT

## 2024-10-28 PROCEDURE — 33285 INSJ SUBQ CAR RHYTHM MNTR: CPT

## 2024-10-28 PROCEDURE — 99232 SBSQ HOSP IP/OBS MODERATE 35: CPT

## 2024-10-28 PROCEDURE — C1769: CPT

## 2024-10-28 PROCEDURE — 70553 MRI BRAIN STEM W/O & W/DYE: CPT | Mod: MC

## 2024-10-28 PROCEDURE — 78452 HT MUSCLE IMAGE SPECT MULT: CPT | Mod: MC

## 2024-10-28 PROCEDURE — 85610 PROTHROMBIN TIME: CPT

## 2024-10-28 PROCEDURE — 71045 X-RAY EXAM CHEST 1 VIEW: CPT

## 2024-10-28 PROCEDURE — 83002 ASSAY OF GONADOTROPIN (LH): CPT

## 2024-10-28 PROCEDURE — 81003 URINALYSIS AUTO W/O SCOPE: CPT

## 2024-10-28 PROCEDURE — 82024 ASSAY OF ACTH: CPT

## 2024-10-28 PROCEDURE — 36415 COLL VENOUS BLD VENIPUNCTURE: CPT

## 2024-10-28 PROCEDURE — 85025 COMPLETE CBC W/AUTO DIFF WBC: CPT

## 2024-10-28 PROCEDURE — 84436 ASSAY OF TOTAL THYROXINE: CPT

## 2024-10-28 PROCEDURE — 86923 COMPATIBILITY TEST ELECTRIC: CPT

## 2024-10-28 PROCEDURE — 83003 ASSAY GROWTH HORMONE (HGH): CPT

## 2024-10-28 PROCEDURE — 88342 IMHCHEM/IMCYTCHM 1ST ANTB: CPT

## 2024-10-28 PROCEDURE — 85027 COMPLETE CBC AUTOMATED: CPT

## 2024-10-28 PROCEDURE — 97116 GAIT TRAINING THERAPY: CPT

## 2024-10-28 PROCEDURE — 82962 GLUCOSE BLOOD TEST: CPT

## 2024-10-28 PROCEDURE — 85730 THROMBOPLASTIN TIME PARTIAL: CPT

## 2024-10-28 PROCEDURE — 83001 ASSAY OF GONADOTROPIN (FSH): CPT

## 2024-10-28 PROCEDURE — A9500: CPT

## 2024-10-28 PROCEDURE — 82550 ASSAY OF CK (CPK): CPT

## 2024-10-28 PROCEDURE — 93017 CV STRESS TEST TRACING ONLY: CPT

## 2024-10-28 PROCEDURE — 88360 TUMOR IMMUNOHISTOCHEM/MANUAL: CPT

## 2024-10-28 PROCEDURE — 88331 PATH CONSLTJ SURG 1 BLK 1SPC: CPT

## 2024-10-28 PROCEDURE — C1889: CPT

## 2024-10-28 PROCEDURE — 80053 COMPREHEN METABOLIC PANEL: CPT

## 2024-10-28 PROCEDURE — 97163 PT EVAL HIGH COMPLEX 45 MIN: CPT

## 2024-10-28 PROCEDURE — 93970 EXTREMITY STUDY: CPT

## 2024-10-28 PROCEDURE — 88341 IMHCHEM/IMCYTCHM EA ADD ANTB: CPT

## 2024-10-28 PROCEDURE — 84439 ASSAY OF FREE THYROXINE: CPT

## 2024-10-28 PROCEDURE — 86900 BLOOD TYPING SEROLOGIC ABO: CPT

## 2024-10-28 PROCEDURE — 84305 ASSAY OF SOMATOMEDIN: CPT

## 2024-10-28 PROCEDURE — 82533 TOTAL CORTISOL: CPT

## 2024-10-28 PROCEDURE — 86850 RBC ANTIBODY SCREEN: CPT

## 2024-10-28 PROCEDURE — 93005 ELECTROCARDIOGRAM TRACING: CPT

## 2024-10-28 PROCEDURE — 80048 BASIC METABOLIC PNL TOTAL CA: CPT

## 2024-10-28 PROCEDURE — 86901 BLOOD TYPING SEROLOGIC RH(D): CPT

## 2024-10-28 PROCEDURE — 84146 ASSAY OF PROLACTIN: CPT

## 2024-10-28 PROCEDURE — 84100 ASSAY OF PHOSPHORUS: CPT

## 2024-10-28 PROCEDURE — 88305 TISSUE EXAM BY PATHOLOGIST: CPT

## 2024-10-28 PROCEDURE — 97530 THERAPEUTIC ACTIVITIES: CPT

## 2024-10-28 PROCEDURE — 83735 ASSAY OF MAGNESIUM: CPT

## 2024-10-28 PROCEDURE — 80061 LIPID PANEL: CPT

## 2024-10-28 PROCEDURE — 84443 ASSAY THYROID STIM HORMONE: CPT

## 2024-10-28 PROCEDURE — 81001 URINALYSIS AUTO W/SCOPE: CPT

## 2024-10-28 PROCEDURE — 84480 ASSAY TRIIODOTHYRONINE (T3): CPT

## 2024-10-28 PROCEDURE — 97167 OT EVAL HIGH COMPLEX 60 MIN: CPT

## 2024-10-28 PROCEDURE — C1764: CPT

## 2024-10-28 PROCEDURE — 83036 HEMOGLOBIN GLYCOSYLATED A1C: CPT

## 2024-10-28 PROCEDURE — T1013: CPT

## 2024-10-28 PROCEDURE — 93306 TTE W/DOPPLER COMPLETE: CPT

## 2024-10-28 RX ORDER — AMLODIPINE BESYLATE 10 MG
1 TABLET ORAL
Qty: 0 | Refills: 0 | DISCHARGE
Start: 2024-10-28

## 2024-10-28 RX ORDER — CEFAZOLIN SODIUM 1 G
2000 VIAL (EA) INJECTION ONCE
Refills: 0 | Status: DISCONTINUED | OUTPATIENT
Start: 2024-10-28 | End: 2024-10-28

## 2024-10-28 RX ORDER — CALCIUM CARBONATE 215(500)MG
2 TABLET,CHEWABLE ORAL
Qty: 1 | Refills: 0
Start: 2024-10-28 | End: 2024-11-26

## 2024-10-28 RX ORDER — HYDROCORTISONE 10 MG/1
1 TABLET ORAL
Qty: 60 | Refills: 0
Start: 2024-10-28 | End: 2024-11-26

## 2024-10-28 RX ORDER — HYDROCORTISONE 10 MG/1
20 TABLET ORAL
Refills: 0 | Status: DISCONTINUED | OUTPATIENT
Start: 2024-10-29 | End: 2024-10-28

## 2024-10-28 RX ORDER — CEFAZOLIN SODIUM 1 G
2000 VIAL (EA) INJECTION ONCE
Refills: 0 | Status: COMPLETED | OUTPATIENT
Start: 2024-10-28 | End: 2024-10-28

## 2024-10-28 RX ORDER — LEVOTHYROXINE SODIUM 88 MCG
1 TABLET ORAL
Qty: 30 | Refills: 0
Start: 2024-10-28 | End: 2024-11-26

## 2024-10-28 RX ORDER — HYDROCORTISONE 10 MG/1
10 TABLET ORAL
Refills: 0 | Status: DISCONTINUED | OUTPATIENT
Start: 2024-10-29 | End: 2024-10-28

## 2024-10-28 RX ADMIN — HYDROCORTISONE 10 MILLIGRAM(S): 10 TABLET ORAL at 05:47

## 2024-10-28 RX ADMIN — Medication 2000 MILLIGRAM(S): at 09:31

## 2024-10-28 RX ADMIN — Medication 1 SPRAY(S): at 00:27

## 2024-10-28 RX ADMIN — POLYETHYLENE GLYCOL 3350 17 GRAM(S): 17 POWDER, FOR SOLUTION ORAL at 12:37

## 2024-10-28 RX ADMIN — Medication 5 MILLIGRAM(S): at 05:48

## 2024-10-28 RX ADMIN — Medication 1 SPRAY(S): at 05:47

## 2024-10-28 RX ADMIN — Medication 1 SPRAY(S): at 12:37

## 2024-10-28 RX ADMIN — PANTOPRAZOLE SODIUM 40 MILLIGRAM(S): 40 TABLET, DELAYED RELEASE ORAL at 05:48

## 2024-10-28 NOTE — PROGRESS NOTE ADULT - ASSESSMENT
60 yo F with hx of TSP for pituitary resection by Dr. Guevara in 2019 presents for blurry vision, she has been having blurry vision for a few weeks. Pt went to [facility] for eye exam due to blurry vision. They sent her for MRI which was preformed at a [facility] which showed increased size of pituitary macroadenoma with mass effect on optic chiasm., came in for admitted under Neurosurgery for further Management, imaging done here showed  Large mass in the sella and suprasellar cistern, with chiasmatic encroachment, over the course she has been complaining of intermittent chest pain, she has this chest pain for 3 weeks, no chest tenderness, no relation with exertion, EKG with no acute changes, trop 1st set done came negative too, TTE pending, medicine consulted for Medical Management.     Blurry Vission/  Large mass in the sella and suprasellar cistern, with chiasmatic encroachment   Pituitary Macroadenoma with  mass effect on optic chiasm, s/p resection   s/p transphenoidal resection of pituitary mass with Dr. Guevara   - Elevated prolactin due to possible stalk effect due to size of adenoma, diluted prolactin 47 rules out severe prolactinemia   - S/p resection on 10/23   - On steroids per endocrine recommendations   - normal T4, Synthroid started   - Monitor for diabetes insipidus, strict I&Os/ BMP     Presyncope/Syncope with bradycardia in 40s on 10/25   - c/w tele   - Cardio and EP consults noted   - Orthostatic vital signs WNL  - TTE performed 10/19/24 unremarkable, LVEF 65 - 70%  - NST performed 10/21/24 without evidence of infarction or ischemia  - Per EP, s/p ILR today 10/28/24     Intermittent chest pain  - EKG with no acute changes   - trops negative   - TTE done and is unremarkable   - PRN pain meds   - Cardiology consult noted     Prediabetes   - Counselled on low carb diet     HLD  - her LDL is 159, would start atorvastatin 20mg daily     Leukocytosis, Due to steroids   - Trend  - monitor for fever and trend WBC     Heat intolerance possibly related to hyperprolactinemia   - TFT reviewed, normal     Hypertension  - BP was running high so started on Amlodipine per Cardio recs   - Monitor BP

## 2024-10-28 NOTE — PROGRESS NOTE ADULT - ASSESSMENT
4 59F with PMHx transphenoidal pituitary resection by Dr. Guevara in 2019 presented with c/o visual disturbances. 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     1. Pituitary macroadenoma with mass effect on optic chiasm, s/p resection  - Elevated prolactin due to possible stalk effect due to size of adenoma, diluted prolactin 47 rules out severe prolactinemia  - S/p resection on 10/23  - On IV hydrocortisone 10mg q12 hr, last dose today at 6PM  - Check AM cortisol at 4AM tomorrow, then start PO hydrocortisone 20mg in AM/10mg in PM  - Repeat FT4 1.2, levothyroxine 75mcg discontinued as per neuro surgery  - Follow up appt: 11/19 @ 11:40AM with Dr Gaxiola ([address] [facility])   59F with PMHx transphenoidal pituitary resection by Dr. Guevara in 2019 presented with c/o visual disturbances. 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     1. Pituitary macroadenoma with mass effect on optic chiasm, s/p resection  - Elevated prolactin due to possible stalk effect due to size of adenoma, diluted prolactin 47 rules out severe prolactinemia  - S/p resection on 10/23  - On IV hydrocortisone 10mg q12 hr, last dose today at 6PM  - Check AM cortisol at 4AM tomorrow, then start PO hydrocortisone 20mg in AM/10mg in PM  - Repeat FT4 1.2, levothyroxine 75mcg discontinued as per neuro surgery  - Follow up appt: 11/19 @ 11:40AM with Dr Gaxiola ([address] [facility])    2. Syncope  - Syncopal episode, EP following  - S/p ILR today    3. Prediabetes, a1c 5.7%  - Nutrition education 59F with PMHx transphenoidal pituitary resection by Dr. Guevara in 2019 presented with c/o visual disturbances. 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     1. Pituitary macroadenoma with mass effect on optic chiasm, s/p resection  - Elevated prolactin due to possible stalk effect due to size of adenoma, diluted prolactin 47 rules out severe prolactinemia  - S/p resection on 10/23  - On IV hydrocortisone 10mg q12 hr, last dose today at 6PM  - Can discharge on PO hydrocortisone 30mg in AM/20mg in PM  - Repeat FT4 1.2, can reduce levothyroxine to 50mcg daily  - Follow up appt: 11/19 @ 11:40AM with Dr Gaxiola ([address] [facility])    2. Syncope  - Syncopal episode, EP following  - S/p ILR today    3. Prediabetes, a1c 5.7%  - Nutrition education

## 2024-10-28 NOTE — PROGRESS NOTE ADULT - NS ATTEND OPT1 GEN_ALL_CORE
I independently performed the documented:
I attest my time as attending is greater than 50% of the total combined time spent on qualifying patient care activities by the PA/NP and attending.
I attest my time as attending is greater than 50% of the total combined time spent on qualifying patient care activities by the PA/NP and attending.
I independently performed the documented:
I independently performed the documented:
I attest my time as attending is greater than 50% of the total combined time spent on qualifying patient care activities by the PA/NP and attending.
I independently performed the documented:

## 2024-10-28 NOTE — PROGRESS NOTE ADULT - SUBJECTIVE AND OBJECTIVE BOX
INTERVAL HPI/OVERNIGHT EVENTS:  Follow up pituitary resection. S/p loop recorder implant. Denies acute pain, endorses good appetite.     MEDICATIONS  (STANDING):  amLODIPine   Tablet 5 milliGRAM(s) Oral daily  atorvastatin 20 milliGRAM(s) Oral at bedtime  enoxaparin Injectable 40 milliGRAM(s) SubCutaneous <User Schedule>  hydrocortisone sodium succinate Injectable 10 milliGRAM(s) IV Push every 12 hours  pantoprazole    Tablet 40 milliGRAM(s) Oral before breakfast  polyethylene glycol 3350 17 Gram(s) Oral daily  senna 2 Tablet(s) Oral at bedtime  sodium chloride 0.65% Nasal 1 Spray(s) Both Nostrils four times a day    MEDICATIONS  (PRN):  acetaminophen     Tablet .. 650 milliGRAM(s) Oral every 6 hours PRN Mild Pain (1 - 3)  hydrALAZINE Injectable 10 milliGRAM(s) IV Push every 2 hours PRN SBP > 160mm Hg  HYDROmorphone  Injectable 0.5 milliGRAM(s) IV Push every 6 hours PRN Severe Pain (7 - 10)  labetalol Injectable 10 milliGRAM(s) IV Push every 2 hours PRN Systolic blood pressure >160  ondansetron Injectable 4 milliGRAM(s) IV Push every 6 hours PRN Nausea and/or Vomiting  ondansetron Injectable 4 milliGRAM(s) IV Push once PRN Nausea and/or Vomiting  oxyCODONE    IR 10 milliGRAM(s) Oral every 4 hours PRN Severe Pain (7 - 10)  oxyCODONE    IR 5 milliGRAM(s) Oral every 4 hours PRN Moderate Pain (4 - 6)    Allergies  No Known Allergies    Vital Signs Last 24 Hrs  T(C): 36.7 (28 Oct 2024 04:35), Max: 36.9 (27 Oct 2024 15:30)  T(F): 98 (28 Oct 2024 04:35), Max: 98.4 (27 Oct 2024 15:30)  HR: 108 (28 Oct 2024 12:00) (63 - 115)  BP: 137/89 (28 Oct 2024 12:00) (116/67 - 149/96)  BP(mean): 102 (28 Oct 2024 12:00) (82 - 109)  RR: 17 (28 Oct 2024 12:00) (16 - 18)  SpO2: 99% (28 Oct 2024 12:00) (96% - 100%)    Parameters below as of 28 Oct 2024 12:00  Patient On (Oxygen Delivery Method): room air      PHYSICAL EXAM:  General: No apparent distress  Respiratory: Lungs clear bilaterally  Cardiac: +S1, S2, no m/r/g  GI: +BS, soft, non tender, non distended  Extremities: No peripheral edema  Neuro: A+O X3    LABS:                        12.6   13.09 )-----------( 207      ( 28 Oct 2024 05:49 )             37.8     10-28    140  |  104  |  14.1  ----------------------------<  87  3.8   |  24.0  |  0.67    Ca    8.4      28 Oct 2024 05:49  Phos  3.8     10-28  Mg     2.1     10-28      Urinalysis Basic - ( 28 Oct 2024 05:49 )    Color: x / Appearance: x / SG: x / pH: x  Gluc: 87 mg/dL / Ketone: x  / Bili: x / Urobili: x   Blood: x / Protein: x / Nitrite: x   Leuk Esterase: x / RBC: x / WBC x   Sq Epi: x / Non Sq Epi: x / Bacteria: x    Free Thyroxine, Serum: 1.2 ng/dL (10-25-24 @ 05:38)  Thyroid Stimulating Hormone, Serum: 0.21 uIU/mL (10-24-24 @ 04:25)  Free Thyroxine, Serum: 0.9 ng/dL (10-22-24 @ 04:52)  Thyroid Stimulating Hormone, Serum: 2.19 uIU/mL (10-19-24 @ 04:15)  Triiodothyronine, Total (T3 Total): 98 ng/dL (10-19-24 @ 04:15)   INTERVAL HPI/OVERNIGHT EVENTS:  Follow up pituitary resection. S/p loop recorder implant. Denies acute pain, endorses good appetite.     MEDICATIONS  (STANDING):  amLODIPine   Tablet 5 milliGRAM(s) Oral daily  atorvastatin 20 milliGRAM(s) Oral at bedtime  enoxaparin Injectable 40 milliGRAM(s) SubCutaneous <User Schedule>  hydrocortisone sodium succinate Injectable 10 milliGRAM(s) IV Push every 12 hours  pantoprazole    Tablet 40 milliGRAM(s) Oral before breakfast  polyethylene glycol 3350 17 Gram(s) Oral daily  senna 2 Tablet(s) Oral at bedtime  sodium chloride 0.65% Nasal 1 Spray(s) Both Nostrils four times a day    MEDICATIONS  (PRN):  acetaminophen     Tablet .. 650 milliGRAM(s) Oral every 6 hours PRN Mild Pain (1 - 3)  hydrALAZINE Injectable 10 milliGRAM(s) IV Push every 2 hours PRN SBP > 160mm Hg  HYDROmorphone  Injectable 0.5 milliGRAM(s) IV Push every 6 hours PRN Severe Pain (7 - 10)  labetalol Injectable 10 milliGRAM(s) IV Push every 2 hours PRN Systolic blood pressure >160  ondansetron Injectable 4 milliGRAM(s) IV Push every 6 hours PRN Nausea and/or Vomiting  ondansetron Injectable 4 milliGRAM(s) IV Push once PRN Nausea and/or Vomiting  oxyCODONE    IR 10 milliGRAM(s) Oral every 4 hours PRN Severe Pain (7 - 10)  oxyCODONE    IR 5 milliGRAM(s) Oral every 4 hours PRN Moderate Pain (4 - 6)    Allergies  No Known Allergies    Vital Signs Last 24 Hrs  T(C): 36.7 (28 Oct 2024 04:35), Max: 36.9 (27 Oct 2024 15:30)  T(F): 98 (28 Oct 2024 04:35), Max: 98.4 (27 Oct 2024 15:30)  HR: 108 (28 Oct 2024 12:00) (63 - 115)  BP: 137/89 (28 Oct 2024 12:00) (116/67 - 149/96)  BP(mean): 102 (28 Oct 2024 12:00) (82 - 109)  RR: 17 (28 Oct 2024 12:00) (16 - 18)  SpO2: 99% (28 Oct 2024 12:00) (96% - 100%)    Parameters below as of 28 Oct 2024 12:00  Patient On (Oxygen Delivery Method): room air      PHYSICAL EXAM:  General: No apparent distress  Respiratory: Lungs clear bilaterally  Cardiac: +S1, S2, no m/r/g  GI: +BS, soft, non tender, non distended  Extremities: No peripheral edema  Neuro: A+O X3    LABS:                        12.6   13.09 )-----------( 207      ( 28 Oct 2024 05:49 )             37.8     10-28    140  |  104  |  14.1  ----------------------------<  87  3.8   |  24.0  |  0.67    Ca    8.4      28 Oct 2024 05:49  Phos  3.8     10-28  Mg     2.1     10-28    Free Thyroxine, Serum: 1.2 ng/dL (10-25-24 @ 05:38)  Thyroid Stimulating Hormone, Serum: 0.21 uIU/mL (10-24-24 @ 04:25)  Free Thyroxine, Serum: 0.9 ng/dL (10-22-24 @ 04:52)  Thyroid Stimulating Hormone, Serum: 2.19 uIU/mL (10-19-24 @ 04:15)  Triiodothyronine, Total (T3 Total): 98 ng/dL (10-19-24 @ 04:15)

## 2024-10-28 NOTE — PROGRESS NOTE ADULT - SUBJECTIVE AND OBJECTIVE BOX
Procedure: MDT ILR implant   Implanting physician: Dr. Cantu    Pt doing well s/p MDT loop recorder implant. Denies complaint.     Incision: Dermabond C/D/I; no bleeding, hematoma, erythema, exudate or edema    Plan:   Ambulate w/ assist, then progress as tolerated.     Pain control with PO analgesia PRN.   Outpatient follow up in 2-3 weeks.     Loop Recorder Incision Care:     - Do not touch the incision until it is completely healed.   - Keep the incision dry for 24 hours.   - There is Dermabond (skin glue) and/or Steristrips (white bandages) on your incision, which will start to flake off on its own over the next 2-3 weeks. Do not pick at or peel off the Dermabond/Steristrips.   - Do not apply soaps, creams, lotions, ointments or powders to the incision until it is completely healed.  - You should call the doctor if you notice redness, drainage, swelling, increased tenderness, hot sensation around the incision, bleeding or incision edges pulling apart.

## 2024-10-28 NOTE — PROGRESS NOTE ADULT - PROVIDER SPECIALTY LIST ADULT
Cardiology
Cardiology
Endocrinology
Endocrinology
Neurosurgery
Neurosurgery
Endocrinology
Endocrinology
Hospitalist
Hospitalist
Neurosurgery
Electrophysiology
Electrophysiology
Endocrinology
Hospitalist
Neurosurgery
Hospitalist
Neurosurgery
Cardiology

## 2024-10-28 NOTE — PROGRESS NOTE ADULT - REASON FOR ADMISSION
Pituitary Mass

## 2024-10-28 NOTE — DISCHARGE NOTE NURSING/CASE MANAGEMENT/SOCIAL WORK - FINANCIAL ASSISTANCE
[facility] provides services at a reduced cost to those who are determined to be eligible through [facility]’s financial assistance program. Information regarding [facility]’s financial assistance program can be found by going to [email or web address] or by calling [phone].

## 2024-10-28 NOTE — PROGRESS NOTE ADULT - SUBJECTIVE AND OBJECTIVE BOX
[facility] Division of Hospital Medicine     Chief Complaint:  Pituitary Mass     SUBJECTIVE / OVERNIGHT EVENTS:   Pt reports mild headache     Patient denies chest pain, SOB, abd pain, N/V, fever, chills, dysuria or any other complaints. All remainder ROS negative.     MEDICATIONS  (STANDING):   amLODIPine   Tablet 5 milliGRAM(s) Oral daily   atorvastatin 20 milliGRAM(s) Oral at bedtime   enoxaparin Injectable 40 milliGRAM(s) SubCutaneous <User Schedule>   hydrocortisone sodium succinate Injectable 10 milliGRAM(s) IV Push every 12 hours   pantoprazole    Tablet 40 milliGRAM(s) Oral before breakfast   polyethylene glycol 3350 17 Gram(s) Oral daily   senna 2 Tablet(s) Oral at bedtime   sodium chloride 0.65% Nasal 1 Spray(s) Both Nostrils four times a day     MEDICATIONS  (PRN):   acetaminophen     Tablet .. 650 milliGRAM(s) Oral every 6 hours PRN Mild Pain (1 - 3)  hydrALAZINE Injectable 10 milliGRAM(s) IV Push every 2 hours PRN SBP > 160mm Hg  HYDROmorphone  Injectable 0.5 milliGRAM(s) IV Push every 6 hours PRN Severe Pain (7 - 10)  labetalol Injectable 10 milliGRAM(s) IV Push every 2 hours PRN Systolic blood pressure >160  ondansetron Injectable 4 milliGRAM(s) IV Push every 6 hours PRN Nausea and/or Vomiting  ondansetron Injectable 4 milliGRAM(s) IV Push once PRN Nausea and/or Vomiting  oxyCODONE    IR 10 milliGRAM(s) Oral every 4 hours PRN Severe Pain (7 - 10)  oxyCODONE    IR 5 milliGRAM(s) Oral every 4 hours PRN Moderate Pain (4 - 6)        I&O's Summary     27 Oct 2024 07:01  -  28 Oct 2024 07:00  --------------------------------------------------------  IN: 200 mL / OUT: 0 mL / NET: 200 mL        PHYSICAL EXAM:   Vital Signs Last 24 Hrs  T(C): 36.7 (28 Oct 2024 04:35), Max: 36.9 (27 Oct 2024 15:30)  T(F): 98 (28 Oct 2024 04:35), Max: 98.4 (27 Oct 2024 15:30)  HR: 108 (28 Oct 2024 12:00) (63 - 115)  BP: 137/89 (28 Oct 2024 12:00) (116/67 - 149/96)  BP(mean): 102 (28 Oct 2024 12:00) (82 - 109)  RR: 17 (28 Oct 2024 12:00) (16 - 18)  SpO2: 99% (28 Oct 2024 12:00) (96% - 100%)    Parameters below as of 28 Oct 2024 12:00  Patient On (Oxygen Delivery Method): room air        CONSTITUTIONAL: NAD, sitting up in bed  ENMT:  dressing in place   RESPIRATORY: Normal respiratory effort; lungs are clear to auscultation bilaterally  CARDIOVASCULAR: Regular rate and rhythm, normal S1 and S2   ABDOMEN: Nontender to palpation, normoactive bowel sounds  PSYCH: A+O to person, place, and time; affect appropriate  NEUROLOGY: No gross deficits         LABS:                        12.6   13.09 )-----------( 207      ( 28 Oct 2024 05:49 )             37.8     10-28    140  |  104  |  14.1  ----------------------------<  87  3.8   |  24.0  |  0.67    Ca    8.4      28 Oct 2024 05:49  Phos  3.8     10-28  Mg     2.1     10-28            Urinalysis Basic - ( 28 Oct 2024 05:49 )    Color: x / Appearance: x / SG: x / pH: x  Gluc: 87 mg/dL / Ketone: x  / Bili: x / Urobili: x   Blood: x / Protein: x / Nitrite: x   Leuk Esterase: x / RBC: x / WBC x   Sq Epi: x / Non Sq Epi: x / Bacteria: x

## 2024-10-28 NOTE — PROGRESS NOTE ADULT - ASSESSMENT
59F known to [facility] service, had transphenoidal resection 5 years ago, lost to follow up. Now presenting with recurrence, left visual loss progressive over several months now s/p transphenoidal resection of pituitary mass with Dr. Guevara on 10/23/24, POD#5    PLAN:      INCOMPLETE 59F known to [facility] service, had transphenoidal resection 5 years ago, lost to follow up. Now presenting with recurrence, left visual loss progressive over several months now s/p transphenoidal resection of pituitary mass with Dr. Guevara on 10/23/24, POD#5    PLAN:  - Patient is s/p ILR placement, she is appropriate for d/c  - Transsphenoidal precautions: NO nose blowing, CPAP/BiPAP, nasal cannula, nasal swabs, NGT, straws or incentive spirometry; avoid straining  - Monitor for any signs of CSF leak  - PT/OT: home care  - [place] nasal spray  - As per endo, patient d/c with levothyroxine 75mg daily and PO hydrocortisone 30mg AM/20mg PM  - Follow up endocrine appt: 11/19 @ 11:40AM with Dr Gaxiola ([facility]) 59F known to [facility] service, had transphenoidal resection 5 years ago, lost to follow up. Now presenting with recurrence, left visual loss progressive over several months now s/p transphenoidal resection of pituitary mass with Dr. Guevara on 10/23/24, POD#5    PLAN:  - Patient is s/p ILR placement, she is appropriate for d/c  - Transsphenoidal precautions: NO nose blowing, CPAP/BiPAP, nasal cannula, nasal swabs, NGT, straws or incentive spirometry; avoid straining  - Monitor for any signs of CSF leak  - PT/OT: home care  - [place] nasal spray  - As per endo, patient d/c with levothyroxine 50mcg daily and PO hydrocortisone 30mg AM/20mg PM  - Follow up endocrine appt: 11/19 @ 11:40AM with Dr Gaxiola ([facility])

## 2024-10-28 NOTE — DISCHARGE NOTE NURSING/CASE MANAGEMENT/SOCIAL WORK - PATIENT PORTAL LINK FT
You can access the FollowMyHealth Patient Portal offered by [facility] by registering at the following website: [email or web address]. By joining [facility]’s FollowMyHealth portal, you will also be able to view your health information using other applications (apps) compatible with our system.

## 2024-10-28 NOTE — PROGRESS NOTE ADULT - NS ATTEND AMEND GEN_ALL_CORE FT
pt feeling generally well today. No further bradycardia, remains in sinus rhythm with rates up to 100s bpm, no further bradycardia noted.   She did have syncope yesterday of unclear etiology, but subsequently noted to have junctional bradycardia- differential includes vasovagal trigger leading to dizziness/syncope, vs primary bradycardic event.   No significant subsequent bradyarrhythmia, and no AV block- no indication for pacemaker at this time.   However, would benefit from further monitoring to evaluate for symptomatic bradycardia in the future- long-term monitoring may be most useful if syncope recurs in future, and she is agreeable to ILR implant. Will therefore likely plan ILR implant Monday for ongoing monitoring.     She also reports intermitted chest pain, but recent stress testing was normal.   She has elevated BP- Dr Segura to reasses if she requires antihypertensive medication at this point- if so, would avoid rate slowing medications.     After ILR implant, would benefit from cardiology and EP/device followup.
In brief, 59F with PMHx transphenoidal pituitary resection by Dr. Guevara in 2019 presented with c/o visual disturbances. 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  Initial post op free T4 level was low but then repeat testing improving, lower LT4 to 50 mcg daily and possible taper as outpt.  Started on steroids post op and will continue PO hydrocortisone as outpt which can be tapered as outpt.  To follow up with Dr. Gaxiola.
will taper hydrocortisone further as status remains stable
No clinical evidence of pituitary hyperfunction; elevated prolactin likely due to stalk compression, but will check diluted prolactin level to rule out assay interference.  On dexamethasone.  Check igf-1 and free t4.   Will follow
Patient seen and examined risk stratifcation for pituitary macroadenoma w/ mass effect on optic chiasm    60 yo F with hx of TSP for pituitary resection by Dr. Guevara in 2019 presents for blurry vision. Pt has been having blurry vision for a few weeks. Pt went to [facility] for eye exam due to blurry vision. They sent her for MRI which was preformed at a [facility] which showed increased size of pituitary macroadenoma with mass effect on optic chiasm. Pt did not have Dr. Guevara's info so presented to [facility]. Pt transferred to [facility].     1. Preoperative cardiovascular assessment prior to pituitray mass removal     - patinet had TTE done which shows normal LVEF with no sigificant valvular issues and Exercise stress test . NORMAL STUDY Patient achieved 7.0 METS, which is consistent with average exercise capacity. 103% MPHR. No cardiac symptoms. No ischemic ECG changes. High treadmill score = 5 (Low risk)  - from a cardiac standpoint patinet is low risk for an intermediate to elevated risk procedure and may proceed with no need for any further cardiavascular assessment; RCRI (revised cardiac risk index score) < 1%.   - close intraprocedureal monitoring    sign off     Mare Segura D.O. [facility]  Cardiology/Vascular Cardiology -[facility] Cardiology   Telephone # [phone]
Patient was seen and examined and presented with dizziness which was deemed likely secondary to her microadenoma, pituitary   Today prior to discharge had episode of syncope which coincided with junctional rhythm and then subsequently tachycardic   She notes that she still has dizziness and feels out of it and otherwise no chest pain or shortness of breath (recent TTE and NST done shows no evidence of ischemia or infarct)   Orthostatic negative   Recommend to have EP on board to assess if any need for ILR vs outpatient MCOT vs PPM   will recommend to continue to monitor for another 24 hrs and assess any recurrence     Mare Segura D.O. [facility]  Cardiology/Vascular Cardiology -[facility]   Telephone # [phone]
RILEY Attg:    see above    patient seen and examined    agree with above
pt. highly likely to have or develop secondary hypothyroidism, reasonable to continue hormone replacement.
RILEY Attg:    see above    patient seen and examined    agree with above

## 2024-10-28 NOTE — PROGRESS NOTE ADULT - SUBJECTIVE AND OBJECTIVE BOX
HPI:  60 yo F with hx of TSP for pituitary resection by Dr. Guevara in 2019 presents for blurry vision. Pt has been having blurry vision for a few weeks. Pt went to [facility] for eye exam due to blurry vision. They sent her for MRI which was preformed at a [facility] which showed increased size of pituitary macroadenoma with mass effect on optic chiasm. Pt did not have Dr. Guevara's info so presented to [facility]. Pt transferred to [facility].   Pt denies HA, SOB, palpitations.      INTERVAL HPI/OVERNIGHT EVENTS:  59y Female s/p TSP resection of pituitary macroadenoma POD#5 seen sitting comfortably in chair after returning from ILR placement. Patient is pleasant and tolerated procedure well. Tolerating diet. Passing gas/BM. Voiding. Denies headache, weakness, numbness, n/v/d, fevers, chills, chest pain, SOB. Patient states vision has continued to improve and residual deficit is in left eye, outer peripheral vision.    Vital Signs Last 24 Hrs  T(C): 36.7 (28 Oct 2024 04:35), Max: 36.9 (27 Oct 2024 15:30)  T(F): 98 (28 Oct 2024 04:35), Max: 98.4 (27 Oct 2024 15:30)  HR: 115 (28 Oct 2024 10:10) (63 - 115)  BP: 134/80 (28 Oct 2024 10:10) (116/67 - 149/96)  BP(mean): 97 (28 Oct 2024 10:10) (82 - 126)  RR: 17 (28 Oct 2024 10:10) (15 - 18)  SpO2: 97% (28 Oct 2024 10:10) (96% - 100%)    Parameters below as of 28 Oct 2024 10:10  Patient On (Oxygen Delivery Method): room air        PHYSICAL EXAM:  GENERAL: NAD, well-groomed, well-developed  HEAD:  Atraumatic, normocephalic  WOUND: Mustache dressing clean, dry, intact  JOSE COMA SCORE:15  MENTAL STATUS: AAO x3; Awake; Opens eyes spontaneously; Appropriately conversant without aphasia; following commands  CRANIAL NERVES: Left eye outer visual field cut. PERRL. EOMI without nystagmus. face symmetric w/ normal eye closure and smile. Hearing grossly intact. Speech clear.   MOTOR: strength 5/5 b/l upper and lower extremities    LABS:                        12.6   13.09 )-----------( 207      ( 28 Oct 2024 05:49 )             37.8     10-28    140  |  104  |  14.1  ----------------------------<  87  3.8   |  24.0  |  0.67    Ca    8.4      28 Oct 2024 05:49  Phos  3.8     10-28  Mg     2.1     10-28        Urinalysis Basic - ( 28 Oct 2024 05:49 )    Color: x / Appearance: x / SG: x / pH: x  Gluc: 87 mg/dL / Ketone: x  / Bili: x / Urobili: x   Blood: x / Protein: x / Nitrite: x   Leuk Esterase: x / RBC: x / WBC x   Sq Epi: x / Non Sq Epi: x / Bacteria: x        10-27 @ 07:01  -  10-28 @ 07:00  --------------------------------------------------------  IN: 200 mL / OUT: 0 mL / NET: 200 mL        RADIOLOGY & ADDITIONAL TESTS:    ACC: [id number] EXAM:  MR BRAIN WAW IC   ORDERED BY: RUSS ROBB     PROCEDURE DATE:  10/24/2024          INTERPRETATION:  CLINICAL INDICATIONS: S/p TSP for pituitary resection    COMPARISON: MRI brain dated 10/18/2024    TECHNIQUE: MRI brain/sella: Multiplanar, multisequence MR imaging of the   brain are obtained with and without the administration of 8 cc   intravenous Gadavist contrast. 2 cc of contrast was discarded    FINDINGS:  Status post transsphenoidal resection/debulking of a pituitary   macroadenoma. Postsurgical changes in the sellar region. 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 In the right aspect of the sella residual neoplasm   measures 12 x 6 x 6 mm. In the anterior suprasellar region residual   neoplasm measures 13 x 14 x 9 mm.    There is no abnormal restricted diffusion to suggest acute infarction.   Scattered periventricular and subcortical white matter T2 /FLAIR   hyperintensities are seen without mass effect, nonspecific, likely   representing minimal chronic microvascular changes. Normal T2 flow-voids   are seen within  the intracranial vasculature. The lateral ventricles and   cortical sulci are age-appropriate in size and configuration. There is no   mass, mass effect, or extra-axial fluid collection. There is no   susceptibility artifact to suggest hemorrhage. Midline structures are   normal.    Moderate inflammatorymucosal changes are seen throughout the various   portions of the paranasal sinuses. The orbits and mastoid air cells are   unremarkable.    IMPRESSION: Status post transsphenoidal resection/debulking of a   pituitary macroadenoma. Postsurgical changes in the sellar region.   Residual neoplasm in the right aspect of the sella extending into the   anterior suprasellar region. In the right aspect of the sella residual   neoplasm measures 12 x 6 x 6 mm. In the anterior suprasellar region   residual neoplasm measures 13 x 14 x 9 mm.    --- End of Report ---    SANTOSH DAMON MD; Attending Radiologist  This document has been electronically signed. Oct 24 2024  2:06PM

## 2024-11-04 ENCOUNTER — APPOINTMENT (OUTPATIENT)
Dept: ELECTROPHYSIOLOGY | Facility: CLINIC | Age: 59
End: 2024-11-04
Payer: COMMERCIAL

## 2024-11-04 VITALS
OXYGEN SATURATION: 95 % | BODY MASS INDEX: 30.91 KG/M2 | WEIGHT: 168 LBS | HEIGHT: 62 IN | DIASTOLIC BLOOD PRESSURE: 81 MMHG | HEART RATE: 105 BPM | SYSTOLIC BLOOD PRESSURE: 128 MMHG

## 2024-11-04 DIAGNOSIS — R55 SYNCOPE AND COLLAPSE: ICD-10-CM

## 2024-11-04 DIAGNOSIS — R00.1 BRADYCARDIA, UNSPECIFIED: ICD-10-CM

## 2024-11-04 DIAGNOSIS — D49.7 NEOPLASM OF UNSPECIFIED BEHAVIOR OF ENDOCRINE GLANDS AND OTHER PARTS OF NERVOUS SYSTEM: ICD-10-CM

## 2024-11-04 DIAGNOSIS — I10 ESSENTIAL (PRIMARY) HYPERTENSION: ICD-10-CM

## 2024-11-04 DIAGNOSIS — Z45.09 ENCOUNTER FOR ADJUSTMENT AND MANAGEMENT OF OTHER CARDIAC DEVICE: ICD-10-CM

## 2024-11-04 LAB — SURGICAL PATHOLOGY STUDY: SIGNIFICANT CHANGE UP

## 2024-11-04 PROCEDURE — 99212 OFFICE O/P EST SF 10 MIN: CPT | Mod: 25

## 2024-11-04 PROCEDURE — 93285 PRGRMG DEV EVAL SCRMS IP: CPT

## 2024-11-04 PROCEDURE — 93000 ELECTROCARDIOGRAM COMPLETE: CPT | Mod: 59

## 2024-11-04 RX ORDER — AMLODIPINE BESYLATE 5 MG/1
5 TABLET ORAL DAILY
Refills: 0 | Status: ACTIVE | COMMUNITY

## 2024-11-04 RX ORDER — HYDROCORTISONE 20 MG/1
20 TABLET ORAL
Qty: 60 | Refills: 0 | Status: ACTIVE | COMMUNITY
Start: 2024-10-25

## 2024-11-04 RX ORDER — LEVOTHYROXINE SODIUM 0.05 MG/1
50 TABLET ORAL
Qty: 30 | Refills: 0 | Status: ACTIVE | COMMUNITY
Start: 2024-10-28

## 2024-11-04 RX ORDER — HYDROCORTISONE 10 MG/1
10 TABLET ORAL
Qty: 30 | Refills: 0 | Status: ACTIVE | COMMUNITY
Start: 2024-10-25

## 2024-11-04 RX ORDER — ATORVASTATIN CALCIUM 20 MG/1
20 TABLET, FILM COATED ORAL
Refills: 0 | Status: ACTIVE | COMMUNITY

## 2024-11-06 ENCOUNTER — OFFICE (OUTPATIENT)
Dept: URBAN - METROPOLITAN AREA CLINIC 115 | Facility: CLINIC | Age: 59
Setting detail: OPHTHALMOLOGY
End: 2024-11-06
Payer: COMMERCIAL

## 2024-11-06 DIAGNOSIS — H25.13: ICD-10-CM

## 2024-11-06 DIAGNOSIS — D35.2: ICD-10-CM

## 2024-11-06 DIAGNOSIS — H53.433: ICD-10-CM

## 2024-11-06 DIAGNOSIS — H46.2: ICD-10-CM

## 2024-11-06 PROCEDURE — 92250 FUNDUS PHOTOGRAPHY W/I&R: CPT | Performed by: OPHTHALMOLOGY

## 2024-11-06 PROCEDURE — 92014 COMPRE OPH EXAM EST PT 1/>: CPT | Performed by: OPHTHALMOLOGY

## 2024-11-06 ASSESSMENT — REFRACTION_CURRENTRX
OD_OVR_VA: 20/
OD_VPRISM_DIRECTION: SV
OS_AXIS: 73
OS_SPHERE: +2.50
OS_VPRISM_DIRECTION: SV
OS_CYLINDER: -0.25
OD_CYLINDER: -0.25
OD_SPHERE: -+2.50
OD_AXIS: 85
OS_OVR_VA: 20/

## 2024-11-06 ASSESSMENT — REFRACTION_AUTOREFRACTION
OD_CYLINDER: -1.00
OS_AXIS: 084
OS_CYLINDER: -1.50
OS_SPHERE: +2.50
OD_SPHERE: +1.75
OD_AXIS: 075

## 2024-11-06 ASSESSMENT — TONOMETRY
OD_IOP_MMHG: 14
OS_IOP_MMHG: 12

## 2024-11-06 ASSESSMENT — VISUAL ACUITY
OS_BCVA: 20/20
OD_BCVA: 20/80

## 2024-11-06 ASSESSMENT — CONFRONTATIONAL VISUAL FIELD TEST (CVF)
OS_FINDINGS: FULL
OD_FINDINGS: FULL

## 2024-11-12 ENCOUNTER — NON-APPOINTMENT (OUTPATIENT)
Age: 59
End: 2024-11-12

## 2024-11-12 PROBLEM — R55 SYNCOPE, UNSPECIFIED SYNCOPE TYPE: Status: ACTIVE | Noted: 2024-11-12

## 2024-11-12 PROBLEM — Z45.09 ENCOUNTER FOR LOOP RECORDER CHECK: Status: ACTIVE | Noted: 2024-11-12

## 2024-11-12 PROBLEM — D49.7 PITUITARY TUMOR: Status: ACTIVE | Noted: 2024-11-12

## 2024-11-12 PROBLEM — R00.1 BRADYCARDIA: Status: ACTIVE | Noted: 2024-11-12

## 2024-11-12 PROBLEM — I10 HYPERTENSION, UNSPECIFIED TYPE: Status: ACTIVE | Noted: 2024-11-12

## 2024-11-19 ENCOUNTER — APPOINTMENT (OUTPATIENT)
Dept: ENDOCRINOLOGY | Facility: CLINIC | Age: 59
End: 2024-11-19
Payer: COMMERCIAL

## 2024-11-19 VITALS
BODY MASS INDEX: 30.73 KG/M2 | OXYGEN SATURATION: 99 % | SYSTOLIC BLOOD PRESSURE: 118 MMHG | WEIGHT: 167 LBS | HEIGHT: 62 IN | DIASTOLIC BLOOD PRESSURE: 78 MMHG | HEART RATE: 78 BPM

## 2024-11-19 DIAGNOSIS — D49.7 NEOPLASM OF UNSPECIFIED BEHAVIOR OF ENDOCRINE GLANDS AND OTHER PARTS OF NERVOUS SYSTEM: ICD-10-CM

## 2024-11-19 PROCEDURE — 99214 OFFICE O/P EST MOD 30 MIN: CPT

## 2024-11-19 PROCEDURE — G2211 COMPLEX E/M VISIT ADD ON: CPT

## 2024-11-20 ENCOUNTER — LABORATORY RESULT (OUTPATIENT)
Age: 59
End: 2024-11-20

## 2024-11-20 NOTE — CHART NOTE - NSCHARTNOTEFT_GEN_A_CORE
Agree with surgical pathology report of pituitary adenoma. Agree with surgical pathology report of pituitary adenoma.    The patient had a pituitary adenoma resulting in brain compression on the optic chiasm.

## 2024-11-21 LAB
ACTH SER-ACNC: 16.2 PG/ML
ALBUMIN SERPL ELPH-MCNC: 4.3 G/DL
ALP BLD-CCNC: 102 U/L
ALT SERPL-CCNC: 36 U/L
ANION GAP SERPL CALC-SCNC: 14 MMOL/L
AST SERPL-CCNC: 23 U/L
BILIRUB SERPL-MCNC: 0.2 MG/DL
BUN SERPL-MCNC: 16 MG/DL
CALCIUM SERPL-MCNC: 10 MG/DL
CHLORIDE SERPL-SCNC: 103 MMOL/L
CO2 SERPL-SCNC: 25 MMOL/L
CORTIS SERPL-MCNC: 11.5 UG/DL
CREAT SERPL-MCNC: 0.83 MG/DL
EGFR: 80 ML/MIN/1.73M2
FSH SERPL-MCNC: 11.8 IU/L
GLUCOSE SERPL-MCNC: 95 MG/DL
HCT VFR BLD CALC: 40.6 %
HGB BLD-MCNC: 13 G/DL
LH SERPL-ACNC: 9.7 IU/L
MCHC RBC-ENTMCNC: 30.2 PG
MCHC RBC-ENTMCNC: 32 G/DL
MCV RBC AUTO: 94.2 FL
PLATELET # BLD AUTO: 357 K/UL
POTASSIUM SERPL-SCNC: 5 MMOL/L
PROLACTIN SERPL-MCNC: 9.9 NG/ML
PROT SERPL-MCNC: 6.9 G/DL
RBC # BLD: 4.31 M/UL
RBC # FLD: 14.1 %
SODIUM SERPL-SCNC: 142 MMOL/L
T3RU NFR SERPL: 1 TBI
T4 SERPL-MCNC: 8.4 UG/DL
TSH SERPL-ACNC: 0.91 UIU/ML
WBC # FLD AUTO: 10.75 K/UL

## 2024-11-21 NOTE — CDI QUERY NOTE - NSCDIOTHERTXTBX_GEN_ALL_CORE_HH
Clinical documentation and/or evidence of the patient’s presentation, evaluation, and medical management, as evidenced below, may support a diagnosis that is not documented in the medical record.  In order to ensure accurate coding and accuracy of the clinical record, the documentation in this patient’s medical record requires additional clarification.      If you think the supporting documentation and/or clinical evidence supports a more specific diagnosis, please include more specific documentation of a diagnosis associated with these findings in your progress note and/or discharge summary.    Please clarify if documented mass effect has a clinically significant diagnosis.  -	Cerebral compression  -	Cerebral edema  -	Cerebral herniation  -	Other, please specify.      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 Never required steroids  Started now on Decadron preoperatively

## 2024-12-05 ENCOUNTER — NON-APPOINTMENT (OUTPATIENT)
Age: 59
End: 2024-12-05

## 2024-12-05 ENCOUNTER — APPOINTMENT (OUTPATIENT)
Dept: ELECTROPHYSIOLOGY | Facility: CLINIC | Age: 59
End: 2024-12-05
Payer: COMMERCIAL

## 2024-12-05 PROCEDURE — 93298 REM INTERROG DEV EVAL SCRMS: CPT

## 2024-12-17 NOTE — PROGRESS NOTE ADULT - ASSESSMENT
Unna Boot Application   Below Knee    NAME:  Damien Aguilar  YOB: 1960  MEDICAL RECORD NUMBER:  [id number]  DATE:  12/17/2024    Unna boot: Appied primary and secondary dressing as ordered.  Applied Unna roll from toes to knee overlapping each time.   Applied ace wrap or coban from toes to below the knee.   Secured with tape and/or metal clips covered with tape.   Instructed patient/caregiver to keep dressing dry and intact. DO NOT REMOVE DRESSING.   Instructed pt/family/caregiver to report excessive draining, loose bandage, wet dressing, severe pain or tingling in toes.  Applied Unna Boot dressing below the knee to right lower leg.    Unna Boot(s) were applied per  Guidelines.     Electronically signed by Judy Bobby RN on 12/17/2024 at 3:29 PM      60 yo F with hx of TSP for pituitary resection by Dr. Guevara in 2019 presents for blurry vision, she has been having blurry vision for a few weeks. Pt went to sight [facility] for eye exam due to blurry vision. They sent her for MRI which was preformed at a [facility] which showed increased size of pituitary macroadenoma with mass effect on optic chiasm., came in for admitted under Neurosurgery for further Management, imaging done here showed  Large mass in the sella and suprasellar cistern, with chiasmatic encroachment, over the course she has been complaining of intermittent chest pain, she has this chest pain for 3 weeks, no chest tenderness, no relation with exertion, EKG with no acute changes, trop 1st set done came negative too, TTE pending, medicine consulted for Medical Management.     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 Guevara   - Elevated prolactin due to possible stalk effect due to size of adenoma, diluted prolactin 47 rules out severe prolactinemia  - S/p resection on 10/23  - On steroids per endocrine recommendations   - normal T4, Synthroid started  - Monitor for diabetes insipidus, strict I&Os/ BMP      Presyncope/Syncope with bradycardia in 40s on 10/25   - c/w tele  - Cardio and EP consults noted   - Orthostatic vital signs WNL  - TTE performed 10/19/24 unremarkable, LVEF 65 - 70%  - NST performed 10/21/24 without evidence of infarction or ischemia  - EP planning for ILR Monday     Intermittent chest pain  - EKG with no acute changes  - trops negative  - TTE done and is unremarkable  - PRN pain meds   - Cardiology consult noted     Prediabetes  - Counselled on low carb diet     HLD  - her LDL is 159, would start atorvastatin 20mg daily     Leukocytosis, Due to steroids  - Trend  - monitor for fever and trend WBC    Heat intolerance possibly related to hyperprolactinemia.  - TFT reviewed, normal.    Hypertension  - controlled    raise/elevate legs above the level of the heart for 15-20 minutes if swelling does not go down then carefully cut off unna boot and call clinic or go to local ER or family physician. If unna boot becomes wet or starts to roll down then carefully remove unna boot and call clinic.       Keep all dressings clean & dry.    Follow up visit: 2 weeks - Tuesday December 31st at 2:45 pm     Supplies:     Keep next scheduled appointment. Please give 24 hour notice if unable to keep appointment. [phone]    If you experience any of the following, please call the Wound Care Service during business hours: Monday through Friday 8:00 am - 4:30 pm  ([phone]).   *Increase in pain   *Temperature over 101   *Increase in drainage from your wound or a foul odor   *Uncontrolled swelling   *Need for compression bandage changes due to slippage, breakthrough drainage    If you need medical attention outside of business hours, please contact your Primary Care Doctor or go to the nearest emergency room.      Electronically signed by Ja Rivas DPM, FACTRELL on 12/17/2024 at 3:21 PM

## 2024-12-18 NOTE — ED ADULT TRIAGE NOTE - INTERNATIONAL TRAVEL
[de-identified] : I went over the pathophysiology of the patient's symptoms in great detail with the patient. I discussed the underlying pathophysiology of the patient's condition in great detail with the patient. I went over the patient's x-rays with them in great detail. I am recommending the patient continues to go to physical therapy to obtain increases in their strength and mobility. A prescription was provided. We discussed the use of ice, Tylenol and anti-inflammatories to relieve pain. At-home strengthening exercises were discussed and demonstrated with the patient.   All of their questions were answered. They understand and consent to the plan.  FU in one month. If she does not improve, we will consider an MRI of her left knee upon return.  No

## 2025-01-09 ENCOUNTER — APPOINTMENT (OUTPATIENT)
Dept: ELECTROPHYSIOLOGY | Facility: CLINIC | Age: 60
End: 2025-01-09
Payer: COMMERCIAL

## 2025-01-09 ENCOUNTER — NON-APPOINTMENT (OUTPATIENT)
Age: 60
End: 2025-01-09

## 2025-01-09 PROCEDURE — 93298 REM INTERROG DEV EVAL SCRMS: CPT

## 2025-01-15 ENCOUNTER — OFFICE (OUTPATIENT)
Dept: URBAN - METROPOLITAN AREA CLINIC 115 | Facility: CLINIC | Age: 60
Setting detail: OPHTHALMOLOGY
End: 2025-01-15
Payer: COMMERCIAL

## 2025-01-15 DIAGNOSIS — H46.2: ICD-10-CM

## 2025-01-15 DIAGNOSIS — H25.13: ICD-10-CM

## 2025-01-15 DIAGNOSIS — H53.433: ICD-10-CM

## 2025-01-15 DIAGNOSIS — D35.2: ICD-10-CM

## 2025-01-15 PROCEDURE — 92083 EXTENDED VISUAL FIELD XM: CPT | Performed by: OPHTHALMOLOGY

## 2025-01-15 PROCEDURE — 92012 INTRM OPH EXAM EST PATIENT: CPT | Performed by: OPHTHALMOLOGY

## 2025-01-15 ASSESSMENT — REFRACTION_CURRENTRX
OS_OVR_VA: 20/
OD_ADD: +2.25
OS_ADD: +2.25
OD_SPHERE: +1.75
OD_VPRISM_DIRECTION: SV
OS_CYLINDER: -1.25
OD_AXIS: 082
OS_SPHERE: +1.50
OS_SPHERE: +2.50
OD_AXIS: 85
OS_VPRISM_DIRECTION: SV
OD_SPHERE: -+2.50
OS_CYLINDER: -0.25
OD_CYLINDER: -0.75
OD_OVR_VA: 20/
OS_OVR_VA: 20/
OD_VPRISM_DIRECTION: BF
OD_CYLINDER: -0.25
OS_AXIS: 73
OS_AXIS: 076
OS_VPRISM_DIRECTION: BF
OD_OVR_VA: 20/

## 2025-01-15 ASSESSMENT — REFRACTION_AUTOREFRACTION
OS_AXIS: 094
OD_SPHERE: +2.00
OD_AXIS: 081
OS_SPHERE: +2.50
OS_CYLINDER: -1.00
OD_CYLINDER: -0.50

## 2025-01-15 ASSESSMENT — CONFRONTATIONAL VISUAL FIELD TEST (CVF)
OD_FINDINGS: FULL
OS_FINDINGS: FULL

## 2025-01-15 ASSESSMENT — VISUAL ACUITY
OD_BCVA: 20/80
OS_BCVA: 20/20-1

## 2025-01-15 ASSESSMENT — TONOMETRY
OS_IOP_MMHG: 18
OD_IOP_MMHG: 20

## 2025-01-31 NOTE — PROGRESS NOTE ADULT - ASSESSMENT
59F known to NSG service, had transphenoidal resection 5 years ago, lost to follow up. Now presenting with recurrence, left visual loss progressive over several months now s/p transphenoidal resection of pituitary mass with Dr. Guevara on 10/23/24, POD#0.    Plan:  - Q1h neuro checks  - Normotension SBP   - MRI sella w w/o within 48h  - Pain control PRN, avoid oversedation  - Transphenoidal precautions: NO nose blowing, CPAP/BiPAP, nasal cannula, nasal swabs, NGT, straws or incentive spirometry; avoid straining  - Remove nasal packing in 48h after MRI completed   - Monitor for any signs of CSF leak  - HOB at least 45 degrees  - Ancef x 24hr  - DI watch: BMP q6h, UA q6, strict I&Os; mcgee in  - IVF until good PO intake  - Drink to thirst  - Endocrine labs in AM-TSH, FSH, LH, ACTH, prolactin, estrogen, GH, cortisol; Needs endocrine consult in AM  - Solucortef taper  - GI prophylaxis as on steroids  - Bowel regimen: senna, miralax  - PT/OT eval in the AM  - DVT ppx: SCDs only, ok for Lovenox POD#1  - Further care per NSICU  - Discussed with Dr. Guevara 2

## 2025-02-13 ENCOUNTER — APPOINTMENT (OUTPATIENT)
Dept: ELECTROPHYSIOLOGY | Facility: CLINIC | Age: 60
End: 2025-02-13

## 2025-02-13 PROCEDURE — 93298 REM INTERROG DEV EVAL SCRMS: CPT

## 2025-03-06 ENCOUNTER — APPOINTMENT (OUTPATIENT)
Dept: ENDOCRINOLOGY | Facility: CLINIC | Age: 60
End: 2025-03-06

## 2025-03-20 ENCOUNTER — APPOINTMENT (OUTPATIENT)
Dept: ELECTROPHYSIOLOGY | Facility: CLINIC | Age: 60
End: 2025-03-20
Payer: COMMERCIAL

## 2025-03-20 ENCOUNTER — NON-APPOINTMENT (OUTPATIENT)
Age: 60
End: 2025-03-20

## 2025-03-20 PROCEDURE — 93298 REM INTERROG DEV EVAL SCRMS: CPT

## 2025-03-25 ENCOUNTER — APPOINTMENT (OUTPATIENT)
Dept: ENDOCRINOLOGY | Facility: CLINIC | Age: 60
End: 2025-03-25
Payer: COMMERCIAL

## 2025-03-25 VITALS
OXYGEN SATURATION: 98 % | HEIGHT: 62 IN | BODY MASS INDEX: 30.18 KG/M2 | WEIGHT: 164 LBS | DIASTOLIC BLOOD PRESSURE: 70 MMHG | HEART RATE: 98 BPM | SYSTOLIC BLOOD PRESSURE: 118 MMHG

## 2025-03-25 DIAGNOSIS — Z78.0 ASYMPTOMATIC MENOPAUSAL STATE: ICD-10-CM

## 2025-03-25 DIAGNOSIS — E27.40 UNSPECIFIED ADRENOCORTICAL INSUFFICIENCY: ICD-10-CM

## 2025-03-25 DIAGNOSIS — E66.811 OBESITY, CLASS 1: ICD-10-CM

## 2025-03-25 DIAGNOSIS — E03.8 OTHER SPECIFIED HYPOTHYROIDISM: ICD-10-CM

## 2025-03-25 DIAGNOSIS — E23.0 HYPOPITUITARISM: ICD-10-CM

## 2025-03-25 DIAGNOSIS — D49.7 NEOPLASM OF UNSPECIFIED BEHAVIOR OF ENDOCRINE GLANDS AND OTHER PARTS OF NERVOUS SYSTEM: ICD-10-CM

## 2025-03-25 PROCEDURE — 99214 OFFICE O/P EST MOD 30 MIN: CPT

## 2025-03-25 PROCEDURE — G0447 BEHAVIOR COUNSEL OBESITY 15M: CPT | Mod: 59

## 2025-04-24 ENCOUNTER — APPOINTMENT (OUTPATIENT)
Dept: ELECTROPHYSIOLOGY | Facility: CLINIC | Age: 60
End: 2025-04-24
Payer: COMMERCIAL

## 2025-04-24 ENCOUNTER — NON-APPOINTMENT (OUTPATIENT)
Age: 60
End: 2025-04-24

## 2025-04-24 PROCEDURE — 93298 REM INTERROG DEV EVAL SCRMS: CPT

## 2025-05-15 ENCOUNTER — APPOINTMENT (OUTPATIENT)
Dept: ELECTROPHYSIOLOGY | Facility: CLINIC | Age: 60
End: 2025-05-15

## 2025-05-29 ENCOUNTER — NON-APPOINTMENT (OUTPATIENT)
Age: 60
End: 2025-05-29

## 2025-05-29 ENCOUNTER — APPOINTMENT (OUTPATIENT)
Dept: ELECTROPHYSIOLOGY | Facility: CLINIC | Age: 60
End: 2025-05-29
Payer: COMMERCIAL

## 2025-05-29 PROCEDURE — 93298 REM INTERROG DEV EVAL SCRMS: CPT

## 2025-06-03 NOTE — PROGRESS NOTE ADULT - ASSESSMENT
Assessment: 60 yo F with hx of TSP for pituitary resection by Dr. Guevara in 2019 presents for blurry vision. Pt has been having blurry vision for a few weeks, found to have increase in size of pituitary macroadenoma with mass effect on optic chiasm on MRI and was transferred from [facility] to [facility].     Plan:  - Q2 neuro checks, vital signs q2 hours  - Decadron 4 mg q6  - ISS  - Endo consult submitted  - Medicine consult pending  - Normotensive  - Incentive Spirometry  - DVT ppx: SCDs, Lovenox  - Pain control PRN  - GI ppx: Protonix   - Bowel regimen: Senna, PEG  -  Assessment: 58 yo F with hx of TSP for pituitary resection by Dr. Guevara in 2019 presents for blurry vision. Pt has been having blurry vision for a few weeks, found to have increase in size of pituitary macroadenoma with mass effect on optic chiasm on MRI and was transferred from [facility] to [facility].     Plan:  - Q2 neuro checks, vital signs q2 hours  - Decadron 4 mg q6  - ISS  - Endo consult submitted  - Medicine consult pending  - Normotensive  - Incentive Spirometry  - DVT ppx: SCDs, Lovenox  - Pain control PRN  - GI ppx: Protonix   - Bowel regimen: Senna, PEG  - Plan discussed with Dr. Guevara Assessment: 60 yo F with hx of TSP for pituitary resection by Dr. Guevara in 2019 presents for blurry vision. Pt has been having blurry vision for a few weeks, found to have increase in size of pituitary macroadenoma with mass effect on optic chiasm on MRI and was transferred from [facility] to [facility].     Plan:  - Q2 neuro checks, vital signs q2 hours  - Decadron 4 mg q6  - ISS  - Endo consult pending  - Medicine following  - Cardiac clearance  - Normotensive  - Incentive Spirometry  - DVT ppx: SCDs, Lovenox  - Pain control PRN  - GI ppx: Protonix   - Bowel regimen: Senna, PEG  - Plan discussed with Dr. Guevara Assessment: 60 yo F with hx of TSP for pituitary resection by Dr. Guevara in 2019 presents for blurry vision. Pt has been having blurry vision for a few weeks, found to have increase in size of pituitary macroadenoma with mass effect on optic chiasm on MRI and was transferred from [facility] to [facility].     Plan:  - Q2 neuro checks, vital signs q2 hours  - Decadron 4 mg q6  - Regular diet, ISS  - Endo consult pending  - Medicine following  - Cardiac clearance  - Normotensive  - Incentive Spirometry  - DVT ppx: SCDs, Lovenox  - Pain control PRN, avoid oversedation  - GI ppx: Protonix   - Bowel regimen: Senna, PEG  - Plan discussed with Dr. Guevara Assessment: 58 yo F with hx of TSP for pituitary resection by Dr. Guevara in 2019 presents for blurry vision. Pt has been having blurry vision for a few weeks, found to have increase in size of pituitary macroadenoma with mass effect on optic chiasm on MRI and was transferred from [facility] to [facility].     Plan:  - Q2 neuro checks, vital signs q2 hours  - Decadron 4 mg q6  - Regular diet, ISS  - Endo consult pending  - Medicine following  - Cardiac clearance  - Abnormal lipid panel, neg trop, monitor prolactin  - Normotensive  - Incentive Spirometry  - DVT ppx: SCDs, Lovenox  - Pain control PRN, avoid oversedation  - GI ppx: Protonix   - Bowel regimen: Senna, PEG  - Plan discussed with Dr. Guevara Assessment: 58 yo F with hx of TSP for pituitary resection by Dr. Guevara in 2019 presents for blurry vision. Pt has been having blurry vision for a few weeks, found to have increase in size of pituitary macroadenoma with mass effect on optic chiasm on MRI and was transferred from [facility] to [facility]. Patient is hemodynamically stable and exam unchanged at this time.     Plan:  - Q2 neuro checks, vital signs q2 hours  - Decadron 4 mg q6  - Regular diet, ISS  - Endo consult pending  - Medicine following, abnormal lipid panel, neg trop, neg LLE doppler, monitor prolactin  - Cardiac clearance  - Normotensive  - Incentive Spirometry  - DVT ppx: SCDs, Lovenox  - Pain control PRN, avoid oversedation  - GI ppx: Protonix   - Bowel regimen: Senna, PEG  - Plan discussed with Dr. Guevara Assessment: 58 yo F with hx of TSP for pituitary resection by Dr. Guevara in 2019 presents for blurry vision. Pt has been having blurry vision for a few weeks, found to have increase in size of pituitary macroadenoma with mass effect on optic chiasm on MRI and was transferred from [facility] to [facility]. Patient is hemodynamically stable and exam unchanged at this time.     Plan:  - Q2 neuro checks, vital signs q2 hours  - Decadron 4 mg q6  - Regular diet, ISS  - Endo consult pending  - Medicine following, abnormal lipid panel, neg trop, neg LLE doppler, monitor prolactin  - Cardiac clearance  - Normotensive  - Incentive Spirometry  - DVT ppx: SCDs, Lovenox  - Pain control PRN, avoid oversedation  - GI ppx: Protonix   - Bowel regimen: Senna, PEG  - Planning for OR next week  - Plan discussed with Dr. Guevara Assessment: 60 yo F with hx of TSP for pituitary resection by Dr. Guevara in 2019 presents for blurry vision. Pt has been having blurry vision for a few weeks, found to have increase in size of pituitary macroadenoma with mass effect on optic chiasm on MRI and was transferred from [facility] to [facility]. Patient is hemodynamically stable and exam unchanged at this time with surgical planning for next week.    Plan:  - Q2 neuro checks, vital signs q2 hours  - Decadron 4 mg q6  - Regular diet, ISS  - Endo consult requested and pending  - Cardiology consulted for clearance for future procedure  - Medicine following  - Normotensive  - Incentive Spirometry  - DVT ppx: SCDs, Lovenox  - Pain control PRN, avoid oversedation  - GI ppx: Protonix   - Bowel regimen: Senna, PEG  - Pre-operative planning for OR next week  - Plan discussed with Dr. Guevara 60 yo F with hx of TSP for pituitary resection by Dr. Guevara in 2019 presents for blurry vision. Pt has been having blurry vision for a few weeks, found to have increase in size of pituitary macroadenoma with mass effect on optic chiasm on MRI and was transferred from [facility] to [facility]. Patient is hemodynamically stable and exam unchanged at this time with surgical planning for next week.    Plan:  - Q2 neuro checks, vital signs q2 hours  - Decadron 4 mg q6  - Regular diet, ISS  - Endo consult requested and pending  - Cardiology consulted for clearance for future procedure  - Medicine following  - Normotensive  - Incentive Spirometry  - DVT ppx: SCDs, Lovenox  - Pain control PRN, avoid oversedation  - GI ppx: Protonix   - Bowel regimen: Senna, PEG  - Pre-operative planning for OR next week  - Plan discussed with Dr. Guevara 60 yo F with hx of TSP for pituitary resection by Dr. Guevara in 2019 presents for blurry vision. Pt has been having blurry vision for a few weeks, found to have increase in size of pituitary macroadenoma with mass effect on optic chiasm on MRI and was transferred from [facility] to [facility]. Patient is hemodynamically stable and exam unchanged at this time with surgical planning for next week.    Plan:  - Q2 neuro checks, vital signs q2 hours  - Decadron 4 mg q6  - Regular diet, ISS  - Endo consult requested and pending  - Cardiology consulted for clearance as per medicine team to r/o if patient needs stress test prior to OR, had complaints of intermittent chest pain with negative w/u so far  - Medicine following  - Normotensive  - Incentive Spirometry  - DVT ppx: SCDs, Lovenox  - LED negative   - Pain control PRN, avoid oversedation  - GI ppx: Protonix   - Bowel regimen: Senna, PEG  - Pre-operative planning for OR next week  - Once OR time determined will need to be pre-opped   - Plan discussed with Dr. Guevara 58 yo F with hx of TSP for pituitary resection by Dr. Guevara in 2019 presents for blurry vision. Pt has been having blurry vision for a few weeks, found to have increase in size of pituitary macroadenoma with mass effect on optic chiasm on MRI and was transferred from [facility] to [facility]. Patient is hemodynamically stable and exam unchanged at this time with surgical planning for next week.    Plan:  - Q2 neuro checks, vital signs q2 hours  - Decadron 4 mg q6  - Regular diet, ISS  - Endo consult requested and pending  - Cardiology consulted for clearance as per medicine team for cardiac clearance prior to OR, had complaints of intermittent chest pain with negative w/u so far  - Medicine following  - Normotensive  - Incentive spirometry  - DVT ppx: SCDs, Lovenox  - LED negative   - Pain control PRN, avoid oversedation  - Protonix ans ISS while on steroids  - Bowel regimen: senna, miralax  - Pre-operative planning for OR next week  - Once OR time determined will need to be pre-opped   - Plan discussed with Dr. Guevara Fair

## 2025-07-03 ENCOUNTER — APPOINTMENT (OUTPATIENT)
Dept: ELECTROPHYSIOLOGY | Facility: CLINIC | Age: 60
End: 2025-07-03
Payer: COMMERCIAL

## 2025-07-03 ENCOUNTER — NON-APPOINTMENT (OUTPATIENT)
Age: 60
End: 2025-07-03

## 2025-07-03 PROCEDURE — 93298 REM INTERROG DEV EVAL SCRMS: CPT

## 2025-08-06 ENCOUNTER — OFFICE (OUTPATIENT)
Dept: URBAN - METROPOLITAN AREA CLINIC 115 | Facility: CLINIC | Age: 60
Setting detail: OPHTHALMOLOGY
End: 2025-08-06
Payer: COMMERCIAL

## 2025-08-06 DIAGNOSIS — H46.2: ICD-10-CM

## 2025-08-06 DIAGNOSIS — H53.433: ICD-10-CM

## 2025-08-06 DIAGNOSIS — H25.13: ICD-10-CM

## 2025-08-06 DIAGNOSIS — D35.2: ICD-10-CM

## 2025-08-06 PROCEDURE — 92014 COMPRE OPH EXAM EST PT 1/>: CPT | Performed by: OPHTHALMOLOGY

## 2025-08-06 PROCEDURE — 92083 EXTENDED VISUAL FIELD XM: CPT | Performed by: OPHTHALMOLOGY

## 2025-08-06 PROCEDURE — 92133 CPTRZD OPH DX IMG PST SGM ON: CPT | Performed by: OPHTHALMOLOGY

## 2025-08-06 ASSESSMENT — REFRACTION_AUTOREFRACTION
OS_SPHERE: +2.50
OD_AXIS: 092
OD_SPHERE: +2.00
OS_AXIS: 089
OD_CYLINDER: --0.75
OS_CYLINDER: -1.25

## 2025-08-06 ASSESSMENT — REFRACTION_CURRENTRX
OD_ADD: +2.25
OS_AXIS: 076
OS_ADD: +2.25
OS_AXIS: 73
OD_OVR_VA: 20/
OS_SPHERE: +2.50
OS_SPHERE: +1.50
OS_VPRISM_DIRECTION: BF
OD_SPHERE: +1.75
OD_CYLINDER: -0.25
OS_OVR_VA: 20/
OD_OVR_VA: 20/
OD_VPRISM_DIRECTION: BF
OD_AXIS: 85
OD_VPRISM_DIRECTION: SV
OD_CYLINDER: -0.75
OS_CYLINDER: -1.25
OD_AXIS: 082
OS_OVR_VA: 20/
OD_SPHERE: -+2.50
OS_CYLINDER: -0.25
OS_VPRISM_DIRECTION: SV

## 2025-08-06 ASSESSMENT — TONOMETRY
OD_IOP_MMHG: 15
OS_IOP_MMHG: 18

## 2025-08-06 ASSESSMENT — VISUAL ACUITY
OS_BCVA: 20/20
OD_BCVA: 20/50-2

## 2025-08-06 ASSESSMENT — CONFRONTATIONAL VISUAL FIELD TEST (CVF)
OS_FINDINGS: FULL
OD_FINDINGS: FULL

## 2025-08-07 ENCOUNTER — NON-APPOINTMENT (OUTPATIENT)
Age: 60
End: 2025-08-07

## 2025-08-07 ENCOUNTER — APPOINTMENT (OUTPATIENT)
Dept: ELECTROPHYSIOLOGY | Facility: CLINIC | Age: 60
End: 2025-08-07
Payer: COMMERCIAL

## 2025-08-07 PROCEDURE — 93298 REM INTERROG DEV EVAL SCRMS: CPT

## 2025-09-11 ENCOUNTER — NON-APPOINTMENT (OUTPATIENT)
Age: 60
End: 2025-09-11

## 2025-09-11 ENCOUNTER — APPOINTMENT (OUTPATIENT)
Dept: ELECTROPHYSIOLOGY | Facility: CLINIC | Age: 60
End: 2025-09-11
Payer: COMMERCIAL

## 2025-09-11 PROCEDURE — 93298 REM INTERROG DEV EVAL SCRMS: CPT

## (undated) DEVICE — PACK NEURO

## (undated) DEVICE — POSITIONER FOAM EGG CRATE ULNAR 2PCS (PINK)

## (undated) DEVICE — SOL IRR POUR H2O 1000ML

## (undated) DEVICE — GOWN TRIMAX LG

## (undated) DEVICE — ELCTR BOVIE TIP BLADE INSULATED 4" EDGE

## (undated) DEVICE — ELCTR 4-DISC 20MM 49" (RED, BLUE, GREEN, BLACK)

## (undated) DEVICE — ELCTR BOVIE TIP NEEDLE INSULATED 2.8" EDGE

## (undated) DEVICE — SYR LUER LOK 10CC

## (undated) DEVICE — STAPLER SKIN PROXIMATE

## (undated) DEVICE — BIPOLAR FORCEP SYMMETRY BAYONET 7" X 1.5MM SMOOTH (SILVER)

## (undated) DEVICE — ELCTR SUBDERMAL NDL 27G X 1/2" WITH TWISTED PAIR

## (undated) DEVICE — TUBING SUCTION 20FT

## (undated) DEVICE — ELCTR SUBDERMAL NDL CLASSIC 1.5M X 59" (6 COLOR)

## (undated) DEVICE — WARMING BLANKET LOWER ADULT

## (undated) DEVICE — Device

## (undated) DEVICE — DRAPE INSTRUMENT POUCH 6.75" X 11"

## (undated) DEVICE — ELCTR SUBDERMAL CORKSCREW NDL 1.2MM

## (undated) DEVICE — STORZ DURA MICRO KNIFE INSERT SICKLE-SHAPED

## (undated) DEVICE — FRAZIER SUCTION TIP 10FR

## (undated) DEVICE — MEDICATION LABELS W MARKER

## (undated) DEVICE — ELCTR MONOPOLAR STIMULATOR PROBE FLUSH-TIP

## (undated) DEVICE — DRSG 4 X 8

## (undated) DEVICE — CLEANING SHEATH ENDO-SCRUB FOR STORZ 7210AA TELESCOPE 4MM 0 DEGREE

## (undated) DEVICE — SNAP ON SPHERZ 5 PACK

## (undated) DEVICE — SOL IRR POUR NS 0.9% 1000ML

## (undated) DEVICE — BLADE MEDTRONIC ENT TRICUT ROTATABLE STRAIGHT 4MM X 11CM

## (undated) DEVICE — DRAPE SPLIT SHEET 77" X 108"

## (undated) DEVICE — MARKING PEN W RULER

## (undated) DEVICE — STORZ DURA MICRO KNIFE INSERT POINTED

## (undated) DEVICE — ELCTR PEDICLE SCREW PROBE 3MM BALL 1.8MM X 100MM

## (undated) DEVICE — DRSG MEROCEL STANDARD NO STRING 8CM X 2CM

## (undated) DEVICE — BUR MEDTRONIC ENT TRANSNASAL SKULL BASE DIAMOND ROUND 15 DEGREE 4.5MM X 13CM

## (undated) DEVICE — DRAPE 1/2 SHEET 40X57"

## (undated) DEVICE — ELCTR BIPOLAR PROBE

## (undated) DEVICE — SPECIMEN CONTAINER 100ML

## (undated) DEVICE — DRAPE 3/4 SHEET W REINFORCEMENT 56X77"

## (undated) DEVICE — FOLEY TRAY 16FR 5CC LTX UMETER CLOSED

## (undated) DEVICE — SOL ANTI FOG

## (undated) DEVICE — DRSG TELFA 3 X 8

## (undated) DEVICE — APPLICATOR EXTENDED TIP 8CM

## (undated) DEVICE — VENODYNE/SCD SLEEVE CALF LARGE

## (undated) DEVICE — GLV 7 PROTEXIS (WHITE)

## (undated) DEVICE — ELCTR ROCKER SWITCH PENCIL BLUE 10FT